# Patient Record
Sex: FEMALE | Race: WHITE | NOT HISPANIC OR LATINO | Employment: OTHER | URBAN - METROPOLITAN AREA
[De-identification: names, ages, dates, MRNs, and addresses within clinical notes are randomized per-mention and may not be internally consistent; named-entity substitution may affect disease eponyms.]

---

## 2019-01-01 ENCOUNTER — APPOINTMENT (OUTPATIENT)
Dept: GENERAL RADIOLOGY | Facility: HOSPITAL | Age: 59
End: 2019-01-01

## 2019-01-01 ENCOUNTER — HOSPITAL ENCOUNTER (EMERGENCY)
Facility: HOSPITAL | Age: 59
Discharge: HOME | End: 2019-09-08
Attending: EMERGENCY MEDICINE | Admitting: EMERGENCY MEDICINE

## 2019-01-01 VITALS
HEART RATE: 88 BPM | DIASTOLIC BLOOD PRESSURE: 103 MMHG | SYSTOLIC BLOOD PRESSURE: 171 MMHG | RESPIRATION RATE: 17 BRPM | BODY MASS INDEX: 31.28 KG/M2 | HEIGHT: 62 IN | TEMPERATURE: 98.2 F | WEIGHT: 170 LBS | OXYGEN SATURATION: 99 %

## 2019-01-01 DIAGNOSIS — J44.1 COPD EXACERBATION (HCC): Primary | ICD-10-CM

## 2019-01-01 PROCEDURE — 99284 EMERGENCY DEPT VISIT MOD MDM: CPT

## 2019-01-01 PROCEDURE — 63710000001 PREDNISONE PER 1 MG: Performed by: EMERGENCY MEDICINE

## 2019-01-01 PROCEDURE — 93010 ELECTROCARDIOGRAM REPORT: CPT | Performed by: INTERNAL MEDICINE

## 2019-01-01 PROCEDURE — 93005 ELECTROCARDIOGRAM TRACING: CPT | Performed by: INTERNAL MEDICINE

## 2019-01-01 PROCEDURE — 71046 X-RAY EXAM CHEST 2 VIEWS: CPT

## 2019-01-01 PROCEDURE — 93005 ELECTROCARDIOGRAM TRACING: CPT | Performed by: EMERGENCY MEDICINE

## 2019-01-01 PROCEDURE — 94640 AIRWAY INHALATION TREATMENT: CPT

## 2019-01-01 PROCEDURE — 94799 UNLISTED PULMONARY SVC/PX: CPT

## 2019-01-01 RX ORDER — HYDROCODONE BITARTRATE AND ACETAMINOPHEN 7.5; 325 MG/1; MG/1
1 TABLET ORAL ONCE
Status: COMPLETED | OUTPATIENT
Start: 2019-01-01 | End: 2019-01-01

## 2019-01-01 RX ORDER — PREDNISONE 10 MG/1
50 TABLET ORAL DAILY
Qty: 20 TABLET | Refills: 0 | Status: SHIPPED | OUTPATIENT
Start: 2019-01-01 | End: 2019-01-01 | Stop reason: SDUPTHER

## 2019-01-01 RX ORDER — PREDNISONE 10 MG/1
50 TABLET ORAL DAILY
Qty: 20 TABLET | Refills: 0 | Status: SHIPPED | OUTPATIENT
Start: 2019-01-01 | End: 2019-01-01

## 2019-01-01 RX ORDER — DEXTROMETHORPHAN HYDROBROMIDE AND PROMETHAZINE HYDROCHLORIDE 15; 6.25 MG/5ML; MG/5ML
5 SYRUP ORAL 4 TIMES DAILY PRN
Qty: 118 ML | Refills: 0 | Status: SHIPPED | OUTPATIENT
Start: 2019-01-01 | End: 2019-01-01 | Stop reason: SDUPTHER

## 2019-01-01 RX ORDER — PREDNISONE 20 MG/1
60 TABLET ORAL ONCE
Status: COMPLETED | OUTPATIENT
Start: 2019-01-01 | End: 2019-01-01

## 2019-01-01 RX ORDER — DEXTROMETHORPHAN HYDROBROMIDE AND PROMETHAZINE HYDROCHLORIDE 15; 6.25 MG/5ML; MG/5ML
5 SYRUP ORAL 4 TIMES DAILY PRN
Qty: 118 ML | Refills: 0 | Status: SHIPPED | OUTPATIENT
Start: 2019-01-01 | End: 2020-01-01

## 2019-01-01 RX ORDER — IPRATROPIUM BROMIDE AND ALBUTEROL SULFATE 2.5; .5 MG/3ML; MG/3ML
3 SOLUTION RESPIRATORY (INHALATION) ONCE
Status: COMPLETED | OUTPATIENT
Start: 2019-01-01 | End: 2019-01-01

## 2019-01-01 RX ADMIN — PREDNISONE 60 MG: 20 TABLET ORAL at 14:38

## 2019-01-01 RX ADMIN — HYDROCODONE BITARTRATE AND ACETAMINOPHEN 1 TABLET: 7.5; 325 TABLET ORAL at 14:38

## 2019-01-01 RX ADMIN — IPRATROPIUM BROMIDE AND ALBUTEROL SULFATE 3 ML: 2.5; .5 SOLUTION RESPIRATORY (INHALATION) at 14:30

## 2019-07-10 ENCOUNTER — APPOINTMENT (OUTPATIENT)
Dept: GENERAL RADIOLOGY | Facility: HOSPITAL | Age: 59
End: 2019-07-10

## 2019-07-10 ENCOUNTER — HOSPITAL ENCOUNTER (EMERGENCY)
Facility: HOSPITAL | Age: 59
Discharge: HOME | End: 2019-07-10
Attending: EMERGENCY MEDICINE | Admitting: EMERGENCY MEDICINE

## 2019-07-10 VITALS
HEIGHT: 62 IN | DIASTOLIC BLOOD PRESSURE: 86 MMHG | BODY MASS INDEX: 30.18 KG/M2 | RESPIRATION RATE: 20 BRPM | OXYGEN SATURATION: 95 % | HEART RATE: 84 BPM | WEIGHT: 164 LBS | SYSTOLIC BLOOD PRESSURE: 136 MMHG | TEMPERATURE: 98 F

## 2019-07-10 DIAGNOSIS — R06.00 DYSPNEA, UNSPECIFIED TYPE: ICD-10-CM

## 2019-07-10 DIAGNOSIS — J44.1 COPD EXACERBATION (HCC): Primary | ICD-10-CM

## 2019-07-10 DIAGNOSIS — R07.81 PLEURITIC CHEST PAIN: ICD-10-CM

## 2019-07-10 LAB
ANION GAP SERPL CALCULATED.3IONS-SCNC: 16.9 MMOL/L (ref 5–15)
BASOPHILS # BLD AUTO: 0.1 10*3/MM3 (ref 0–0.2)
BASOPHILS NFR BLD AUTO: 0.3 % (ref 0–1.5)
BUN BLD-MCNC: 24 MG/DL (ref 8–20)
BUN/CREAT SERPL: 17.1 (ref 5.4–26.2)
CALCIUM SPEC-SCNC: 9.4 MG/DL (ref 8.9–10.3)
CHLORIDE SERPL-SCNC: 98 MMOL/L (ref 101–111)
CO2 SERPL-SCNC: 27 MMOL/L (ref 22–32)
CREAT BLD-MCNC: 1.4 MG/DL (ref 0.4–1)
DEPRECATED RDW RBC AUTO: 49.4 FL (ref 37–54)
EOSINOPHIL # BLD AUTO: 0 10*3/MM3 (ref 0–0.4)
EOSINOPHIL NFR BLD AUTO: 0 % (ref 0.3–6.2)
ERYTHROCYTE [DISTWIDTH] IN BLOOD BY AUTOMATED COUNT: 14.6 % (ref 12.3–15.4)
GFR SERPL CREATININE-BSD FRML MDRD: 39 ML/MIN/1.73
GLUCOSE BLD-MCNC: 117 MG/DL (ref 65–99)
HCT VFR BLD AUTO: 40.1 % (ref 34–46.6)
HGB BLD-MCNC: 13.4 G/DL (ref 12–15.9)
HOLD SPECIMEN: NORMAL
HOLD SPECIMEN: NORMAL
LYMPHOCYTES # BLD AUTO: 0.8 10*3/MM3 (ref 0.7–3.1)
LYMPHOCYTES NFR BLD AUTO: 4.8 % (ref 19.6–45.3)
MCH RBC QN AUTO: 32.3 PG (ref 26.6–33)
MCHC RBC AUTO-ENTMCNC: 33.3 G/DL (ref 31.5–35.7)
MCV RBC AUTO: 96.9 FL (ref 79–97)
MONOCYTES # BLD AUTO: 1.2 10*3/MM3 (ref 0.1–0.9)
MONOCYTES NFR BLD AUTO: 7.5 % (ref 5–12)
NEUTROPHILS # BLD AUTO: 14.4 10*3/MM3 (ref 1.7–7)
NEUTROPHILS NFR BLD AUTO: 87.4 % (ref 42.7–76)
NRBC BLD AUTO-RTO: 0 /100 WBC (ref 0–0.2)
PLATELET # BLD AUTO: 330 10*3/MM3 (ref 140–450)
PMV BLD AUTO: 7 FL (ref 6–12)
POTASSIUM BLD-SCNC: 3.9 MMOL/L (ref 3.6–5.1)
RBC # BLD AUTO: 4.14 10*6/MM3 (ref 3.77–5.28)
SODIUM BLD-SCNC: 138 MMOL/L (ref 136–144)
WBC NRBC COR # BLD: 16.5 10*3/MM3 (ref 3.4–10.8)
WHOLE BLOOD HOLD SPECIMEN: NORMAL
WHOLE BLOOD HOLD SPECIMEN: NORMAL

## 2019-07-10 PROCEDURE — 94640 AIRWAY INHALATION TREATMENT: CPT

## 2019-07-10 PROCEDURE — 94799 UNLISTED PULMONARY SVC/PX: CPT

## 2019-07-10 PROCEDURE — 85025 COMPLETE CBC W/AUTO DIFF WBC: CPT | Performed by: EMERGENCY MEDICINE

## 2019-07-10 PROCEDURE — 80048 BASIC METABOLIC PNL TOTAL CA: CPT | Performed by: EMERGENCY MEDICINE

## 2019-07-10 PROCEDURE — 25010000002 METHYLPREDNISOLONE PER 125 MG: Performed by: EMERGENCY MEDICINE

## 2019-07-10 PROCEDURE — 96374 THER/PROPH/DIAG INJ IV PUSH: CPT

## 2019-07-10 PROCEDURE — 93005 ELECTROCARDIOGRAM TRACING: CPT | Performed by: EMERGENCY MEDICINE

## 2019-07-10 PROCEDURE — 71045 X-RAY EXAM CHEST 1 VIEW: CPT

## 2019-07-10 PROCEDURE — 94760 N-INVAS EAR/PLS OXIMETRY 1: CPT

## 2019-07-10 PROCEDURE — 99284 EMERGENCY DEPT VISIT MOD MDM: CPT

## 2019-07-10 RX ORDER — ACETAMINOPHEN 500 MG
1000 TABLET ORAL ONCE
Status: COMPLETED | OUTPATIENT
Start: 2019-07-10 | End: 2019-07-10

## 2019-07-10 RX ORDER — SODIUM CHLORIDE 0.9 % (FLUSH) 0.9 %
10 SYRINGE (ML) INJECTION AS NEEDED
Status: DISCONTINUED | OUTPATIENT
Start: 2019-07-10 | End: 2019-07-10 | Stop reason: HOSPADM

## 2019-07-10 RX ORDER — ALBUTEROL SULFATE 2.5 MG/3ML
2.5 SOLUTION RESPIRATORY (INHALATION)
Status: COMPLETED | OUTPATIENT
Start: 2019-07-10 | End: 2019-07-10

## 2019-07-10 RX ORDER — METHYLPREDNISOLONE SODIUM SUCCINATE 125 MG/2ML
125 INJECTION, POWDER, LYOPHILIZED, FOR SOLUTION INTRAMUSCULAR; INTRAVENOUS ONCE
Status: COMPLETED | OUTPATIENT
Start: 2019-07-10 | End: 2019-07-10

## 2019-07-10 RX ADMIN — ACETAMINOPHEN 1000 MG: 500 TABLET, FILM COATED ORAL at 05:29

## 2019-07-10 RX ADMIN — METHYLPREDNISOLONE SODIUM SUCCINATE 125 MG: 125 INJECTION, POWDER, FOR SOLUTION INTRAMUSCULAR; INTRAVENOUS at 02:42

## 2019-07-10 RX ADMIN — ALBUTEROL SULFATE 2.5 MG: 2.5 SOLUTION RESPIRATORY (INHALATION) at 03:15

## 2019-07-10 RX ADMIN — ALBUTEROL SULFATE 2.5 MG: 2.5 SOLUTION RESPIRATORY (INHALATION) at 03:05

## 2019-07-10 RX ADMIN — SODIUM CHLORIDE 500 ML: 0.9 INJECTION, SOLUTION INTRAVENOUS at 02:42

## 2019-07-10 NOTE — ED NOTES
"Patient very upset at this time. Shouting \"I can't believe that mother castro didn't help me with my pain! He is worthless and wants to just give me Tylenol.\" She became more agitated when it was explained that she came to the ED because of taking too much medication. She screamed \"It was 15 hours ago! This is why people self medicate! Can't you see all these bruises? I'm hurting!\" She began shaking the bed rails and flopping her legs on the bed. She continued to become more aggravated. Charge nurse made aware.      Marta Walker RN  07/10/19 0542    "

## 2019-07-10 NOTE — ED NOTES
Patient states she would rather sleep under a bridge than to go home.      Marta Walker, BILL  07/10/19 0573

## 2019-07-10 NOTE — ED PROVIDER NOTES
"Subjective   History of Present Illness  Left chest wall pain  58-year-old female complains of a productive cough over the last 3 days associated with a moderate left lateral sharp chest pain with cough.  She describes it as pleurisy.  She states she has felt mildly short of breath.  She reports no fevers or chills or abdominal pain or nausea or vomiting.  She reports no radiating pain or diaphoresis.  Review of Systems   Constitutional: Negative.    HENT: Negative.    Eyes: Negative.    Respiratory: Positive for cough and shortness of breath.    Cardiovascular: Positive for chest pain.   Gastrointestinal: Negative.    Endocrine: Negative.    Genitourinary: Negative.    Musculoskeletal: Negative.    Skin: Negative.    Allergic/Immunologic: Negative.    Neurological: Negative.    Hematological: Negative.    Psychiatric/Behavioral: Negative.        No past medical history on file.    No Known Allergies    No past surgical history on file.    No family history on file.    Social History     Socioeconomic History   • Marital status:      Spouse name: Not on file   • Number of children: Not on file   • Years of education: Not on file   • Highest education level: Not on file       /86   Pulse 84   Temp 98 °F (36.7 °C) (Oral)   Resp 20   Ht 157.5 cm (62\")   Wt 74.4 kg (164 lb)   SpO2 95%   BMI 30.00 kg/m²       Objective   Physical Exam  General: Well-developed well-appearing, no acute distress, alert and appropriate  Eyes: Pupils round and reactive, sclera nonicteric  HEENT: Mucous membranes moist, no mucosal swelling  Neck: Supple, no nuchal rigidity, no lymphadenopathy  Respirations: Respirations nonlabored, equal breath sounds bilaterally, some coarse wheezing bilaterally, chest wall tender palpation along the left lateral chest wall which does reproduce the presenting pain, there is no rash, there is no subcu air or crepitus  Heart regular rate and rhythm, no murmurs rubs or gallops,   Abdomen " soft nontender nondistended, no hepatosplenomegaly, no hernia, no mass, normal bowel sounds, no CVA tenderness  Extremities no clubbing cyanosis or edema, calves are symmetric and nontender  Neuro cranial nerves grossly intact, no focal limb deficits  Psych oriented, tearful   skin no rash, brisk cap refill  Procedures           ED Course      Results for orders placed or performed during the hospital encounter of 07/10/19   Basic Metabolic Panel   Result Value Ref Range    Glucose 117 (H) 65 - 99 mg/dL    BUN 24 (H) 8 - 20 mg/dL    Creatinine 1.40 (H) 0.40 - 1.00 mg/dL    Sodium 138 136 - 144 mmol/L    Potassium 3.9 3.6 - 5.1 mmol/L    Chloride 98 (L) 101 - 111 mmol/L    CO2 27.0 22.0 - 32.0 mmol/L    Calcium 9.4 8.9 - 10.3 mg/dL    eGFR Non African Amer 39 (L) >60 mL/min/1.73    BUN/Creatinine Ratio 17.1 5.4 - 26.2    Anion Gap 16.9 (H) 5.0 - 15.0 mmol/L   CBC Auto Differential   Result Value Ref Range    WBC 16.50 (H) 3.40 - 10.80 10*3/mm3    RBC 4.14 3.77 - 5.28 10*6/mm3    Hemoglobin 13.4 12.0 - 15.9 g/dL    Hematocrit 40.1 34.0 - 46.6 %    MCV 96.9 79.0 - 97.0 fL    MCH 32.3 26.6 - 33.0 pg    MCHC 33.3 31.5 - 35.7 g/dL    RDW 14.6 12.3 - 15.4 %    RDW-SD 49.4 37.0 - 54.0 fl    MPV 7.0 6.0 - 12.0 fL    Platelets 330 140 - 450 10*3/mm3    Neutrophil % 87.4 (H) 42.7 - 76.0 %    Lymphocyte % 4.8 (L) 19.6 - 45.3 %    Monocyte % 7.5 5.0 - 12.0 %    Eosinophil % 0.0 (L) 0.3 - 6.2 %    Basophil % 0.3 0.0 - 1.5 %    Neutrophils, Absolute 14.40 (H) 1.70 - 7.00 10*3/mm3    Lymphocytes, Absolute 0.80 0.70 - 3.10 10*3/mm3    Monocytes, Absolute 1.20 (H) 0.10 - 0.90 10*3/mm3    Eosinophils, Absolute 0.00 0.00 - 0.40 10*3/mm3    Basophils, Absolute 0.10 0.00 - 0.20 10*3/mm3    nRBC 0.0 0.0 - 0.2 /100 WBC   Light Blue Top   Result Value Ref Range    Extra Tube hold for add-on    Green Top (Gel)   Result Value Ref Range    Extra Tube Hold for add-ons.    Lavender Top   Result Value Ref Range    Extra Tube hold for add-on     Gold Top - SST   Result Value Ref Range    Extra Tube Hold for add-ons.      Chest x-ray basilar atelectasis on the right, no acute pulmonary process, pending radiology report          MDM    Patient reportedly has been taking prednisone and was advised to continue.  Her oxygen saturation on reexamination are 97% on room air she is in no respiratory distress she has some mild expiratory wheeze.  Chest x-ray shows some mild atelectasis no acute infiltrate pending radiology report.  She is discharged in good condition she was prescribed doxycycline and was given warning signs for return.  Notably her pain is reproducible with palpation and thus not felt to be likely related to cardiac ischemia or pulmonary embolus and is more consistent with musculoskeletal origin  Final diagnoses:   COPD exacerbation (CMS/[facility])   Dyspnea, unspecified type   Pleuritic chest pain            Gaurang Patrick MD  07/10/19 7449       Gaurang Patrick MD  07/10/19 2951       Gaurang Patrick MD  07/10/19 1872
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
## 2019-07-10 NOTE — ED NOTES
[facility] leaving facility. Contact number [phone] Jill Varela at [phone].      Marta Walker RN  07/10/19 1506
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
## 2019-07-19 ENCOUNTER — APPOINTMENT (OUTPATIENT)
Dept: GENERAL RADIOLOGY | Facility: HOSPITAL | Age: 59
End: 2019-07-19

## 2019-07-19 ENCOUNTER — HOSPITAL ENCOUNTER (EMERGENCY)
Facility: HOSPITAL | Age: 59
Discharge: HOME | End: 2019-07-19
Admitting: EMERGENCY MEDICINE

## 2019-07-19 VITALS
DIASTOLIC BLOOD PRESSURE: 92 MMHG | RESPIRATION RATE: 15 BRPM | SYSTOLIC BLOOD PRESSURE: 176 MMHG | OXYGEN SATURATION: 100 % | TEMPERATURE: 97.8 F | WEIGHT: 161.6 LBS | HEART RATE: 91 BPM | BODY MASS INDEX: 29.74 KG/M2 | HEIGHT: 62 IN

## 2019-07-19 DIAGNOSIS — R05.9 COUGH: ICD-10-CM

## 2019-07-19 DIAGNOSIS — M79.601 RIGHT ARM PAIN: Primary | ICD-10-CM

## 2019-07-19 PROCEDURE — 99283 EMERGENCY DEPT VISIT LOW MDM: CPT

## 2019-07-19 PROCEDURE — 71101 X-RAY EXAM UNILAT RIBS/CHEST: CPT

## 2019-07-19 PROCEDURE — 94640 AIRWAY INHALATION TREATMENT: CPT

## 2019-07-19 PROCEDURE — 25010000002 KETOROLAC TROMETHAMINE PER 15 MG: Performed by: NURSE PRACTITIONER

## 2019-07-19 PROCEDURE — 96372 THER/PROPH/DIAG INJ SC/IM: CPT

## 2019-07-19 RX ORDER — LORATADINE 10 MG/1
10 TABLET ORAL DAILY
COMMUNITY
End: 2020-01-01 | Stop reason: HOSPADM

## 2019-07-19 RX ORDER — IPRATROPIUM BROMIDE AND ALBUTEROL SULFATE 2.5; .5 MG/3ML; MG/3ML
3 SOLUTION RESPIRATORY (INHALATION) EVERY 4 HOURS PRN
Qty: 90 ML | Refills: 0 | Status: SHIPPED | OUTPATIENT
Start: 2019-07-19 | End: 2020-01-01

## 2019-07-19 RX ORDER — PREDNISONE 10 MG/1
10 TABLET ORAL DAILY
Qty: 5 TABLET | Refills: 0 | OUTPATIENT
Start: 2019-07-19 | End: 2019-01-01

## 2019-07-19 RX ORDER — PROMETHAZINE HYDROCHLORIDE 25 MG/1
25 TABLET ORAL EVERY 8 HOURS PRN
COMMUNITY
End: 2020-01-01

## 2019-07-19 RX ORDER — RANITIDINE 150 MG/1
150 TABLET ORAL 2 TIMES DAILY
COMMUNITY
End: 2020-01-01

## 2019-07-19 RX ORDER — IPRATROPIUM BROMIDE AND ALBUTEROL SULFATE 2.5; .5 MG/3ML; MG/3ML
3 SOLUTION RESPIRATORY (INHALATION) ONCE
Status: COMPLETED | OUTPATIENT
Start: 2019-07-19 | End: 2019-07-19

## 2019-07-19 RX ORDER — ROFLUMILAST 500 UG/1
125 TABLET ORAL DAILY
COMMUNITY
End: 2020-01-01

## 2019-07-19 RX ORDER — DOXYCYCLINE HYCLATE 100 MG/1
100 CAPSULE ORAL 2 TIMES DAILY
COMMUNITY
Start: 2019-07-12 | End: 2020-01-01

## 2019-07-19 RX ORDER — MONTELUKAST SODIUM 10 MG/1
10 TABLET ORAL NIGHTLY
COMMUNITY
End: 2020-01-01

## 2019-07-19 RX ORDER — KETOROLAC TROMETHAMINE 30 MG/ML
30 INJECTION, SOLUTION INTRAMUSCULAR; INTRAVENOUS ONCE
Status: COMPLETED | OUTPATIENT
Start: 2019-07-19 | End: 2019-07-19

## 2019-07-19 RX ORDER — AMLODIPINE BESYLATE 5 MG/1
5 TABLET ORAL DAILY
COMMUNITY
End: 2020-01-01 | Stop reason: HOSPADM

## 2019-07-19 RX ORDER — PANTOPRAZOLE SODIUM 40 MG/1
40 TABLET, DELAYED RELEASE ORAL DAILY PRN
COMMUNITY
End: 2020-01-01

## 2019-07-19 RX ORDER — ALBUTEROL SULFATE 90 UG/1
2 AEROSOL, METERED RESPIRATORY (INHALATION) EVERY 4 HOURS PRN
Qty: 6.7 G | Refills: 0 | Status: SHIPPED | OUTPATIENT
Start: 2019-07-19 | End: 2020-01-01

## 2019-07-19 RX ORDER — ALBUTEROL SULFATE 90 UG/1
2 AEROSOL, METERED RESPIRATORY (INHALATION) EVERY 6 HOURS PRN
COMMUNITY
End: 2020-01-01

## 2019-07-19 RX ORDER — FLUTICASONE PROPIONATE 50 MCG
1 SPRAY, SUSPENSION (ML) NASAL 2 TIMES DAILY
COMMUNITY
End: 2020-01-01

## 2019-07-19 RX ORDER — BUPRENORPHINE HYDROCHLORIDE AND NALOXONE HYDROCHLORIDE DIHYDRATE 8; 2 MG/1; MG/1
2 TABLET SUBLINGUAL DAILY
COMMUNITY
End: 2020-01-01

## 2019-07-19 RX ADMIN — IPRATROPIUM BROMIDE AND ALBUTEROL SULFATE 3 ML: .5; 3 SOLUTION RESPIRATORY (INHALATION) at 10:27

## 2019-07-19 RX ADMIN — KETOROLAC TROMETHAMINE 30 MG: 30 INJECTION, SOLUTION INTRAMUSCULAR at 11:47

## 2019-09-08 NOTE — ED PROVIDER NOTES
" EMERGENCY DEPARTMENT ENCOUNTER    Room Number:  09/09  Date seen:  9/8/2019  Time seen: 1:26 PM  PCP: Provider, No Known  Historian: Patient      HPI:  Chief Complaint: Cough, COPD flare  A complete HPI/ROS/PMH/PSH/SH/FH are unobtainable due to: Nothing  Context: Daphne Mccurdy is a 58 y.o. female who presents to the ED c/o cough, shortness of breath for the last 4 days.  She reports that she went to urgent care yesterday and was prescribed doxycycline.  She reports she has been taking low-dose steroids for the last 2 years.  She apparently has a pulmonologist whom she is supposed to be getting established with later this month.  She also reports pain over her ribs on the left when coughing.  She has had no fever or chills.  She has had increased sputum production.  She reports a history of \"stage IV COPD\".            PAST MEDICAL HISTORY  Active Ambulatory Problems     Diagnosis Date Noted   • No Active Ambulatory Problems     Resolved Ambulatory Problems     Diagnosis Date Noted   • No Resolved Ambulatory Problems     Past Medical History:   Diagnosis Date   • Asthma    • Cellulitis    • COPD (chronic obstructive pulmonary disease) (CMS/HCC)    • GERD (gastroesophageal reflux disease)    • Hepatitis C    • Hyperlipidemia    • Hypertension    • Pneumonia          PAST SURGICAL HISTORY  Past Surgical History:   Procedure Laterality Date   • MANDIBLE SURGERY     • TUBAL ABDOMINAL LIGATION           FAMILY HISTORY  History reviewed. No pertinent family history.      SOCIAL HISTORY  Social History     Socioeconomic History   • Marital status:      Spouse name: Not on file   • Number of children: Not on file   • Years of education: Not on file   • Highest education level: Not on file   Tobacco Use   • Smoking status: Current Every Day Smoker   Substance and Sexual Activity   • Alcohol use: No     Frequency: Never   • Drug use: Yes     Types: Marijuana         ALLERGIES  Patient has no known " allergies.        REVIEW OF SYSTEMS  Review of Systems   Constitutional: Negative for diaphoresis and fever.   HENT: Negative for congestion.    Eyes: Negative for visual disturbance.   Respiratory: Positive for cough and shortness of breath.    Cardiovascular: Negative for palpitations.   Gastrointestinal: Negative for blood in stool and vomiting.   Endocrine: Negative for polyuria.   Genitourinary: Negative for flank pain.   Musculoskeletal: Negative for joint swelling.   Skin: Negative for wound.   Neurological: Negative for seizures.   Hematological: Negative for adenopathy.   Psychiatric/Behavioral: Negative for sleep disturbance.            PHYSICAL EXAM  ED Triage Vitals   Temp Heart Rate Resp BP SpO2   09/08/19 1312 09/08/19 1311 09/08/19 1311 09/08/19 1311 09/08/19 1311   98.2 °F (36.8 °C) 103 18 170/100 98 %      Temp src Heart Rate Source Patient Position BP Location FiO2 (%)   09/08/19 1312 09/08/19 1311 -- -- --   Tympanic Monitor            GENERAL: Awake and alert, no acute distress  HENT: nares patent  EYES: no scleral icterus  CV: regular rhythm, normal rate  RESPIRATORY: normal effort, mild diffuse expiratory wheezing.  Point tenderness of the left lateral chest wall in the midaxillary line.  ABDOMEN: soft, nontender throughout  MUSCULOSKELETAL: no deformity, no lower extremity edema  NEURO: alert, moves all extremities, follows commands  SKIN: warm, dry    Vital signs and nursing notes reviewed.          58-year-old female presents with cough for the last 4 days.  She is afebrile here.      RADIOLOGY  Xr Chest 2 View    Result Date: 9/8/2019  Chest radiograph  HISTORY:Cough  TECHNIQUE: Two PA and lateral radiographs  COMPARISON:None  FINDINGS: Pulmonary opacification within the left base is present and a primarily linear distribution. There is no pulmonary consolidation.. There is no pleural effusion. No pneumothorax is seen. The heart is at the upper limits of normal in size. Compression  deformities within the mid to upper thoracic spine are incompletely evaluated.      1.  Primarily linear pulmonary opacification within the left base favored to represent atelectasis. Early pneumonia could appear similar in the appropriate clinical context. However, this felt to be less likely. Correlation with patient history is recommended. 2.  Compression deformities within the mid to upper thoracic spine are incompletely evaluated and of indeterminate age. Findings can be further evaluated with MRI if clinically indicated.  This report was finalized on 9/8/2019 2:11 PM by Dr. Tapan Dumont M.D.        Ordered the above noted radiological studies. Reviewed by me in PACS.            PROCEDURES  Procedures        EKG:           EKG time: 1416  Rhythm/Rate: Sinus rhythm, 87    P waves and NJ: normal  QRS, axis: Normal  ST and T waves: No acute ischemic changes    Interpreted Contemporaneously by me, independently viewed  Similar compared to prior 7/10/2019              MEDICATIONS GIVEN IN ER  Medications   predniSONE (DELTASONE) tablet 60 mg (60 mg Oral Given 9/8/19 1438)   ipratropium-albuterol (DUO-NEB) nebulizer solution 3 mL (3 mL Nebulization Given 9/8/19 1430)   HYDROcodone-acetaminophen (NORCO) 7.5-325 MG per tablet 1 tablet (1 tablet Oral Given 9/8/19 1438)                   PROGRESS AND CONSULTS       1500:  Patient reassessed, improved lung sounds after neb treatment.  Steroids and cough syrup sent to her pharmacy.  Return precautions discussed.     MEDICAL DECISION MAKING    Chest x-ray with left lung base linear atelectasis versus developing infiltrate.  She is already taking doxycycline which was prescribed to her yesterday.  I recommended treating her with increased dose of prednisone, and continue doxycycline prescribed.  She was given a DuoNeb treatment here.  EKG obtained and reassuring.  Return precautions were discussed.  Follow-up with pulmonary.    MDM           DIAGNOSIS  Final diagnoses:    COPD exacerbation (CMS/[facility])         DISPOSITION  Discharge            Latest Documented Vital Signs:  As of 3:08 PM  BP- (!) 171/103 HR- 92 Temp- 98.2 °F (36.8 °C) (Tympanic) O2 sat- 100%        --  Documentation assistance provided by arely Lobo for Dr. TIO Guardado MD.  Information recorded by the scribe was done at my direction and has been verified and validated by me.      Please note that portions of this were completed with a voice recognition program.      Nichole Lobo  09/08/19 6851       Manuelito Guardado MD  09/08/19 2225

## 2020-01-01 ENCOUNTER — APPOINTMENT (OUTPATIENT)
Dept: GENERAL RADIOLOGY | Facility: HOSPITAL | Age: 60
End: 2020-01-01

## 2020-01-01 ENCOUNTER — HOSPITAL ENCOUNTER (INPATIENT)
Facility: HOSPITAL | Age: 60
LOS: 2 days | Discharge: HOSPICE | End: 2020-07-17
Attending: FAMILY MEDICINE | Admitting: FAMILY MEDICINE

## 2020-01-01 ENCOUNTER — HOSPITAL ENCOUNTER (INPATIENT)
Facility: HOSPITAL | Age: 60
LOS: 6 days | End: 2020-09-03
Attending: INTERNAL MEDICINE | Admitting: INTERNAL MEDICINE

## 2020-01-01 ENCOUNTER — HOSPITAL ENCOUNTER (INPATIENT)
Facility: HOSPITAL | Age: 60
LOS: 4 days | Discharge: HOSPICE | End: 2020-08-04
Attending: EMERGENCY MEDICINE | Admitting: HOSPITALIST

## 2020-01-01 ENCOUNTER — READMISSION MANAGEMENT (OUTPATIENT)
Dept: CALL CENTER | Facility: HOSPITAL | Age: 60
End: 2020-01-01

## 2020-01-01 ENCOUNTER — HOSPITAL ENCOUNTER (INPATIENT)
Facility: HOSPITAL | Age: 60
LOS: 5 days | Discharge: HOSPICE | End: 2020-08-28
Attending: EMERGENCY MEDICINE | Admitting: INTERNAL MEDICINE

## 2020-01-01 ENCOUNTER — APPOINTMENT (OUTPATIENT)
Dept: CT IMAGING | Facility: HOSPITAL | Age: 60
End: 2020-01-01

## 2020-01-01 ENCOUNTER — APPOINTMENT (OUTPATIENT)
Dept: CARDIOLOGY | Facility: HOSPITAL | Age: 60
End: 2020-01-01

## 2020-01-01 VITALS
HEIGHT: 62 IN | TEMPERATURE: 101 F | SYSTOLIC BLOOD PRESSURE: 88 MMHG | BODY MASS INDEX: 19.85 KG/M2 | OXYGEN SATURATION: 45 % | DIASTOLIC BLOOD PRESSURE: 47 MMHG | WEIGHT: 107.9 LBS

## 2020-01-01 VITALS
HEART RATE: 118 BPM | WEIGHT: 96.78 LBS | BODY MASS INDEX: 19 KG/M2 | SYSTOLIC BLOOD PRESSURE: 114 MMHG | HEIGHT: 60 IN | OXYGEN SATURATION: 94 % | TEMPERATURE: 98.9 F | DIASTOLIC BLOOD PRESSURE: 79 MMHG | RESPIRATION RATE: 22 BRPM

## 2020-01-01 VITALS
WEIGHT: 117.5 LBS | RESPIRATION RATE: 20 BRPM | HEART RATE: 92 BPM | BODY MASS INDEX: 21.62 KG/M2 | TEMPERATURE: 97.9 F | DIASTOLIC BLOOD PRESSURE: 89 MMHG | SYSTOLIC BLOOD PRESSURE: 146 MMHG | OXYGEN SATURATION: 96 % | HEIGHT: 62 IN

## 2020-01-01 VITALS
HEIGHT: 62 IN | TEMPERATURE: 98 F | WEIGHT: 117.73 LBS | RESPIRATION RATE: 18 BRPM | DIASTOLIC BLOOD PRESSURE: 87 MMHG | HEART RATE: 110 BPM | SYSTOLIC BLOOD PRESSURE: 120 MMHG | OXYGEN SATURATION: 97 % | BODY MASS INDEX: 21.66 KG/M2

## 2020-01-01 DIAGNOSIS — R07.81 PLEURITIC CHEST PAIN: ICD-10-CM

## 2020-01-01 DIAGNOSIS — R65.20 SEVERE SEPSIS (HCC): Primary | ICD-10-CM

## 2020-01-01 DIAGNOSIS — E87.6 HYPOKALEMIA: ICD-10-CM

## 2020-01-01 DIAGNOSIS — R07.9 CHEST PAIN, UNSPECIFIED TYPE: ICD-10-CM

## 2020-01-01 DIAGNOSIS — J18.9 PNEUMONIA OF RIGHT LOWER LOBE DUE TO INFECTIOUS ORGANISM: ICD-10-CM

## 2020-01-01 DIAGNOSIS — J44.1 ACUTE EXACERBATION OF CHRONIC OBSTRUCTIVE PULMONARY DISEASE (COPD) (HCC): Primary | ICD-10-CM

## 2020-01-01 DIAGNOSIS — F06.4 ANXIETY DISORDER DUE TO KNOWN PHYSIOLOGICAL CONDITION: Chronic | ICD-10-CM

## 2020-01-01 DIAGNOSIS — R06.03 ACUTE RESPIRATORY DISTRESS: Primary | ICD-10-CM

## 2020-01-01 DIAGNOSIS — J43.9 PULMONARY EMPHYSEMA, UNSPECIFIED EMPHYSEMA TYPE (HCC): ICD-10-CM

## 2020-01-01 DIAGNOSIS — J20.9 ACUTE BRONCHITIS, UNSPECIFIED ORGANISM: ICD-10-CM

## 2020-01-01 DIAGNOSIS — J44.1 CHRONIC OBSTRUCTIVE PULMONARY DISEASE WITH ACUTE EXACERBATION (HCC): ICD-10-CM

## 2020-01-01 DIAGNOSIS — J44.1 COPD EXACERBATION (HCC): ICD-10-CM

## 2020-01-01 DIAGNOSIS — A41.9 SEVERE SEPSIS (HCC): Primary | ICD-10-CM

## 2020-01-01 DIAGNOSIS — R77.8 ELEVATED TROPONIN I LEVEL: ICD-10-CM

## 2020-01-01 LAB
ABO GROUP BLD: NORMAL
ALBUMIN SERPL-MCNC: 4 G/DL (ref 3.5–5.2)
ALBUMIN SERPL-MCNC: 4.1 G/DL (ref 3.5–5.2)
ALBUMIN SERPL-MCNC: 4.1 G/DL (ref 3.5–5.2)
ALBUMIN SERPL-MCNC: 4.3 G/DL (ref 3.5–5.2)
ALBUMIN/GLOB SERPL: 1.5 G/DL
ALBUMIN/GLOB SERPL: 1.7 G/DL
ALBUMIN/GLOB SERPL: 1.7 G/DL
ALBUMIN/GLOB SERPL: 1.9 G/DL
ALP SERPL-CCNC: 102 U/L (ref 39–117)
ALP SERPL-CCNC: 103 U/L (ref 39–117)
ALP SERPL-CCNC: 140 U/L (ref 39–117)
ALP SERPL-CCNC: 209 U/L (ref 39–117)
ALT SERPL W P-5'-P-CCNC: 27 U/L (ref 1–33)
ALT SERPL W P-5'-P-CCNC: 32 U/L (ref 1–33)
ALT SERPL W P-5'-P-CCNC: 32 U/L (ref 1–33)
ALT SERPL W P-5'-P-CCNC: 36 U/L (ref 1–33)
ANION GAP SERPL CALCULATED.3IONS-SCNC: 10 MMOL/L (ref 5–15)
ANION GAP SERPL CALCULATED.3IONS-SCNC: 11 MMOL/L (ref 5–15)
ANION GAP SERPL CALCULATED.3IONS-SCNC: 12 MMOL/L (ref 5–15)
ANION GAP SERPL CALCULATED.3IONS-SCNC: 14 MMOL/L (ref 5–15)
ANION GAP SERPL CALCULATED.3IONS-SCNC: 14 MMOL/L (ref 5–15)
ANION GAP SERPL CALCULATED.3IONS-SCNC: 16 MMOL/L (ref 5–15)
ANION GAP SERPL CALCULATED.3IONS-SCNC: 17 MMOL/L (ref 5–15)
ANION GAP SERPL CALCULATED.3IONS-SCNC: 17 MMOL/L (ref 5–15)
ANION GAP SERPL CALCULATED.3IONS-SCNC: 18 MMOL/L (ref 5–15)
ANISOCYTOSIS BLD QL: ABNORMAL
APTT PPP: 18.8 SECONDS (ref 24–31)
ARTERIAL PATENCY WRIST A: ABNORMAL
ARTERIAL PATENCY WRIST A: POSITIVE
AST SERPL-CCNC: 20 U/L (ref 1–32)
AST SERPL-CCNC: 23 U/L (ref 1–32)
AST SERPL-CCNC: 23 U/L (ref 1–32)
AST SERPL-CCNC: 27 U/L (ref 1–32)
ATMOSPHERIC PRESS: ABNORMAL MM[HG]
ATMOSPHERIC PRESS: ABNORMAL MM[HG]
B PARAPERT DNA SPEC QL NAA+PROBE: NOT DETECTED
B PARAPERT DNA SPEC QL NAA+PROBE: NOT DETECTED
B PERT DNA SPEC QL NAA+PROBE: NOT DETECTED
BACTERIA SPEC AEROBE CULT: ABNORMAL
BACTERIA SPEC AEROBE CULT: NORMAL
BASE EXCESS BLDA CALC-SCNC: 12 MMOL/L (ref 0–3)
BASE EXCESS BLDA CALC-SCNC: 20.1 MMOL/L (ref 0–3)
BASO STIPL COARSE BLD QL SMEAR: ABNORMAL
BASOPHILS # BLD AUTO: 0 10*3/MM3 (ref 0–0.2)
BASOPHILS # BLD AUTO: 0 10*3/MM3 (ref 0–0.2)
BASOPHILS # BLD AUTO: 0.1 10*3/MM3 (ref 0–0.2)
BASOPHILS NFR BLD AUTO: 0 % (ref 0–1.5)
BASOPHILS NFR BLD AUTO: 0.2 % (ref 0–1.5)
BASOPHILS NFR BLD AUTO: 0.7 % (ref 0–1.5)
BDY SITE: ABNORMAL
BDY SITE: ABNORMAL
BH BB BLOOD EXPIRATION DATE: NORMAL
BH BB BLOOD TYPE BARCODE: 5100
BH BB DISPENSE STATUS: NORMAL
BH BB PRODUCT CODE: NORMAL
BH BB UNIT NUMBER: NORMAL
BH CV ECHO MEAS - % IVS THICK: 38.6 %
BH CV ECHO MEAS - % LVPW THICK: 18 %
BH CV ECHO MEAS - ACS: 1.9 CM
BH CV ECHO MEAS - AO MAX PG (FULL): 1 MMHG
BH CV ECHO MEAS - AO MAX PG: 5.1 MMHG
BH CV ECHO MEAS - AO MEAN PG (FULL): 0.57 MMHG
BH CV ECHO MEAS - AO MEAN PG: 2.6 MMHG
BH CV ECHO MEAS - AO ROOT AREA (BSA CORRECTED): 1.9
BH CV ECHO MEAS - AO ROOT AREA: 6.4 CM^2
BH CV ECHO MEAS - AO ROOT DIAM: 2.9 CM
BH CV ECHO MEAS - AO V2 MAX: 113.2 CM/SEC
BH CV ECHO MEAS - AO V2 MEAN: 76.3 CM/SEC
BH CV ECHO MEAS - AO V2 VTI: 12 CM
BH CV ECHO MEAS - AORTIC HR: 396.6 BPM
BH CV ECHO MEAS - AORTIC R-R: 0.15 SEC
BH CV ECHO MEAS - AVA(I,A): 2.4 CM^2
BH CV ECHO MEAS - AVA(I,D): 2.4 CM^2
BH CV ECHO MEAS - AVA(V,A): 2.2 CM^2
BH CV ECHO MEAS - AVA(V,D): 2.2 CM^2
BH CV ECHO MEAS - BSA(HAYCOCK): 1.5 M^2
BH CV ECHO MEAS - BSA: 1.5 M^2
BH CV ECHO MEAS - BZI_BMI: 20.3 KILOGRAMS/M^2
BH CV ECHO MEAS - BZI_METRIC_HEIGHT: 157.5 CM
BH CV ECHO MEAS - BZI_METRIC_WEIGHT: 50.3 KG
BH CV ECHO MEAS - CI(AO): 20.4 L/MIN/M^2
BH CV ECHO MEAS - CI(LVOT): 7.6 L/MIN/M^2
BH CV ECHO MEAS - CO(AO): 30.4 L/MIN
BH CV ECHO MEAS - CO(LVOT): 11.3 L/MIN
BH CV ECHO MEAS - EDV(CUBED): 45.2 ML
BH CV ECHO MEAS - EDV(MOD-SP4): 45.8 ML
BH CV ECHO MEAS - EDV(TEICH): 53.1 ML
BH CV ECHO MEAS - EF(CUBED): 84.5 %
BH CV ECHO MEAS - EF(MOD-BP): 60 %
BH CV ECHO MEAS - EF(MOD-SP4): 59.8 %
BH CV ECHO MEAS - EF(TEICH): 78.6 %
BH CV ECHO MEAS - ESV(CUBED): 7 ML
BH CV ECHO MEAS - ESV(MOD-SP4): 18.4 ML
BH CV ECHO MEAS - ESV(TEICH): 11.4 ML
BH CV ECHO MEAS - FS: 46.3 %
BH CV ECHO MEAS - IVS/LVPW: 0.98
BH CV ECHO MEAS - IVSD: 1.1 CM
BH CV ECHO MEAS - IVSS: 1.5 CM
BH CV ECHO MEAS - LA DIMENSION(2D): 2.8 CM
BH CV ECHO MEAS - LV DIASTOLIC VOL/BSA (35-75): 30.7 ML/M^2
BH CV ECHO MEAS - LV MASS(C)D: 117.8 GRAMS
BH CV ECHO MEAS - LV MASS(C)DI: 79.2 GRAMS/M^2
BH CV ECHO MEAS - LV MASS(C)S: 79.2 GRAMS
BH CV ECHO MEAS - LV MASS(C)SI: 53.2 GRAMS/M^2
BH CV ECHO MEAS - LV MAX PG: 4.1 MMHG
BH CV ECHO MEAS - LV MEAN PG: 2 MMHG
BH CV ECHO MEAS - LV SYSTOLIC VOL/BSA (12-30): 12.3 ML/M^2
BH CV ECHO MEAS - LV V1 MAX: 101.2 CM/SEC
BH CV ECHO MEAS - LV V1 MEAN: 64.8 CM/SEC
BH CV ECHO MEAS - LV V1 VTI: 11.6 CM
BH CV ECHO MEAS - LVIDD: 3.6 CM
BH CV ECHO MEAS - LVIDS: 1.9 CM
BH CV ECHO MEAS - LVOT AREA: 2.4 CM^2
BH CV ECHO MEAS - LVOT DIAM: 1.8 CM
BH CV ECHO MEAS - LVPWD: 1.1 CM
BH CV ECHO MEAS - LVPWS: 1.3 CM
BH CV ECHO MEAS - MV A MAX VEL: 118.8 CM/SEC
BH CV ECHO MEAS - MV DEC SLOPE: 274.1 CM/SEC^2
BH CV ECHO MEAS - MV DEC TIME: 0.23 SEC
BH CV ECHO MEAS - MV E MAX VEL: 63.9 CM/SEC
BH CV ECHO MEAS - MV E/A: 0.54
BH CV ECHO MEAS - MV MAX PG: 6.4 MMHG
BH CV ECHO MEAS - MV MEAN PG: 2.5 MMHG
BH CV ECHO MEAS - MV V2 MAX: 126.5 CM/SEC
BH CV ECHO MEAS - MV V2 MEAN: 74.1 CM/SEC
BH CV ECHO MEAS - MV V2 VTI: 24 CM
BH CV ECHO MEAS - MVA(VTI): 1.2 CM^2
BH CV ECHO MEAS - PA ACC TIME: 0.04 SEC
BH CV ECHO MEAS - PA MAX PG (FULL): 0.31 MMHG
BH CV ECHO MEAS - PA MAX PG: 2.8 MMHG
BH CV ECHO MEAS - PA MEAN PG (FULL): -0.21 MMHG
BH CV ECHO MEAS - PA MEAN PG: 1.5 MMHG
BH CV ECHO MEAS - PA PR(ACCEL): 61.9 MMHG
BH CV ECHO MEAS - PA V2 MAX: 83.2 CM/SEC
BH CV ECHO MEAS - PA V2 MEAN: 56 CM/SEC
BH CV ECHO MEAS - PA V2 VTI: 11.5 CM
BH CV ECHO MEAS - RV MAX PG: 2.5 MMHG
BH CV ECHO MEAS - RV MEAN PG: 1.7 MMHG
BH CV ECHO MEAS - RV V1 MAX: 78.4 CM/SEC
BH CV ECHO MEAS - RV V1 MEAN: 62.3 CM/SEC
BH CV ECHO MEAS - RV V1 VTI: 10.4 CM
BH CV ECHO MEAS - RVDD: 2.3 CM
BH CV ECHO MEAS - SI(AO): 51.5 ML/M^2
BH CV ECHO MEAS - SI(CUBED): 25.7 ML/M^2
BH CV ECHO MEAS - SI(LVOT): 19.1 ML/M^2
BH CV ECHO MEAS - SI(MOD-SP4): 18.4 ML/M^2
BH CV ECHO MEAS - SI(TEICH): 28 ML/M^2
BH CV ECHO MEAS - SV(AO): 76.7 ML
BH CV ECHO MEAS - SV(CUBED): 38.2 ML
BH CV ECHO MEAS - SV(LVOT): 28.5 ML
BH CV ECHO MEAS - SV(MOD-SP4): 27.4 ML
BH CV ECHO MEAS - SV(TEICH): 41.7 ML
BILIRUB SERPL-MCNC: 0.4 MG/DL (ref 0–1.2)
BILIRUB SERPL-MCNC: 0.5 MG/DL (ref 0–1.2)
BILIRUB SERPL-MCNC: 0.8 MG/DL (ref 0–1.2)
BILIRUB SERPL-MCNC: 1 MG/DL (ref 0–1.2)
BILIRUB UR QL STRIP: NEGATIVE
BLD GP AB SCN SERPL QL: NEGATIVE
BUN SERPL-MCNC: 11 MG/DL (ref 6–20)
BUN SERPL-MCNC: 17 MG/DL (ref 6–20)
BUN SERPL-MCNC: 17 MG/DL (ref 6–20)
BUN SERPL-MCNC: 19 MG/DL (ref 6–20)
BUN SERPL-MCNC: 19 MG/DL (ref 6–20)
BUN SERPL-MCNC: 20 MG/DL (ref 6–20)
BUN SERPL-MCNC: 23 MG/DL (ref 6–20)
BUN SERPL-MCNC: 23 MG/DL (ref 6–20)
BUN SERPL-MCNC: 28 MG/DL (ref 6–20)
BUN SERPL-MCNC: 9 MG/DL (ref 6–20)
BUN SERPL-MCNC: ABNORMAL MG/DL
BUN/CREAT SERPL: ABNORMAL
BURR CELLS BLD QL SMEAR: ABNORMAL
C PNEUM DNA NPH QL NAA+NON-PROBE: NOT DETECTED
CALCIUM SPEC-SCNC: 8.1 MG/DL (ref 8.6–10.5)
CALCIUM SPEC-SCNC: 8.2 MG/DL (ref 8.6–10.5)
CALCIUM SPEC-SCNC: 8.4 MG/DL (ref 8.6–10.5)
CALCIUM SPEC-SCNC: 8.7 MG/DL (ref 8.6–10.5)
CALCIUM SPEC-SCNC: 9.2 MG/DL (ref 8.6–10.5)
CALCIUM SPEC-SCNC: 9.2 MG/DL (ref 8.6–10.5)
CALCIUM SPEC-SCNC: 9.3 MG/DL (ref 8.6–10.5)
CALCIUM SPEC-SCNC: 9.5 MG/DL (ref 8.6–10.5)
CALCIUM SPEC-SCNC: 9.7 MG/DL (ref 8.6–10.5)
CHLORIDE SERPL-SCNC: 104 MMOL/L (ref 98–107)
CHLORIDE SERPL-SCNC: 78 MMOL/L (ref 98–107)
CHLORIDE SERPL-SCNC: 81 MMOL/L (ref 98–107)
CHLORIDE SERPL-SCNC: 83 MMOL/L (ref 98–107)
CHLORIDE SERPL-SCNC: 85 MMOL/L (ref 98–107)
CHLORIDE SERPL-SCNC: 90 MMOL/L (ref 98–107)
CHLORIDE SERPL-SCNC: 91 MMOL/L (ref 98–107)
CHLORIDE SERPL-SCNC: 92 MMOL/L (ref 98–107)
CHLORIDE SERPL-SCNC: 93 MMOL/L (ref 98–107)
CHLORIDE SERPL-SCNC: 94 MMOL/L (ref 98–107)
CHLORIDE SERPL-SCNC: 97 MMOL/L (ref 98–107)
CLARITY UR: CLEAR
CO2 BLDA-SCNC: 38.2 MMOL/L (ref 22–29)
CO2 BLDA-SCNC: 47.1 MMOL/L (ref 22–29)
CO2 SERPL-SCNC: 20 MMOL/L (ref 22–29)
CO2 SERPL-SCNC: 23 MMOL/L (ref 22–29)
CO2 SERPL-SCNC: 28 MMOL/L (ref 22–29)
CO2 SERPL-SCNC: 29 MMOL/L (ref 22–29)
CO2 SERPL-SCNC: 32 MMOL/L (ref 22–29)
CO2 SERPL-SCNC: 32 MMOL/L (ref 22–29)
CO2 SERPL-SCNC: 34 MMOL/L (ref 22–29)
CO2 SERPL-SCNC: 36 MMOL/L (ref 22–29)
CO2 SERPL-SCNC: 38 MMOL/L (ref 22–29)
CO2 SERPL-SCNC: 42 MMOL/L (ref 22–29)
CO2 SERPL-SCNC: 43 MMOL/L (ref 22–29)
COLOR UR: YELLOW
CREAT SERPL-MCNC: 0.59 MG/DL (ref 0.57–1)
CREAT SERPL-MCNC: 0.75 MG/DL (ref 0.57–1)
CREAT SERPL-MCNC: 0.77 MG/DL (ref 0.57–1)
CREAT SERPL-MCNC: 0.77 MG/DL (ref 0.57–1)
CREAT SERPL-MCNC: 0.85 MG/DL (ref 0.57–1)
CREAT SERPL-MCNC: 0.92 MG/DL (ref 0.57–1)
CREAT SERPL-MCNC: 0.97 MG/DL (ref 0.57–1)
CREAT SERPL-MCNC: 1.04 MG/DL (ref 0.57–1)
CREAT SERPL-MCNC: 1.06 MG/DL (ref 0.57–1)
CREAT SERPL-MCNC: 1.06 MG/DL (ref 0.57–1)
CREAT SERPL-MCNC: 1.12 MG/DL (ref 0.57–1)
CREAT SERPL-MCNC: 1.27 MG/DL (ref 0.57–1)
CREAT SERPL-MCNC: 1.32 MG/DL (ref 0.57–1)
CREAT SERPL-MCNC: 1.43 MG/DL (ref 0.57–1)
CROSSMATCH INTERPRETATION: NORMAL
D DIMER PPP FEU-MCNC: 1.96 MCGFEU/ML (ref 0.17–0.59)
D-LACTATE SERPL-SCNC: 0.8 MMOL/L (ref 0.5–2)
D-LACTATE SERPL-SCNC: 1 MMOL/L (ref 0.5–2)
D-LACTATE SERPL-SCNC: 1.8 MMOL/L (ref 0.5–2)
D-LACTATE SERPL-SCNC: 2 MMOL/L (ref 0.5–2)
D-LACTATE SERPL-SCNC: 2.1 MMOL/L (ref 0.5–2)
D-LACTATE SERPL-SCNC: 2.2 MMOL/L (ref 0.5–2)
D-LACTATE SERPL-SCNC: 2.7 MMOL/L (ref 0.5–2)
D-LACTATE SERPL-SCNC: 3.4 MMOL/L (ref 0.5–2)
D-LACTATE SERPL-SCNC: 3.6 MMOL/L (ref 0.5–2)
DACRYOCYTES BLD QL SMEAR: ABNORMAL
DACRYOCYTES BLD QL SMEAR: ABNORMAL
DEPRECATED RDW RBC AUTO: 50.3 FL (ref 37–54)
DEPRECATED RDW RBC AUTO: 52.9 FL (ref 37–54)
DEPRECATED RDW RBC AUTO: 53.8 FL (ref 37–54)
DEPRECATED RDW RBC AUTO: 53.8 FL (ref 37–54)
DEPRECATED RDW RBC AUTO: 55.6 FL (ref 37–54)
DEPRECATED RDW RBC AUTO: 56 FL (ref 37–54)
DEPRECATED RDW RBC AUTO: 59.1 FL (ref 37–54)
DEPRECATED RDW RBC AUTO: 59.9 FL (ref 37–54)
DEPRECATED RDW RBC AUTO: 60.4 FL (ref 37–54)
DEPRECATED RDW RBC AUTO: 60.4 FL (ref 37–54)
EOSINOPHIL # BLD AUTO: 0 10*3/MM3 (ref 0–0.4)
EOSINOPHIL # BLD MANUAL: 0.1 10*3/MM3 (ref 0–0.4)
EOSINOPHIL # BLD MANUAL: 0.12 10*3/MM3 (ref 0–0.4)
EOSINOPHIL NFR BLD AUTO: 0 % (ref 0.3–6.2)
EOSINOPHIL NFR BLD AUTO: 0 % (ref 0.3–6.2)
EOSINOPHIL NFR BLD AUTO: 0.1 % (ref 0.3–6.2)
EOSINOPHIL NFR BLD MANUAL: 1 % (ref 0.3–6.2)
EOSINOPHIL NFR BLD MANUAL: 1 % (ref 0.3–6.2)
ERYTHROCYTE [DISTWIDTH] IN BLOOD BY AUTOMATED COUNT: 16 % (ref 12.3–15.4)
ERYTHROCYTE [DISTWIDTH] IN BLOOD BY AUTOMATED COUNT: 17.2 % (ref 12.3–15.4)
ERYTHROCYTE [DISTWIDTH] IN BLOOD BY AUTOMATED COUNT: 17.3 % (ref 12.3–15.4)
ERYTHROCYTE [DISTWIDTH] IN BLOOD BY AUTOMATED COUNT: 17.4 % (ref 12.3–15.4)
ERYTHROCYTE [DISTWIDTH] IN BLOOD BY AUTOMATED COUNT: 18.6 % (ref 12.3–15.4)
ERYTHROCYTE [DISTWIDTH] IN BLOOD BY AUTOMATED COUNT: 19 % (ref 12.3–15.4)
ERYTHROCYTE [DISTWIDTH] IN BLOOD BY AUTOMATED COUNT: 19.2 % (ref 12.3–15.4)
ERYTHROCYTE [DISTWIDTH] IN BLOOD BY AUTOMATED COUNT: 19.8 % (ref 12.3–15.4)
ERYTHROCYTE [DISTWIDTH] IN BLOOD BY AUTOMATED COUNT: 20.2 % (ref 12.3–15.4)
ERYTHROCYTE [DISTWIDTH] IN BLOOD BY AUTOMATED COUNT: 20.3 % (ref 12.3–15.4)
FLUAV H1 2009 PAND RNA NPH QL NAA+PROBE: NOT DETECTED
FLUAV H1 HA GENE NPH QL NAA+PROBE: NOT DETECTED
FLUAV H3 RNA NPH QL NAA+PROBE: NOT DETECTED
FLUAV SUBTYP SPEC NAA+PROBE: NOT DETECTED
FLUBV RNA ISLT QL NAA+PROBE: NOT DETECTED
GFR SERPL CREATININE-BSD FRML MDRD: 104 ML/MIN/1.73
GFR SERPL CREATININE-BSD FRML MDRD: 38 ML/MIN/1.73
GFR SERPL CREATININE-BSD FRML MDRD: 41 ML/MIN/1.73
GFR SERPL CREATININE-BSD FRML MDRD: 43 ML/MIN/1.73
GFR SERPL CREATININE-BSD FRML MDRD: 50 ML/MIN/1.73
GFR SERPL CREATININE-BSD FRML MDRD: 53 ML/MIN/1.73
GFR SERPL CREATININE-BSD FRML MDRD: 53 ML/MIN/1.73
GFR SERPL CREATININE-BSD FRML MDRD: 54 ML/MIN/1.73
GFR SERPL CREATININE-BSD FRML MDRD: 59 ML/MIN/1.73
GFR SERPL CREATININE-BSD FRML MDRD: 62 ML/MIN/1.73
GFR SERPL CREATININE-BSD FRML MDRD: 68 ML/MIN/1.73
GFR SERPL CREATININE-BSD FRML MDRD: 77 ML/MIN/1.73
GFR SERPL CREATININE-BSD FRML MDRD: 77 ML/MIN/1.73
GFR SERPL CREATININE-BSD FRML MDRD: 79 ML/MIN/1.73
GIANT PLATELETS: ABNORMAL
GLOBULIN UR ELPH-MCNC: 2.2 GM/DL
GLOBULIN UR ELPH-MCNC: 2.4 GM/DL
GLOBULIN UR ELPH-MCNC: 2.4 GM/DL
GLOBULIN UR ELPH-MCNC: 2.8 GM/DL
GLUCOSE BLDC GLUCOMTR-MCNC: 109 MG/DL (ref 70–105)
GLUCOSE BLDC GLUCOMTR-MCNC: 116 MG/DL (ref 70–105)
GLUCOSE BLDC GLUCOMTR-MCNC: 125 MG/DL (ref 70–105)
GLUCOSE BLDC GLUCOMTR-MCNC: 125 MG/DL (ref 70–105)
GLUCOSE BLDC GLUCOMTR-MCNC: 132 MG/DL (ref 70–105)
GLUCOSE BLDC GLUCOMTR-MCNC: 133 MG/DL (ref 70–105)
GLUCOSE BLDC GLUCOMTR-MCNC: 146 MG/DL (ref 70–105)
GLUCOSE BLDC GLUCOMTR-MCNC: 147 MG/DL (ref 70–105)
GLUCOSE BLDC GLUCOMTR-MCNC: 151 MG/DL (ref 70–105)
GLUCOSE BLDC GLUCOMTR-MCNC: 171 MG/DL (ref 70–105)
GLUCOSE BLDC GLUCOMTR-MCNC: 206 MG/DL (ref 70–105)
GLUCOSE BLDC GLUCOMTR-MCNC: 60 MG/DL (ref 70–105)
GLUCOSE BLDC GLUCOMTR-MCNC: 73 MG/DL (ref 70–105)
GLUCOSE BLDC GLUCOMTR-MCNC: 96 MG/DL (ref 70–105)
GLUCOSE SERPL-MCNC: 107 MG/DL (ref 65–99)
GLUCOSE SERPL-MCNC: 108 MG/DL (ref 65–99)
GLUCOSE SERPL-MCNC: 125 MG/DL (ref 65–99)
GLUCOSE SERPL-MCNC: 125 MG/DL (ref 65–99)
GLUCOSE SERPL-MCNC: 138 MG/DL (ref 65–99)
GLUCOSE SERPL-MCNC: 140 MG/DL (ref 65–99)
GLUCOSE SERPL-MCNC: 152 MG/DL (ref 65–99)
GLUCOSE SERPL-MCNC: 165 MG/DL (ref 65–99)
GLUCOSE SERPL-MCNC: 165 MG/DL (ref 65–99)
GLUCOSE SERPL-MCNC: 177 MG/DL (ref 65–99)
GLUCOSE SERPL-MCNC: 73 MG/DL (ref 65–99)
GLUCOSE UR STRIP-MCNC: NEGATIVE MG/DL
GRAM STN SPEC: ABNORMAL
HADV DNA SPEC NAA+PROBE: NOT DETECTED
HBA1C MFR BLD: 5.9 % (ref 3.5–5.6)
HCO3 BLDA-SCNC: 36.7 MMOL/L (ref 21–28)
HCO3 BLDA-SCNC: 45.5 MMOL/L (ref 21–28)
HCOV 229E RNA SPEC QL NAA+PROBE: NOT DETECTED
HCOV HKU1 RNA SPEC QL NAA+PROBE: NOT DETECTED
HCOV NL63 RNA SPEC QL NAA+PROBE: NOT DETECTED
HCOV OC43 RNA SPEC QL NAA+PROBE: NOT DETECTED
HCT VFR BLD AUTO: 21.3 % (ref 34–46.6)
HCT VFR BLD AUTO: 21.6 % (ref 34–46.6)
HCT VFR BLD AUTO: 25.2 % (ref 34–46.6)
HCT VFR BLD AUTO: 28.2 % (ref 34–46.6)
HCT VFR BLD AUTO: 29.6 % (ref 34–46.6)
HCT VFR BLD AUTO: 30.2 % (ref 34–46.6)
HCT VFR BLD AUTO: 30.3 % (ref 34–46.6)
HCT VFR BLD AUTO: 32.1 % (ref 34–46.6)
HCT VFR BLD AUTO: 32.8 % (ref 34–46.6)
HCT VFR BLD AUTO: 34.3 % (ref 34–46.6)
HEMODILUTION: NO
HEMODILUTION: NO
HGB BLD-MCNC: 10.2 G/DL (ref 12–15.9)
HGB BLD-MCNC: 10.7 G/DL (ref 12–15.9)
HGB BLD-MCNC: 11.3 G/DL (ref 12–15.9)
HGB BLD-MCNC: 6.8 G/DL (ref 12–15.9)
HGB BLD-MCNC: 7 G/DL (ref 12–15.9)
HGB BLD-MCNC: 8.2 G/DL (ref 12–15.9)
HGB BLD-MCNC: 9 G/DL (ref 12–15.9)
HGB BLD-MCNC: 9.5 G/DL (ref 12–15.9)
HGB BLD-MCNC: 9.6 G/DL (ref 12–15.9)
HGB BLD-MCNC: 9.9 G/DL (ref 12–15.9)
HGB UR QL STRIP.AUTO: NEGATIVE
HMPV RNA NPH QL NAA+NON-PROBE: NOT DETECTED
HOLD SPECIMEN: NORMAL
HPIV1 RNA SPEC QL NAA+PROBE: NOT DETECTED
HPIV2 RNA SPEC QL NAA+PROBE: NOT DETECTED
HPIV3 RNA NPH QL NAA+PROBE: NOT DETECTED
HPIV4 P GENE NPH QL NAA+PROBE: NOT DETECTED
INHALED O2 CONCENTRATION: 100 %
INHALED O2 CONCENTRATION: 28 %
INR PPP: 0.96 (ref 0.9–1.1)
KETONES UR QL STRIP: NEGATIVE
L PNEUMO1 AG UR QL IA: NEGATIVE
L PNEUMO1 AG UR QL IA: NEGATIVE
LACTATE HOLD SPECIMEN: NORMAL
LACTATE HOLD SPECIMEN: NORMAL
LARGE PLATELETS: ABNORMAL
LARGE PLATELETS: ABNORMAL
LEUKOCYTE ESTERASE UR QL STRIP.AUTO: NEGATIVE
LYMPHOCYTES # BLD AUTO: 0.1 10*3/MM3 (ref 0.7–3.1)
LYMPHOCYTES # BLD AUTO: 0.2 10*3/MM3 (ref 0.7–3.1)
LYMPHOCYTES # BLD AUTO: 0.6 10*3/MM3 (ref 0.7–3.1)
LYMPHOCYTES # BLD MANUAL: 0.41 10*3/MM3 (ref 0.7–3.1)
LYMPHOCYTES # BLD MANUAL: 0.49 10*3/MM3 (ref 0.7–3.1)
LYMPHOCYTES # BLD MANUAL: 0.58 10*3/MM3 (ref 0.7–3.1)
LYMPHOCYTES # BLD MANUAL: 0.86 10*3/MM3 (ref 0.7–3.1)
LYMPHOCYTES # BLD MANUAL: 0.95 10*3/MM3 (ref 0.7–3.1)
LYMPHOCYTES # BLD MANUAL: 1.18 10*3/MM3 (ref 0.7–3.1)
LYMPHOCYTES # BLD MANUAL: 2.36 10*3/MM3 (ref 0.7–3.1)
LYMPHOCYTES NFR BLD AUTO: 1.4 % (ref 19.6–45.3)
LYMPHOCYTES NFR BLD AUTO: 1.9 % (ref 19.6–45.3)
LYMPHOCYTES NFR BLD AUTO: 4.1 % (ref 19.6–45.3)
LYMPHOCYTES NFR BLD MANUAL: 1 % (ref 5–12)
LYMPHOCYTES NFR BLD MANUAL: 19 % (ref 19.6–45.3)
LYMPHOCYTES NFR BLD MANUAL: 2 % (ref 5–12)
LYMPHOCYTES NFR BLD MANUAL: 2 % (ref 5–12)
LYMPHOCYTES NFR BLD MANUAL: 3 % (ref 5–12)
LYMPHOCYTES NFR BLD MANUAL: 4 % (ref 19.6–45.3)
LYMPHOCYTES NFR BLD MANUAL: 4 % (ref 19.6–45.3)
LYMPHOCYTES NFR BLD MANUAL: 4 % (ref 5–12)
LYMPHOCYTES NFR BLD MANUAL: 5 % (ref 5–12)
LYMPHOCYTES NFR BLD MANUAL: 6 % (ref 19.6–45.3)
LYMPHOCYTES NFR BLD MANUAL: 8 % (ref 19.6–45.3)
LYMPHOCYTES NFR BLD MANUAL: 8 % (ref 19.6–45.3)
LYMPHOCYTES NFR BLD MANUAL: 9 % (ref 19.6–45.3)
M PNEUMO IGG SER IA-ACNC: NOT DETECTED
MAGNESIUM SERPL-MCNC: 1.6 MG/DL (ref 1.6–2.6)
MAGNESIUM SERPL-MCNC: 1.8 MG/DL (ref 1.6–2.6)
MAGNESIUM SERPL-MCNC: 2 MG/DL (ref 1.6–2.6)
MAGNESIUM SERPL-MCNC: 2.5 MG/DL (ref 1.6–2.6)
MAGNESIUM SERPL-MCNC: 3 MG/DL (ref 1.6–2.6)
MCH RBC QN AUTO: 27.4 PG (ref 26.6–33)
MCH RBC QN AUTO: 27.4 PG (ref 26.6–33)
MCH RBC QN AUTO: 27.5 PG (ref 26.6–33)
MCH RBC QN AUTO: 27.5 PG (ref 26.6–33)
MCH RBC QN AUTO: 27.8 PG (ref 26.6–33)
MCH RBC QN AUTO: 27.9 PG (ref 26.6–33)
MCH RBC QN AUTO: 28 PG (ref 26.6–33)
MCH RBC QN AUTO: 28.1 PG (ref 26.6–33)
MCH RBC QN AUTO: 28.3 PG (ref 26.6–33)
MCH RBC QN AUTO: 29 PG (ref 26.6–33)
MCHC RBC AUTO-ENTMCNC: 31.7 G/DL (ref 31.5–35.7)
MCHC RBC AUTO-ENTMCNC: 31.9 G/DL (ref 31.5–35.7)
MCHC RBC AUTO-ENTMCNC: 32.2 G/DL (ref 31.5–35.7)
MCHC RBC AUTO-ENTMCNC: 32.4 G/DL (ref 31.5–35.7)
MCHC RBC AUTO-ENTMCNC: 32.5 G/DL (ref 31.5–35.7)
MCHC RBC AUTO-ENTMCNC: 32.6 G/DL (ref 31.5–35.7)
MCHC RBC AUTO-ENTMCNC: 32.7 G/DL (ref 31.5–35.7)
MCHC RBC AUTO-ENTMCNC: 32.8 G/DL (ref 31.5–35.7)
MCV RBC AUTO: 83.4 FL (ref 79–97)
MCV RBC AUTO: 85.6 FL (ref 79–97)
MCV RBC AUTO: 85.7 FL (ref 79–97)
MCV RBC AUTO: 86.1 FL (ref 79–97)
MCV RBC AUTO: 86.2 FL (ref 79–97)
MCV RBC AUTO: 86.7 FL (ref 79–97)
MCV RBC AUTO: 86.8 FL (ref 79–97)
MCV RBC AUTO: 87 FL (ref 79–97)
MCV RBC AUTO: 88.1 FL (ref 79–97)
MCV RBC AUTO: 88.7 FL (ref 79–97)
METAMYELOCYTES NFR BLD MANUAL: 1 % (ref 0–0)
METAMYELOCYTES NFR BLD MANUAL: 2 % (ref 0–0)
MICROCYTES BLD QL: ABNORMAL
MODALITY: ABNORMAL
MODALITY: ABNORMAL
MONOCYTES # BLD AUTO: 0.11 10*3/MM3 (ref 0.1–0.9)
MONOCYTES # BLD AUTO: 0.19 10*3/MM3 (ref 0.1–0.9)
MONOCYTES # BLD AUTO: 0.29 10*3/MM3 (ref 0.1–0.9)
MONOCYTES # BLD AUTO: 0.31 10*3/MM3 (ref 0.1–0.9)
MONOCYTES # BLD AUTO: 0.4 10*3/MM3 (ref 0.1–0.9)
MONOCYTES # BLD AUTO: 0.4 10*3/MM3 (ref 0.1–0.9)
MONOCYTES # BLD AUTO: 0.43 10*3/MM3 (ref 0.1–0.9)
MONOCYTES # BLD AUTO: 0.6 10*3/MM3 (ref 0.1–0.9)
MONOCYTES # BLD AUTO: 0.61 10*3/MM3 (ref 0.1–0.9)
MONOCYTES NFR BLD AUTO: 4 % (ref 5–12)
MONOCYTES NFR BLD AUTO: 4.1 % (ref 5–12)
MONOCYTES NFR BLD AUTO: 4.5 % (ref 5–12)
MRSA DNA SPEC QL NAA+PROBE: NORMAL
MRSA DNA SPEC QL NAA+PROBE: NORMAL
MYELOCYTES NFR BLD MANUAL: 1 % (ref 0–0)
MYELOCYTES NFR BLD MANUAL: 2 % (ref 0–0)
NEUTROPHILS # BLD AUTO: 10.98 10*3/MM3 (ref 1.7–7)
NEUTROPHILS # BLD AUTO: 13.08 10*3/MM3 (ref 1.7–7)
NEUTROPHILS # BLD AUTO: 8.83 10*3/MM3 (ref 1.7–7)
NEUTROPHILS # BLD AUTO: 8.9 10*3/MM3 (ref 1.7–7)
NEUTROPHILS # BLD AUTO: 9.18 10*3/MM3 (ref 1.7–7)
NEUTROPHILS # BLD AUTO: 9.42 10*3/MM3 (ref 1.7–7)
NEUTROPHILS # BLD AUTO: 9.92 10*3/MM3 (ref 1.7–7)
NEUTROPHILS NFR BLD AUTO: 12.9 10*3/MM3 (ref 1.7–7)
NEUTROPHILS NFR BLD AUTO: 8.4 10*3/MM3 (ref 1.7–7)
NEUTROPHILS NFR BLD AUTO: 9 10*3/MM3 (ref 1.7–7)
NEUTROPHILS NFR BLD AUTO: 91 % (ref 42.7–76)
NEUTROPHILS NFR BLD AUTO: 93.9 % (ref 42.7–76)
NEUTROPHILS NFR BLD AUTO: 94.1 % (ref 42.7–76)
NEUTROPHILS NFR BLD MANUAL: 73 % (ref 42.7–76)
NEUTROPHILS NFR BLD MANUAL: 75 % (ref 42.7–76)
NEUTROPHILS NFR BLD MANUAL: 78 % (ref 42.7–76)
NEUTROPHILS NFR BLD MANUAL: 79 % (ref 42.7–76)
NEUTROPHILS NFR BLD MANUAL: 87 % (ref 42.7–76)
NEUTROPHILS NFR BLD MANUAL: 90 % (ref 42.7–76)
NEUTROPHILS NFR BLD MANUAL: 92 % (ref 42.7–76)
NEUTS BAND NFR BLD MANUAL: 16 % (ref 0–5)
NEUTS BAND NFR BLD MANUAL: 2 % (ref 0–5)
NEUTS BAND NFR BLD MANUAL: 3 % (ref 0–5)
NEUTS BAND NFR BLD MANUAL: 9 % (ref 0–5)
NEUTS BAND NFR BLD MANUAL: 9 % (ref 0–5)
NEUTS VAC BLD QL SMEAR: ABNORMAL
NITRITE UR QL STRIP: NEGATIVE
NRBC BLD AUTO-RTO: 0.1 /100 WBC (ref 0–0.2)
NRBC BLD AUTO-RTO: 0.3 /100 WBC (ref 0–0.2)
NRBC BLD AUTO-RTO: 0.3 /100 WBC (ref 0–0.2)
NRBC SPEC MANUAL: 0 /100 WBC (ref 0–0.2)
NRBC SPEC MANUAL: 1 /100 WBC (ref 0–0.2)
NT-PROBNP SERPL-MCNC: 1165 PG/ML (ref 0–900)
NT-PROBNP SERPL-MCNC: 1850 PG/ML (ref 0–900)
NT-PROBNP SERPL-MCNC: 453.3 PG/ML (ref 0–900)
OVALOCYTES BLD QL SMEAR: ABNORMAL
PCO2 BLDA: 47.3 MM HG (ref 35–48)
PCO2 BLDA: 54 MM HG (ref 35–48)
PH BLDA: 7.5 PH UNITS (ref 7.35–7.45)
PH BLDA: 7.53 PH UNITS (ref 7.35–7.45)
PH UR STRIP.AUTO: 6.5 [PH] (ref 5–8)
PHOSPHATE SERPL-MCNC: 1.6 MG/DL (ref 2.5–4.5)
PHOSPHATE SERPL-MCNC: 2.1 MG/DL (ref 2.5–4.5)
PHOSPHATE SERPL-MCNC: 2.5 MG/DL (ref 2.5–4.5)
PHOSPHATE SERPL-MCNC: 2.6 MG/DL (ref 2.5–4.5)
PHOSPHATE SERPL-MCNC: 2.6 MG/DL (ref 2.5–4.5)
PLAT MORPH BLD: NORMAL
PLATELET # BLD AUTO: 186 10*3/MM3 (ref 140–450)
PLATELET # BLD AUTO: 224 10*3/MM3 (ref 140–450)
PLATELET # BLD AUTO: 224 10*3/MM3 (ref 140–450)
PLATELET # BLD AUTO: 253 10*3/MM3 (ref 140–450)
PLATELET # BLD AUTO: 284 10*3/MM3 (ref 140–450)
PLATELET # BLD AUTO: 290 10*3/MM3 (ref 140–450)
PLATELET # BLD AUTO: 291 10*3/MM3 (ref 140–450)
PLATELET # BLD AUTO: 299 10*3/MM3 (ref 140–450)
PLATELET # BLD AUTO: 318 10*3/MM3 (ref 140–450)
PLATELET # BLD AUTO: 326 10*3/MM3 (ref 140–450)
PMV BLD AUTO: 6.3 FL (ref 6–12)
PMV BLD AUTO: 6.7 FL (ref 6–12)
PMV BLD AUTO: 6.8 FL (ref 6–12)
PMV BLD AUTO: 7.3 FL (ref 6–12)
PMV BLD AUTO: 7.6 FL (ref 6–12)
PMV BLD AUTO: 7.6 FL (ref 6–12)
PMV BLD AUTO: 7.7 FL (ref 6–12)
PMV BLD AUTO: 7.8 FL (ref 6–12)
PO2 BLDA: 281 MM HG (ref 83–108)
PO2 BLDA: 90.8 MM HG (ref 83–108)
POIKILOCYTOSIS BLD QL SMEAR: ABNORMAL
POLYCHROMASIA BLD QL SMEAR: ABNORMAL
POTASSIUM SERPL-SCNC: 2.4 MMOL/L (ref 3.5–5.2)
POTASSIUM SERPL-SCNC: 2.7 MMOL/L (ref 3.5–5.2)
POTASSIUM SERPL-SCNC: 2.8 MMOL/L (ref 3.5–5.2)
POTASSIUM SERPL-SCNC: 3.2 MMOL/L (ref 3.5–5.2)
POTASSIUM SERPL-SCNC: 3.3 MMOL/L (ref 3.5–5.2)
POTASSIUM SERPL-SCNC: 3.3 MMOL/L (ref 3.5–5.2)
POTASSIUM SERPL-SCNC: 3.4 MMOL/L (ref 3.5–5.2)
POTASSIUM SERPL-SCNC: 3.5 MMOL/L (ref 3.5–5.2)
POTASSIUM SERPL-SCNC: 3.6 MMOL/L (ref 3.5–5.2)
POTASSIUM SERPL-SCNC: 3.9 MMOL/L (ref 3.5–5.2)
POTASSIUM SERPL-SCNC: 4 MMOL/L (ref 3.5–5.2)
POTASSIUM SERPL-SCNC: 4.1 MMOL/L (ref 3.5–5.2)
POTASSIUM SERPL-SCNC: 4.5 MMOL/L (ref 3.5–5.2)
POTASSIUM SERPL-SCNC: 4.6 MMOL/L (ref 3.5–5.2)
PROCALCITONIN SERPL-MCNC: 0.09 NG/ML (ref 0–0.25)
PROCALCITONIN SERPL-MCNC: 0.3 NG/ML (ref 0–0.25)
PROCALCITONIN SERPL-MCNC: 0.32 NG/ML (ref 0–0.25)
PROT SERPL-MCNC: 6.3 G/DL (ref 6–8.5)
PROT SERPL-MCNC: 6.4 G/DL (ref 6–8.5)
PROT SERPL-MCNC: 6.5 G/DL (ref 6–8.5)
PROT SERPL-MCNC: 7.1 G/DL (ref 6–8.5)
PROT UR QL STRIP: NEGATIVE
PROTHROMBIN TIME: 10.2 SECONDS (ref 9.6–11.7)
RBC # BLD AUTO: 2.42 10*6/MM3 (ref 3.77–5.28)
RBC # BLD AUTO: 2.49 10*6/MM3 (ref 3.77–5.28)
RBC # BLD AUTO: 2.94 10*6/MM3 (ref 3.77–5.28)
RBC # BLD AUTO: 3.28 10*6/MM3 (ref 3.77–5.28)
RBC # BLD AUTO: 3.4 10*6/MM3 (ref 3.77–5.28)
RBC # BLD AUTO: 3.42 10*6/MM3 (ref 3.77–5.28)
RBC # BLD AUTO: 3.5 10*6/MM3 (ref 3.77–5.28)
RBC # BLD AUTO: 3.7 10*6/MM3 (ref 3.77–5.28)
RBC # BLD AUTO: 3.83 10*6/MM3 (ref 3.77–5.28)
RBC # BLD AUTO: 4.12 10*6/MM3 (ref 3.77–5.28)
REF LAB TEST METHOD: NORMAL
RH BLD: POSITIVE
RHINOVIRUS RNA SPEC NAA+PROBE: NOT DETECTED
RSV RNA NPH QL NAA+NON-PROBE: NOT DETECTED
S PNEUM AG SPEC QL LA: NEGATIVE
S PNEUM AG SPEC QL LA: POSITIVE
SAO2 % BLDCOA: 97.7 % (ref 94–98)
SAO2 % BLDCOA: 99.9 % (ref 94–98)
SARS-COV-2 RNA PNL SPEC NAA+PROBE: NOT DETECTED
SARS-COV-2 RNA RESP QL NAA+PROBE: NOT DETECTED
SCAN SLIDE: NORMAL
SMALL PLATELETS BLD QL SMEAR: ADEQUATE
SMALL PLATELETS BLD QL SMEAR: ADEQUATE
SODIUM SERPL-SCNC: 132 MMOL/L (ref 136–145)
SODIUM SERPL-SCNC: 133 MMOL/L (ref 136–145)
SODIUM SERPL-SCNC: 133 MMOL/L (ref 136–145)
SODIUM SERPL-SCNC: 134 MMOL/L (ref 136–145)
SODIUM SERPL-SCNC: 134 MMOL/L (ref 136–145)
SODIUM SERPL-SCNC: 135 MMOL/L (ref 136–145)
SODIUM SERPL-SCNC: 137 MMOL/L (ref 136–145)
SODIUM SERPL-SCNC: 138 MMOL/L (ref 136–145)
SODIUM SERPL-SCNC: 140 MMOL/L (ref 136–145)
SODIUM SERPL-SCNC: 141 MMOL/L (ref 136–145)
SODIUM SERPL-SCNC: 141 MMOL/L (ref 136–145)
SP GR UR STRIP: 1.01 (ref 1–1.03)
STOMATOCYTES BLD QL SMEAR: ABNORMAL
STOMATOCYTES BLD QL SMEAR: ABNORMAL
T&S EXPIRATION DATE: NORMAL
TROPONIN T SERPL-MCNC: 0.02 NG/ML (ref 0–0.03)
TROPONIN T SERPL-MCNC: 0.03 NG/ML (ref 0–0.03)
TROPONIN T SERPL-MCNC: 0.03 NG/ML (ref 0–0.03)
TROPONIN T SERPL-MCNC: 0.04 NG/ML (ref 0–0.03)
TROPONIN T SERPL-MCNC: 0.1 NG/ML (ref 0–0.03)
TROPONIN T SERPL-MCNC: 0.11 NG/ML (ref 0–0.03)
TROPONIN T SERPL-MCNC: <0.01 NG/ML (ref 0–0.03)
TROPONIN T SERPL-MCNC: <0.01 NG/ML (ref 0–0.03)
TSH SERPL DL<=0.05 MIU/L-ACNC: 0.41 UIU/ML (ref 0.27–4.2)
UNIT  ABO: NORMAL
UNIT  RH: NORMAL
UROBILINOGEN UR QL STRIP: NORMAL
VARIANT LYMPHS NFR BLD MANUAL: 4 % (ref 0–5)
WBC # BLD AUTO: 10.2 10*3/MM3 (ref 3.4–10.8)
WBC # BLD AUTO: 10.6 10*3/MM3 (ref 3.4–10.8)
WBC # BLD AUTO: 10.7 10*3/MM3 (ref 3.4–10.8)
WBC # BLD AUTO: 12.2 10*3/MM3 (ref 3.4–10.8)
WBC # BLD AUTO: 12.4 10*3/MM3 (ref 3.4–10.8)
WBC # BLD AUTO: 14.1 10*3/MM3 (ref 3.4–10.8)
WBC # BLD AUTO: 14.7 10*3/MM3 (ref 3.4–10.8)
WBC # BLD AUTO: 8.9 10*3/MM3 (ref 3.4–10.8)
WBC # BLD AUTO: 9.6 10*3/MM3 (ref 3.4–10.8)
WBC # BLD AUTO: 9.6 10*3/MM3 (ref 3.4–10.8)
WBC MORPH BLD: NORMAL
WHOLE BLOOD HOLD SPECIMEN: NORMAL

## 2020-01-01 PROCEDURE — 80048 BASIC METABOLIC PNL TOTAL CA: CPT | Performed by: NURSE PRACTITIONER

## 2020-01-01 PROCEDURE — 85025 COMPLETE CBC W/AUTO DIFF WBC: CPT | Performed by: HOSPITALIST

## 2020-01-01 PROCEDURE — 63710000001 ONDANSETRON PER 8 MG: Performed by: NURSE PRACTITIONER

## 2020-01-01 PROCEDURE — 82803 BLOOD GASES ANY COMBINATION: CPT

## 2020-01-01 PROCEDURE — 86900 BLOOD TYPING SEROLOGIC ABO: CPT | Performed by: INTERNAL MEDICINE

## 2020-01-01 PROCEDURE — 85025 COMPLETE CBC W/AUTO DIFF WBC: CPT | Performed by: PHYSICIAN ASSISTANT

## 2020-01-01 PROCEDURE — 94799 UNLISTED PULMONARY SVC/PX: CPT

## 2020-01-01 PROCEDURE — 87899 AGENT NOS ASSAY W/OPTIC: CPT | Performed by: PHYSICIAN ASSISTANT

## 2020-01-01 PROCEDURE — 86850 RBC ANTIBODY SCREEN: CPT | Performed by: INTERNAL MEDICINE

## 2020-01-01 PROCEDURE — 25010000002 LORAZEPAM PER 2 MG: Performed by: INTERNAL MEDICINE

## 2020-01-01 PROCEDURE — 99231 SBSQ HOSP IP/OBS SF/LOW 25: CPT | Performed by: INTERNAL MEDICINE

## 2020-01-01 PROCEDURE — 80048 BASIC METABOLIC PNL TOTAL CA: CPT | Performed by: PHYSICIAN ASSISTANT

## 2020-01-01 PROCEDURE — 83735 ASSAY OF MAGNESIUM: CPT | Performed by: PHYSICIAN ASSISTANT

## 2020-01-01 PROCEDURE — 25010000002 ENOXAPARIN PER 10 MG: Performed by: NURSE PRACTITIONER

## 2020-01-01 PROCEDURE — 25010000002 VANCOMYCIN 10 G RECONSTITUTED SOLUTION: Performed by: EMERGENCY MEDICINE

## 2020-01-01 PROCEDURE — 99406 BEHAV CHNG SMOKING 3-10 MIN: CPT

## 2020-01-01 PROCEDURE — 25010000002 METHYLPREDNISOLONE PER 125 MG: Performed by: NURSE PRACTITIONER

## 2020-01-01 PROCEDURE — 87205 SMEAR GRAM STAIN: CPT | Performed by: PHYSICIAN ASSISTANT

## 2020-01-01 PROCEDURE — 87635 SARS-COV-2 COVID-19 AMP PRB: CPT | Performed by: EMERGENCY MEDICINE

## 2020-01-01 PROCEDURE — 85007 BL SMEAR W/DIFF WBC COUNT: CPT | Performed by: PHYSICIAN ASSISTANT

## 2020-01-01 PROCEDURE — 84484 ASSAY OF TROPONIN QUANT: CPT | Performed by: NURSE PRACTITIONER

## 2020-01-01 PROCEDURE — 99232 SBSQ HOSP IP/OBS MODERATE 35: CPT | Performed by: HOSPITALIST

## 2020-01-01 PROCEDURE — 99233 SBSQ HOSP IP/OBS HIGH 50: CPT | Performed by: HOSPITALIST

## 2020-01-01 PROCEDURE — 99284 EMERGENCY DEPT VISIT MOD MDM: CPT

## 2020-01-01 PROCEDURE — 85025 COMPLETE CBC W/AUTO DIFF WBC: CPT | Performed by: EMERGENCY MEDICINE

## 2020-01-01 PROCEDURE — 25010000002 HYDROMORPHONE 1 MG/ML SOLUTION: Performed by: INTERNAL MEDICINE

## 2020-01-01 PROCEDURE — 84132 ASSAY OF SERUM POTASSIUM: CPT | Performed by: PHYSICIAN ASSISTANT

## 2020-01-01 PROCEDURE — 93306 TTE W/DOPPLER COMPLETE: CPT | Performed by: INTERNAL MEDICINE

## 2020-01-01 PROCEDURE — 25010000002 METHYLPREDNISOLONE PER 40 MG: Performed by: INTERNAL MEDICINE

## 2020-01-01 PROCEDURE — 93005 ELECTROCARDIOGRAM TRACING: CPT

## 2020-01-01 PROCEDURE — 25010000002 METHYLPREDNISOLONE PER 40 MG: Performed by: HOSPITALIST

## 2020-01-01 PROCEDURE — 84100 ASSAY OF PHOSPHORUS: CPT | Performed by: PHYSICIAN ASSISTANT

## 2020-01-01 PROCEDURE — 82962 GLUCOSE BLOOD TEST: CPT

## 2020-01-01 PROCEDURE — 25010000002 MORPHINE PER 10 MG: Performed by: INTERNAL MEDICINE

## 2020-01-01 PROCEDURE — 83605 ASSAY OF LACTIC ACID: CPT

## 2020-01-01 PROCEDURE — 85025 COMPLETE CBC W/AUTO DIFF WBC: CPT | Performed by: INTERNAL MEDICINE

## 2020-01-01 PROCEDURE — 80048 BASIC METABOLIC PNL TOTAL CA: CPT | Performed by: INTERNAL MEDICINE

## 2020-01-01 PROCEDURE — 80053 COMPREHEN METABOLIC PANEL: CPT

## 2020-01-01 PROCEDURE — 94640 AIRWAY INHALATION TREATMENT: CPT

## 2020-01-01 PROCEDURE — 83880 ASSAY OF NATRIURETIC PEPTIDE: CPT | Performed by: EMERGENCY MEDICINE

## 2020-01-01 PROCEDURE — 71045 X-RAY EXAM CHEST 1 VIEW: CPT

## 2020-01-01 PROCEDURE — 63710000001 PROCHLORPERAZINE MALEATE PER 5 MG: Performed by: NURSE PRACTITIONER

## 2020-01-01 PROCEDURE — 25010000002 HYDROMORPHONE PER 4 MG: Performed by: INTERNAL MEDICINE

## 2020-01-01 PROCEDURE — 99233 SBSQ HOSP IP/OBS HIGH 50: CPT | Performed by: INTERNAL MEDICINE

## 2020-01-01 PROCEDURE — 84484 ASSAY OF TROPONIN QUANT: CPT | Performed by: PHYSICIAN ASSISTANT

## 2020-01-01 PROCEDURE — 99223 1ST HOSP IP/OBS HIGH 75: CPT | Performed by: HOSPITALIST

## 2020-01-01 PROCEDURE — 83605 ASSAY OF LACTIC ACID: CPT | Performed by: NURSE PRACTITIONER

## 2020-01-01 PROCEDURE — 86923 COMPATIBILITY TEST ELECTRIC: CPT

## 2020-01-01 PROCEDURE — 87076 CULTURE ANAEROBE IDENT EACH: CPT | Performed by: PHYSICIAN ASSISTANT

## 2020-01-01 PROCEDURE — 25010000002 CEFTRIAXONE PER 250 MG: Performed by: INTERNAL MEDICINE

## 2020-01-01 PROCEDURE — 99222 1ST HOSP IP/OBS MODERATE 55: CPT | Performed by: INTERNAL MEDICINE

## 2020-01-01 PROCEDURE — 83735 ASSAY OF MAGNESIUM: CPT | Performed by: EMERGENCY MEDICINE

## 2020-01-01 PROCEDURE — 99239 HOSP IP/OBS DSCHRG MGMT >30: CPT | Performed by: INTERNAL MEDICINE

## 2020-01-01 PROCEDURE — C1751 CATH, INF, PER/CENT/MIDLINE: HCPCS

## 2020-01-01 PROCEDURE — 71275 CT ANGIOGRAPHY CHEST: CPT

## 2020-01-01 PROCEDURE — 25010000002 PIPERACILLIN SOD-TAZOBACTAM PER 1 G: Performed by: INTERNAL MEDICINE

## 2020-01-01 PROCEDURE — G0378 HOSPITAL OBSERVATION PER HR: HCPCS

## 2020-01-01 PROCEDURE — 84145 PROCALCITONIN (PCT): CPT | Performed by: PHYSICIAN ASSISTANT

## 2020-01-01 PROCEDURE — 25010000002 ONDANSETRON PER 1 MG: Performed by: EMERGENCY MEDICINE

## 2020-01-01 PROCEDURE — 36600 WITHDRAWAL OF ARTERIAL BLOOD: CPT

## 2020-01-01 PROCEDURE — 63710000001 PREDNISONE PER 1 MG: Performed by: PHYSICIAN ASSISTANT

## 2020-01-01 PROCEDURE — 36430 TRANSFUSION BLD/BLD COMPNT: CPT

## 2020-01-01 PROCEDURE — 93005 ELECTROCARDIOGRAM TRACING: CPT | Performed by: EMERGENCY MEDICINE

## 2020-01-01 PROCEDURE — 36410 VNPNXR 3YR/> PHY/QHP DX/THER: CPT

## 2020-01-01 PROCEDURE — 86900 BLOOD TYPING SEROLOGIC ABO: CPT

## 2020-01-01 PROCEDURE — 63710000001 PREDNISONE PER 1 MG: Performed by: NURSE PRACTITIONER

## 2020-01-01 PROCEDURE — 85730 THROMBOPLASTIN TIME PARTIAL: CPT | Performed by: EMERGENCY MEDICINE

## 2020-01-01 PROCEDURE — 85025 COMPLETE CBC W/AUTO DIFF WBC: CPT | Performed by: NURSE PRACTITIONER

## 2020-01-01 PROCEDURE — 25010000002 CEFEPIME PER 500 MG: Performed by: EMERGENCY MEDICINE

## 2020-01-01 PROCEDURE — 25010000002 ONDANSETRON PER 1 MG: Performed by: PHYSICIAN ASSISTANT

## 2020-01-01 PROCEDURE — 87040 BLOOD CULTURE FOR BACTERIA: CPT | Performed by: EMERGENCY MEDICINE

## 2020-01-01 PROCEDURE — 86901 BLOOD TYPING SEROLOGIC RH(D): CPT

## 2020-01-01 PROCEDURE — 0 IOPAMIDOL PER 1 ML: Performed by: EMERGENCY MEDICINE

## 2020-01-01 PROCEDURE — 82565 ASSAY OF CREATININE: CPT | Performed by: NURSE PRACTITIONER

## 2020-01-01 PROCEDURE — 84443 ASSAY THYROID STIM HORMONE: CPT | Performed by: PHYSICIAN ASSISTANT

## 2020-01-01 PROCEDURE — 83605 ASSAY OF LACTIC ACID: CPT | Performed by: INTERNAL MEDICINE

## 2020-01-01 PROCEDURE — 84484 ASSAY OF TROPONIN QUANT: CPT | Performed by: EMERGENCY MEDICINE

## 2020-01-01 PROCEDURE — 0099U HC BIOFIRE FILMARRAY RESP PANEL 1: CPT | Performed by: PHYSICIAN ASSISTANT

## 2020-01-01 PROCEDURE — 25010000002 CEFEPIME PER 500 MG: Performed by: INTERNAL MEDICINE

## 2020-01-01 PROCEDURE — U0004 COV-19 TEST NON-CDC HGH THRU: HCPCS | Performed by: INTERNAL MEDICINE

## 2020-01-01 PROCEDURE — 87641 MR-STAPH DNA AMP PROBE: CPT | Performed by: NURSE PRACTITIONER

## 2020-01-01 PROCEDURE — 99232 SBSQ HOSP IP/OBS MODERATE 35: CPT | Performed by: INTERNAL MEDICINE

## 2020-01-01 PROCEDURE — 85610 PROTHROMBIN TIME: CPT | Performed by: EMERGENCY MEDICINE

## 2020-01-01 PROCEDURE — 97163 PT EVAL HIGH COMPLEX 45 MIN: CPT

## 2020-01-01 PROCEDURE — 25010000002 HYDROMORPHONE PER 4 MG: Performed by: EMERGENCY MEDICINE

## 2020-01-01 PROCEDURE — 85007 BL SMEAR W/DIFF WBC COUNT: CPT | Performed by: EMERGENCY MEDICINE

## 2020-01-01 PROCEDURE — 25010000002 VANCOMYCIN 750 MG RECONSTITUTED SOLUTION 1 EACH VIAL: Performed by: NURSE PRACTITIONER

## 2020-01-01 PROCEDURE — 25010000002 ENOXAPARIN PER 10 MG: Performed by: PHYSICIAN ASSISTANT

## 2020-01-01 PROCEDURE — 99232 SBSQ HOSP IP/OBS MODERATE 35: CPT | Performed by: NURSE PRACTITIONER

## 2020-01-01 PROCEDURE — 99221 1ST HOSP IP/OBS SF/LOW 40: CPT | Performed by: INTERNAL MEDICINE

## 2020-01-01 PROCEDURE — 25010000003 MAGNESIUM SULFATE 4 GM/100ML SOLUTION: Performed by: INTERNAL MEDICINE

## 2020-01-01 PROCEDURE — 25010000002 CEFEPIME PER 500 MG: Performed by: NURSE PRACTITIONER

## 2020-01-01 PROCEDURE — 87040 BLOOD CULTURE FOR BACTERIA: CPT

## 2020-01-01 PROCEDURE — 84145 PROCALCITONIN (PCT): CPT | Performed by: NURSE PRACTITIONER

## 2020-01-01 PROCEDURE — 0202U NFCT DS 22 TRGT SARS-COV-2: CPT | Performed by: EMERGENCY MEDICINE

## 2020-01-01 PROCEDURE — 87070 CULTURE OTHR SPECIMN AEROBIC: CPT | Performed by: PHYSICIAN ASSISTANT

## 2020-01-01 PROCEDURE — 25010000002 FUROSEMIDE PER 20 MG: Performed by: EMERGENCY MEDICINE

## 2020-01-01 PROCEDURE — 99285 EMERGENCY DEPT VISIT HI MDM: CPT

## 2020-01-01 PROCEDURE — 80053 COMPREHEN METABOLIC PANEL: CPT | Performed by: EMERGENCY MEDICINE

## 2020-01-01 PROCEDURE — 87147 CULTURE TYPE IMMUNOLOGIC: CPT | Performed by: PHYSICIAN ASSISTANT

## 2020-01-01 PROCEDURE — 87899 AGENT NOS ASSAY W/OPTIC: CPT | Performed by: NURSE PRACTITIONER

## 2020-01-01 PROCEDURE — 80053 COMPREHEN METABOLIC PANEL: CPT | Performed by: HOSPITALIST

## 2020-01-01 PROCEDURE — 84145 PROCALCITONIN (PCT): CPT | Performed by: EMERGENCY MEDICINE

## 2020-01-01 PROCEDURE — 85007 BL SMEAR W/DIFF WBC COUNT: CPT

## 2020-01-01 PROCEDURE — 25010000002 METHYLPREDNISOLONE PER 125 MG: Performed by: EMERGENCY MEDICINE

## 2020-01-01 PROCEDURE — 84132 ASSAY OF SERUM POTASSIUM: CPT | Performed by: HOSPITALIST

## 2020-01-01 PROCEDURE — 86901 BLOOD TYPING SEROLOGIC RH(D): CPT | Performed by: INTERNAL MEDICINE

## 2020-01-01 PROCEDURE — 99220 PR INITIAL OBSERVATION CARE/DAY 70 MINUTES: CPT | Performed by: INTERNAL MEDICINE

## 2020-01-01 PROCEDURE — 93005 ELECTROCARDIOGRAM TRACING: CPT | Performed by: FAMILY MEDICINE

## 2020-01-01 PROCEDURE — 0100U HC BIOFIRE FILMARRAY RESP PANEL 2: CPT | Performed by: NURSE PRACTITIONER

## 2020-01-01 PROCEDURE — 83735 ASSAY OF MAGNESIUM: CPT | Performed by: INTERNAL MEDICINE

## 2020-01-01 PROCEDURE — 83036 HEMOGLOBIN GLYCOSYLATED A1C: CPT | Performed by: NURSE PRACTITIONER

## 2020-01-01 PROCEDURE — 36415 COLL VENOUS BLD VENIPUNCTURE: CPT

## 2020-01-01 PROCEDURE — 25010000002 ENOXAPARIN PER 10 MG: Performed by: EMERGENCY MEDICINE

## 2020-01-01 PROCEDURE — P9016 RBC LEUKOCYTES REDUCED: HCPCS

## 2020-01-01 PROCEDURE — 99238 HOSP IP/OBS DSCHRG MGMT 30/<: CPT | Performed by: HOSPITALIST

## 2020-01-01 PROCEDURE — 63710000001 PREDNISONE PER 1 MG: Performed by: HOSPITALIST

## 2020-01-01 PROCEDURE — 87186 SC STD MICRODIL/AGAR DIL: CPT | Performed by: PHYSICIAN ASSISTANT

## 2020-01-01 PROCEDURE — 93306 TTE W/DOPPLER COMPLETE: CPT

## 2020-01-01 PROCEDURE — 85007 BL SMEAR W/DIFF WBC COUNT: CPT | Performed by: INTERNAL MEDICINE

## 2020-01-01 PROCEDURE — 85025 COMPLETE CBC W/AUTO DIFF WBC: CPT

## 2020-01-01 PROCEDURE — 63710000001 INSULIN LISPRO (HUMAN) PER 5 UNITS: Performed by: NURSE PRACTITIONER

## 2020-01-01 PROCEDURE — 25010000002 ONDANSETRON PER 1 MG: Performed by: NURSE PRACTITIONER

## 2020-01-01 PROCEDURE — 87077 CULTURE AEROBIC IDENTIFY: CPT | Performed by: PHYSICIAN ASSISTANT

## 2020-01-01 PROCEDURE — 81003 URINALYSIS AUTO W/O SCOPE: CPT | Performed by: EMERGENCY MEDICINE

## 2020-01-01 PROCEDURE — 85007 BL SMEAR W/DIFF WBC COUNT: CPT | Performed by: HOSPITALIST

## 2020-01-01 PROCEDURE — 84132 ASSAY OF SERUM POTASSIUM: CPT | Performed by: INTERNAL MEDICINE

## 2020-01-01 PROCEDURE — 85379 FIBRIN DEGRADATION QUANT: CPT | Performed by: EMERGENCY MEDICINE

## 2020-01-01 PROCEDURE — 25010000002 CEFEPIME IN SWFI 2 GM/10ML IV PUSH SYRINGE (SIMPLE): Performed by: EMERGENCY MEDICINE

## 2020-01-01 RX ORDER — METHYLPREDNISOLONE SODIUM SUCCINATE 125 MG/2ML
125 INJECTION, POWDER, LYOPHILIZED, FOR SOLUTION INTRAMUSCULAR; INTRAVENOUS ONCE
Status: COMPLETED | OUTPATIENT
Start: 2020-01-01 | End: 2020-01-01

## 2020-01-01 RX ORDER — PROCHLORPERAZINE MALEATE 5 MG/1
10 TABLET ORAL EVERY 6 HOURS PRN
Status: DISCONTINUED | OUTPATIENT
Start: 2020-01-01 | End: 2020-01-01 | Stop reason: HOSPADM

## 2020-01-01 RX ORDER — CHOLECALCIFEROL (VITAMIN D3) 125 MCG
5 CAPSULE ORAL NIGHTLY PRN
Status: DISCONTINUED | OUTPATIENT
Start: 2020-01-01 | End: 2020-01-01 | Stop reason: HOSPADM

## 2020-01-01 RX ORDER — UREA 10 %
6 LOTION (ML) TOPICAL NIGHTLY
COMMUNITY
End: 2020-01-01 | Stop reason: HOSPADM

## 2020-01-01 RX ORDER — CEFDINIR 300 MG/1
300 CAPSULE ORAL EVERY 12 HOURS SCHEDULED
Status: DISCONTINUED | OUTPATIENT
Start: 2020-01-01 | End: 2020-01-01 | Stop reason: HOSPADM

## 2020-01-01 RX ORDER — ONDANSETRON 2 MG/ML
4 INJECTION INTRAMUSCULAR; INTRAVENOUS EVERY 6 HOURS PRN
Status: DISCONTINUED | OUTPATIENT
Start: 2020-01-01 | End: 2020-01-01 | Stop reason: HOSPADM

## 2020-01-01 RX ORDER — POTASSIUM CHLORIDE 1.5 G/1.77G
40 POWDER, FOR SOLUTION ORAL AS NEEDED
Status: DISCONTINUED | OUTPATIENT
Start: 2020-01-01 | End: 2020-01-01 | Stop reason: HOSPADM

## 2020-01-01 RX ORDER — SODIUM CHLORIDE 0.9 % (FLUSH) 0.9 %
10 SYRINGE (ML) INJECTION AS NEEDED
Status: DISCONTINUED | OUTPATIENT
Start: 2020-01-01 | End: 2020-01-01 | Stop reason: HOSPADM

## 2020-01-01 RX ORDER — POTASSIUM CHLORIDE 20 MEQ/1
40 TABLET, EXTENDED RELEASE ORAL AS NEEDED
Status: DISCONTINUED | OUTPATIENT
Start: 2020-01-01 | End: 2020-01-01 | Stop reason: HOSPADM

## 2020-01-01 RX ORDER — BACITRACIN ZINC 500 [USP'U]/G
OINTMENT TOPICAL EVERY 8 HOURS SCHEDULED
Status: DISCONTINUED | OUTPATIENT
Start: 2020-01-01 | End: 2020-01-01 | Stop reason: HOSPADM

## 2020-01-01 RX ORDER — MAGNESIUM SULFATE HEPTAHYDRATE 40 MG/ML
4 INJECTION, SOLUTION INTRAVENOUS AS NEEDED
Status: DISCONTINUED | OUTPATIENT
Start: 2020-01-01 | End: 2020-01-01 | Stop reason: HOSPADM

## 2020-01-01 RX ORDER — METHYLPREDNISOLONE SODIUM SUCCINATE 40 MG/ML
40 INJECTION, POWDER, LYOPHILIZED, FOR SOLUTION INTRAMUSCULAR; INTRAVENOUS EVERY 8 HOURS
Status: DISCONTINUED | OUTPATIENT
Start: 2020-01-01 | End: 2020-01-01

## 2020-01-01 RX ORDER — ACETAMINOPHEN 160 MG/5ML
650 SOLUTION ORAL EVERY 4 HOURS PRN
Status: DISCONTINUED | OUTPATIENT
Start: 2020-01-01 | End: 2020-01-01 | Stop reason: HOSPADM

## 2020-01-01 RX ORDER — HYDROMORPHONE HCL 110MG/55ML
1.5 PATIENT CONTROLLED ANALGESIA SYRINGE INTRAVENOUS
Status: DISCONTINUED | OUTPATIENT
Start: 2020-01-01 | End: 2020-01-01

## 2020-01-01 RX ORDER — MORPHINE SULFATE 4 MG/ML
1 INJECTION, SOLUTION INTRAMUSCULAR; INTRAVENOUS
Status: DISCONTINUED | OUTPATIENT
Start: 2020-01-01 | End: 2020-01-01 | Stop reason: HOSPADM

## 2020-01-01 RX ORDER — MORPHINE SULFATE 30 MG/1
30 TABLET, FILM COATED, EXTENDED RELEASE ORAL 2 TIMES DAILY
COMMUNITY
End: 2020-01-01 | Stop reason: HOSPADM

## 2020-01-01 RX ORDER — SODIUM CHLORIDE 0.9 % (FLUSH) 0.9 %
10 SYRINGE (ML) INJECTION EVERY 12 HOURS SCHEDULED
Status: DISCONTINUED | OUTPATIENT
Start: 2020-01-01 | End: 2020-01-01 | Stop reason: HOSPADM

## 2020-01-01 RX ORDER — BUMETANIDE 1 MG/1
0.5 TABLET ORAL DAILY
Status: DISCONTINUED | OUTPATIENT
Start: 2020-01-01 | End: 2020-01-01 | Stop reason: HOSPADM

## 2020-01-01 RX ORDER — AZITHROMYCIN 250 MG/1
500 TABLET, FILM COATED ORAL
Status: DISCONTINUED | OUTPATIENT
Start: 2020-01-01 | End: 2020-01-01

## 2020-01-01 RX ORDER — POTASSIUM CHLORIDE 20 MEQ/1
40 TABLET, EXTENDED RELEASE ORAL ONCE
Status: COMPLETED | OUTPATIENT
Start: 2020-01-01 | End: 2020-01-01

## 2020-01-01 RX ORDER — METHYLPREDNISOLONE SODIUM SUCCINATE 40 MG/ML
40 INJECTION, POWDER, LYOPHILIZED, FOR SOLUTION INTRAMUSCULAR; INTRAVENOUS EVERY 12 HOURS
Status: DISCONTINUED | OUTPATIENT
Start: 2020-01-01 | End: 2020-01-01

## 2020-01-01 RX ORDER — SENNA PLUS 8.6 MG/1
1 TABLET ORAL 2 TIMES DAILY
Status: DISCONTINUED | OUTPATIENT
Start: 2020-01-01 | End: 2020-01-01 | Stop reason: HOSPADM

## 2020-01-01 RX ORDER — ACETAMINOPHEN 650 MG/1
650 SUPPOSITORY RECTAL EVERY 4 HOURS PRN
Status: DISCONTINUED | OUTPATIENT
Start: 2020-01-01 | End: 2020-01-01 | Stop reason: HOSPADM

## 2020-01-01 RX ORDER — ONDANSETRON 4 MG/1
4 TABLET, FILM COATED ORAL EVERY 6 HOURS PRN
Status: DISCONTINUED | OUTPATIENT
Start: 2020-01-01 | End: 2020-01-01 | Stop reason: HOSPADM

## 2020-01-01 RX ORDER — SENNA PLUS 8.6 MG/1
2 TABLET ORAL NIGHTLY
COMMUNITY
End: 2020-01-01 | Stop reason: HOSPADM

## 2020-01-01 RX ORDER — MORPHINE SULFATE 15 MG/1
15 TABLET ORAL
COMMUNITY
End: 2020-01-01 | Stop reason: HOSPADM

## 2020-01-01 RX ORDER — ALBUTEROL SULFATE 2.5 MG/3ML
2.5 SOLUTION RESPIRATORY (INHALATION) ONCE
Status: COMPLETED | OUTPATIENT
Start: 2020-01-01 | End: 2020-01-01

## 2020-01-01 RX ORDER — DOCUSATE SODIUM 100 MG/1
100 CAPSULE, LIQUID FILLED ORAL 2 TIMES DAILY PRN
Status: DISCONTINUED | OUTPATIENT
Start: 2020-01-01 | End: 2020-01-01 | Stop reason: HOSPADM

## 2020-01-01 RX ORDER — MORPHINE SULFATE 2 MG/ML
2 INJECTION, SOLUTION INTRAMUSCULAR; INTRAVENOUS
Status: DISCONTINUED | OUTPATIENT
Start: 2020-01-01 | End: 2020-01-01

## 2020-01-01 RX ORDER — GUAIFENESIN/PSEUDOEPHEDRNE HCL 600MG-60MG
TABLET, EXTENDED RELEASE 12 HR ORAL
COMMUNITY
End: 2020-01-01 | Stop reason: HOSPADM

## 2020-01-01 RX ORDER — POTASSIUM CHLORIDE 1.5 G/1.77G
20 POWDER, FOR SOLUTION ORAL DAILY
Status: DISCONTINUED | OUTPATIENT
Start: 2020-01-01 | End: 2020-01-01

## 2020-01-01 RX ORDER — LORAZEPAM 2 MG/ML
0.5 CONCENTRATE ORAL
Status: DISCONTINUED | OUTPATIENT
Start: 2020-01-01 | End: 2020-01-01 | Stop reason: HOSPADM

## 2020-01-01 RX ORDER — MORPHINE SULFATE 4 MG/ML
2 INJECTION, SOLUTION INTRAMUSCULAR; INTRAVENOUS
Status: DISCONTINUED | OUTPATIENT
Start: 2020-01-01 | End: 2020-01-01 | Stop reason: HOSPADM

## 2020-01-01 RX ORDER — MAGNESIUM SULFATE HEPTAHYDRATE 40 MG/ML
4 INJECTION, SOLUTION INTRAVENOUS AS NEEDED
Status: DISCONTINUED | OUTPATIENT
Start: 2020-01-01 | End: 2020-01-01

## 2020-01-01 RX ORDER — AMLODIPINE BESYLATE 5 MG/1
5 TABLET ORAL
Status: DISCONTINUED | OUTPATIENT
Start: 2020-01-01 | End: 2020-01-01

## 2020-01-01 RX ORDER — BISACODYL 5 MG/1
5 TABLET, DELAYED RELEASE ORAL DAILY PRN
Status: DISCONTINUED | OUTPATIENT
Start: 2020-01-01 | End: 2020-01-01 | Stop reason: HOSPADM

## 2020-01-01 RX ORDER — MORPHINE SULFATE 4 MG/ML
4 INJECTION, SOLUTION INTRAMUSCULAR; INTRAVENOUS
Status: DISCONTINUED | OUTPATIENT
Start: 2020-01-01 | End: 2020-01-01

## 2020-01-01 RX ORDER — POTASSIUM CHLORIDE 20 MEQ/1
40 TABLET, EXTENDED RELEASE ORAL AS NEEDED
Status: DISCONTINUED | OUTPATIENT
Start: 2020-01-01 | End: 2020-01-01

## 2020-01-01 RX ORDER — ONDANSETRON 2 MG/ML
4 INJECTION INTRAMUSCULAR; INTRAVENOUS ONCE
Status: COMPLETED | OUTPATIENT
Start: 2020-01-01 | End: 2020-01-01

## 2020-01-01 RX ORDER — ALBUTEROL SULFATE 2.5 MG/3ML
2.5 SOLUTION RESPIRATORY (INHALATION) EVERY 6 HOURS PRN
Status: DISCONTINUED | OUTPATIENT
Start: 2020-01-01 | End: 2020-01-01 | Stop reason: HOSPADM

## 2020-01-01 RX ORDER — NITROGLYCERIN 0.4 MG/1
0.4 TABLET SUBLINGUAL
Status: DISCONTINUED | OUTPATIENT
Start: 2020-01-01 | End: 2020-01-01 | Stop reason: HOSPADM

## 2020-01-01 RX ORDER — LORAZEPAM 2 MG/ML
2 INJECTION INTRAMUSCULAR
Status: DISCONTINUED | OUTPATIENT
Start: 2020-01-01 | End: 2020-01-01 | Stop reason: HOSPADM

## 2020-01-01 RX ORDER — LORAZEPAM 2 MG/ML
1 CONCENTRATE ORAL
Status: DISCONTINUED | OUTPATIENT
Start: 2020-01-01 | End: 2020-01-01 | Stop reason: HOSPADM

## 2020-01-01 RX ORDER — LORAZEPAM 1 MG/1
1 TABLET ORAL EVERY 6 HOURS PRN
Status: DISCONTINUED | OUTPATIENT
Start: 2020-01-01 | End: 2020-01-01 | Stop reason: HOSPADM

## 2020-01-01 RX ORDER — ATORVASTATIN CALCIUM 20 MG/1
20 TABLET, FILM COATED ORAL DAILY
Status: DISCONTINUED | OUTPATIENT
Start: 2020-01-01 | End: 2020-01-01 | Stop reason: HOSPADM

## 2020-01-01 RX ORDER — BUMETANIDE 1 MG/1
1 TABLET ORAL DAILY
Status: DISCONTINUED | OUTPATIENT
Start: 2020-01-01 | End: 2020-01-01

## 2020-01-01 RX ORDER — POTASSIUM CHLORIDE 1.5 G/1.77G
40 POWDER, FOR SOLUTION ORAL AS NEEDED
Status: DISCONTINUED | OUTPATIENT
Start: 2020-01-01 | End: 2020-01-01

## 2020-01-01 RX ORDER — MORPHINE SULFATE 15 MG/1
15 TABLET ORAL
Status: DISCONTINUED | OUTPATIENT
Start: 2020-01-01 | End: 2020-01-01 | Stop reason: HOSPADM

## 2020-01-01 RX ORDER — IPRATROPIUM BROMIDE AND ALBUTEROL SULFATE 2.5; .5 MG/3ML; MG/3ML
3 SOLUTION RESPIRATORY (INHALATION)
Status: DISCONTINUED | OUTPATIENT
Start: 2020-01-01 | End: 2020-01-01 | Stop reason: HOSPADM

## 2020-01-01 RX ORDER — DOCUSATE SODIUM 100 MG/1
100 CAPSULE, LIQUID FILLED ORAL 2 TIMES DAILY
Status: DISCONTINUED | OUTPATIENT
Start: 2020-01-01 | End: 2020-01-01 | Stop reason: HOSPADM

## 2020-01-01 RX ORDER — DEXTROSE MONOHYDRATE 25 G/50ML
25 INJECTION, SOLUTION INTRAVENOUS
Status: DISCONTINUED | OUTPATIENT
Start: 2020-01-01 | End: 2020-01-01 | Stop reason: HOSPADM

## 2020-01-01 RX ORDER — SENNA PLUS 8.6 MG/1
2 TABLET ORAL NIGHTLY
Status: DISCONTINUED | OUTPATIENT
Start: 2020-01-01 | End: 2020-01-01 | Stop reason: HOSPADM

## 2020-01-01 RX ORDER — MORPHINE SULFATE 4 MG/ML
2 INJECTION, SOLUTION INTRAMUSCULAR; INTRAVENOUS
Qty: 5 ML | Refills: 0
Start: 2020-01-01 | End: 2020-01-01

## 2020-01-01 RX ORDER — MORPHINE SULFATE 30 MG/1
30 TABLET ORAL NIGHTLY PRN
COMMUNITY
End: 2020-01-01

## 2020-01-01 RX ORDER — OXYCODONE HYDROCHLORIDE 5 MG/1
5 TABLET ORAL EVERY 8 HOURS PRN
Status: DISPENSED | OUTPATIENT
Start: 2020-01-01 | End: 2020-01-01

## 2020-01-01 RX ORDER — SCOLOPAMINE TRANSDERMAL SYSTEM 1 MG/1
1 PATCH, EXTENDED RELEASE TRANSDERMAL
Status: DISCONTINUED | OUTPATIENT
Start: 2020-01-01 | End: 2020-01-01 | Stop reason: HOSPADM

## 2020-01-01 RX ORDER — DOXYCYCLINE 100 MG/1
100 TABLET ORAL EVERY 12 HOURS SCHEDULED
Status: DISCONTINUED | OUTPATIENT
Start: 2020-01-01 | End: 2020-01-01 | Stop reason: HOSPADM

## 2020-01-01 RX ORDER — SODIUM CHLORIDE 9 MG/ML
50 INJECTION, SOLUTION INTRAVENOUS CONTINUOUS
Status: DISCONTINUED | OUTPATIENT
Start: 2020-01-01 | End: 2020-01-01

## 2020-01-01 RX ORDER — POTASSIUM CHLORIDE 1.5 G/1.77G
20 POWDER, FOR SOLUTION ORAL DAILY
Status: DISCONTINUED | OUTPATIENT
Start: 2020-01-01 | End: 2020-01-01 | Stop reason: HOSPADM

## 2020-01-01 RX ORDER — ASPIRIN 325 MG
325 TABLET ORAL ONCE
Status: COMPLETED | OUTPATIENT
Start: 2020-01-01 | End: 2020-01-01

## 2020-01-01 RX ORDER — ACETAMINOPHEN 325 MG/1
650 TABLET ORAL EVERY 4 HOURS PRN
Status: DISCONTINUED | OUTPATIENT
Start: 2020-01-01 | End: 2020-01-01 | Stop reason: HOSPADM

## 2020-01-01 RX ORDER — CALCIUM CARBONATE 200(500)MG
2 TABLET,CHEWABLE ORAL EVERY 4 HOURS PRN
COMMUNITY
End: 2020-01-01 | Stop reason: HOSPADM

## 2020-01-01 RX ORDER — HYDROMORPHONE HYDROCHLORIDE 1 MG/ML
0.5 INJECTION, SOLUTION INTRAMUSCULAR; INTRAVENOUS; SUBCUTANEOUS
Status: DISCONTINUED | OUTPATIENT
Start: 2020-01-01 | End: 2020-01-01

## 2020-01-01 RX ORDER — BUMETANIDE 0.5 MG/1
0.5 TABLET ORAL DAILY
Qty: 30 TABLET | Refills: 0 | Status: SHIPPED | OUTPATIENT
Start: 2020-01-01 | End: 2020-01-01 | Stop reason: HOSPADM

## 2020-01-01 RX ORDER — BUMETANIDE 1 MG/1
1 TABLET ORAL DAILY
COMMUNITY
End: 2020-01-01 | Stop reason: HOSPADM

## 2020-01-01 RX ORDER — CHOLECALCIFEROL (VITAMIN D3) 125 MCG
5 CAPSULE ORAL NIGHTLY PRN
Status: DISCONTINUED | OUTPATIENT
Start: 2020-01-01 | End: 2020-01-01

## 2020-01-01 RX ORDER — LORAZEPAM 2 MG/ML
1 INJECTION INTRAMUSCULAR
Qty: 6 ML | Refills: 0
Start: 2020-01-01

## 2020-01-01 RX ORDER — SULFAMETHOXAZOLE AND TRIMETHOPRIM 800; 160 MG/1; MG/1
1 TABLET ORAL 2 TIMES DAILY
Qty: 20 TABLET | Refills: 0 | Status: SHIPPED | OUTPATIENT
Start: 2020-01-01 | End: 2020-01-01

## 2020-01-01 RX ORDER — BISMUTH SUBSALICYLATE 262 MG/1
524 TABLET, CHEWABLE ORAL
Status: DISCONTINUED | OUTPATIENT
Start: 2020-01-01 | End: 2020-01-01 | Stop reason: HOSPADM

## 2020-01-01 RX ORDER — ASPIRIN 81 MG/1
324 TABLET, CHEWABLE ORAL ONCE
Status: COMPLETED | OUTPATIENT
Start: 2020-01-01 | End: 2020-01-01

## 2020-01-01 RX ORDER — FUROSEMIDE 10 MG/ML
40 INJECTION INTRAMUSCULAR; INTRAVENOUS ONCE
Status: COMPLETED | OUTPATIENT
Start: 2020-01-01 | End: 2020-01-01

## 2020-01-01 RX ORDER — MORPHINE SULFATE 4 MG/ML
2 INJECTION, SOLUTION INTRAMUSCULAR; INTRAVENOUS EVERY 4 HOURS PRN
Status: DISCONTINUED | OUTPATIENT
Start: 2020-01-01 | End: 2020-01-01 | Stop reason: HOSPADM

## 2020-01-01 RX ORDER — ONDANSETRON 2 MG/ML
4 INJECTION INTRAMUSCULAR; INTRAVENOUS EVERY 6 HOURS PRN
Qty: 6 ML | Refills: 0
Start: 2020-01-01

## 2020-01-01 RX ORDER — MORPHINE SULFATE 30 MG/1
30 TABLET, FILM COATED, EXTENDED RELEASE ORAL EVERY 12 HOURS SCHEDULED
Status: DISCONTINUED | OUTPATIENT
Start: 2020-01-01 | End: 2020-01-01

## 2020-01-01 RX ORDER — NYSTATIN 100000 [USP'U]/G
POWDER TOPICAL 2 TIMES DAILY
COMMUNITY
End: 2020-01-01 | Stop reason: HOSPADM

## 2020-01-01 RX ORDER — PREDNISONE 10 MG/1
20 TABLET ORAL 2 TIMES DAILY
COMMUNITY
End: 2020-01-01 | Stop reason: HOSPADM

## 2020-01-01 RX ORDER — CEFDINIR 300 MG/1
300 CAPSULE ORAL EVERY 12 HOURS SCHEDULED
Qty: 9 CAPSULE | Refills: 0 | Status: SHIPPED | OUTPATIENT
Start: 2020-01-01 | End: 2020-01-01

## 2020-01-01 RX ORDER — AMLODIPINE BESYLATE 5 MG/1
2.5 TABLET ORAL DAILY
Status: DISCONTINUED | OUTPATIENT
Start: 2020-01-01 | End: 2020-01-01 | Stop reason: HOSPADM

## 2020-01-01 RX ORDER — PREDNISONE 20 MG/1
40 TABLET ORAL
Status: DISCONTINUED | OUTPATIENT
Start: 2020-01-01 | End: 2020-01-01 | Stop reason: HOSPADM

## 2020-01-01 RX ORDER — POTASSIUM CHLORIDE 7.45 MG/ML
10 INJECTION INTRAVENOUS
Status: DISCONTINUED | OUTPATIENT
Start: 2020-01-01 | End: 2020-01-01

## 2020-01-01 RX ORDER — CETIRIZINE HYDROCHLORIDE 10 MG/1
10 TABLET ORAL DAILY
Status: DISCONTINUED | OUTPATIENT
Start: 2020-01-01 | End: 2020-01-01 | Stop reason: HOSPADM

## 2020-01-01 RX ORDER — MORPHINE SULFATE 15 MG/1
15 TABLET ORAL EVERY 6 HOURS PRN
Status: DISCONTINUED | OUTPATIENT
Start: 2020-01-01 | End: 2020-01-01 | Stop reason: HOSPADM

## 2020-01-01 RX ORDER — PREDNISONE 20 MG/1
20 TABLET ORAL 2 TIMES DAILY
Status: DISCONTINUED | OUTPATIENT
Start: 2020-01-01 | End: 2020-01-01

## 2020-01-01 RX ORDER — DOXYCYCLINE 100 MG/1
100 TABLET ORAL EVERY 12 HOURS SCHEDULED
Qty: 3 TABLET | Refills: 0 | Status: SHIPPED | OUTPATIENT
Start: 2020-01-01 | End: 2020-01-01

## 2020-01-01 RX ORDER — MORPHINE SULFATE 4 MG/ML
2 INJECTION, SOLUTION INTRAMUSCULAR; INTRAVENOUS EVERY 4 HOURS PRN
Qty: 6 ML | Refills: 0
Start: 2020-01-01 | End: 2020-01-01

## 2020-01-01 RX ORDER — LORAZEPAM 1 MG/1
1 TABLET ORAL EVERY 6 HOURS PRN
COMMUNITY
End: 2020-01-01 | Stop reason: HOSPADM

## 2020-01-01 RX ORDER — DULOXETIN HYDROCHLORIDE 30 MG/1
30 CAPSULE, DELAYED RELEASE ORAL 2 TIMES DAILY
Status: DISCONTINUED | OUTPATIENT
Start: 2020-01-01 | End: 2020-01-01 | Stop reason: HOSPADM

## 2020-01-01 RX ORDER — DOXYCYCLINE HYCLATE 100 MG
100 TABLET ORAL 2 TIMES DAILY
COMMUNITY
Start: 2020-01-01 | End: 2020-01-01 | Stop reason: HOSPADM

## 2020-01-01 RX ORDER — DOCUSATE SODIUM 100 MG/1
100 CAPSULE, LIQUID FILLED ORAL 2 TIMES DAILY
COMMUNITY

## 2020-01-01 RX ORDER — HYDROMORPHONE HCL 110MG/55ML
2 PATIENT CONTROLLED ANALGESIA SYRINGE INTRAVENOUS
Status: DISCONTINUED | OUTPATIENT
Start: 2020-01-01 | End: 2020-01-01 | Stop reason: HOSPADM

## 2020-01-01 RX ORDER — MORPHINE SULFATE 20 MG/ML
5 SOLUTION ORAL
Status: DISCONTINUED | OUTPATIENT
Start: 2020-01-01 | End: 2020-01-01

## 2020-01-01 RX ORDER — IPRATROPIUM BROMIDE AND ALBUTEROL SULFATE 2.5; .5 MG/3ML; MG/3ML
3 SOLUTION RESPIRATORY (INHALATION) ONCE
Status: COMPLETED | OUTPATIENT
Start: 2020-01-01 | End: 2020-01-01

## 2020-01-01 RX ORDER — ACETAMINOPHEN 325 MG/1
650 TABLET ORAL EVERY 4 HOURS PRN
Qty: 1 BOTTLE | Refills: 0 | Status: SHIPPED | OUTPATIENT
Start: 2020-01-01

## 2020-01-01 RX ORDER — MAGNESIUM SULFATE HEPTAHYDRATE 40 MG/ML
2 INJECTION, SOLUTION INTRAVENOUS AS NEEDED
Status: DISCONTINUED | OUTPATIENT
Start: 2020-01-01 | End: 2020-01-01 | Stop reason: HOSPADM

## 2020-01-01 RX ORDER — DULOXETIN HYDROCHLORIDE 30 MG/1
30 CAPSULE, DELAYED RELEASE ORAL DAILY
Status: DISCONTINUED | OUTPATIENT
Start: 2020-01-01 | End: 2020-01-01 | Stop reason: HOSPADM

## 2020-01-01 RX ORDER — NICOTINE 21 MG/24HR
1 PATCH, TRANSDERMAL 24 HOURS TRANSDERMAL DAILY
Status: DISCONTINUED | OUTPATIENT
Start: 2020-01-01 | End: 2020-01-01 | Stop reason: HOSPADM

## 2020-01-01 RX ORDER — HYDROMORPHONE HCL 110MG/55ML
0.5 PATIENT CONTROLLED ANALGESIA SYRINGE INTRAVENOUS ONCE
Status: COMPLETED | OUTPATIENT
Start: 2020-01-01 | End: 2020-01-01

## 2020-01-01 RX ORDER — LORAZEPAM 2 MG/ML
1 INJECTION INTRAMUSCULAR EVERY 4 HOURS PRN
Status: DISCONTINUED | OUTPATIENT
Start: 2020-01-01 | End: 2020-01-01

## 2020-01-01 RX ORDER — MAGNESIUM SULFATE HEPTAHYDRATE 40 MG/ML
2 INJECTION, SOLUTION INTRAVENOUS AS NEEDED
Status: DISCONTINUED | OUTPATIENT
Start: 2020-01-01 | End: 2020-01-01

## 2020-01-01 RX ORDER — MORPHINE SULFATE 20 MG/ML
10 SOLUTION ORAL
Status: DISCONTINUED | OUTPATIENT
Start: 2020-01-01 | End: 2020-01-01

## 2020-01-01 RX ORDER — LORAZEPAM 2 MG/ML
0.5 INJECTION INTRAMUSCULAR
Status: DISCONTINUED | OUTPATIENT
Start: 2020-01-01 | End: 2020-01-01 | Stop reason: HOSPADM

## 2020-01-01 RX ORDER — GUAIFENESIN AND DEXTROMETHORPHAN HYDROBROMIDE 600; 30 MG/1; MG/1
1 TABLET, EXTENDED RELEASE ORAL 2 TIMES DAILY
Status: DISCONTINUED | OUTPATIENT
Start: 2020-01-01 | End: 2020-01-01 | Stop reason: HOSPADM

## 2020-01-01 RX ORDER — HYDROMORPHONE HCL 110MG/55ML
1.5 PATIENT CONTROLLED ANALGESIA SYRINGE INTRAVENOUS
Status: DISCONTINUED | OUTPATIENT
Start: 2020-01-01 | End: 2020-01-01 | Stop reason: HOSPADM

## 2020-01-01 RX ORDER — ALUMINA, MAGNESIA, AND SIMETHICONE 2400; 2400; 240 MG/30ML; MG/30ML; MG/30ML
15 SUSPENSION ORAL EVERY 6 HOURS PRN
Status: DISCONTINUED | OUTPATIENT
Start: 2020-01-01 | End: 2020-01-01

## 2020-01-01 RX ORDER — MORPHINE SULFATE 30 MG/1
30 TABLET, FILM COATED, EXTENDED RELEASE ORAL EVERY 12 HOURS SCHEDULED
Status: DISCONTINUED | OUTPATIENT
Start: 2020-01-01 | End: 2020-01-01 | Stop reason: HOSPADM

## 2020-01-01 RX ORDER — NICOTINE 21 MG/24HR
1 PATCH, TRANSDERMAL 24 HOURS TRANSDERMAL EVERY 24 HOURS
COMMUNITY
End: 2020-01-01 | Stop reason: HOSPADM

## 2020-01-01 RX ORDER — CALCIUM GLUCONATE 20 MG/ML
2 INJECTION, SOLUTION INTRAVENOUS AS NEEDED
Status: DISCONTINUED | OUTPATIENT
Start: 2020-01-01 | End: 2020-01-01 | Stop reason: HOSPADM

## 2020-01-01 RX ORDER — AMLODIPINE BESYLATE 5 MG/1
5 TABLET ORAL DAILY
Status: DISCONTINUED | OUTPATIENT
Start: 2020-01-01 | End: 2020-01-01

## 2020-01-01 RX ORDER — LORAZEPAM 2 MG/ML
1 INJECTION INTRAMUSCULAR
Status: DISCONTINUED | OUTPATIENT
Start: 2020-01-01 | End: 2020-01-01 | Stop reason: HOSPADM

## 2020-01-01 RX ORDER — SODIUM CHLORIDE 0.9 % (FLUSH) 0.9 %
10 SYRINGE (ML) INJECTION EVERY 12 HOURS SCHEDULED
Status: DISCONTINUED | OUTPATIENT
Start: 2020-01-01 | End: 2020-01-01

## 2020-01-01 RX ORDER — ALBUTEROL SULFATE 2.5 MG/3ML
2.5 SOLUTION RESPIRATORY (INHALATION) EVERY 6 HOURS PRN
COMMUNITY

## 2020-01-01 RX ORDER — MORPHINE SULFATE 4 MG/ML
1 INJECTION, SOLUTION INTRAMUSCULAR; INTRAVENOUS
Start: 2020-01-01 | End: 2020-01-01

## 2020-01-01 RX ORDER — BUDESONIDE 0.5 MG/2ML
0.5 INHALANT ORAL
Status: DISCONTINUED | OUTPATIENT
Start: 2020-01-01 | End: 2020-01-01 | Stop reason: HOSPADM

## 2020-01-01 RX ORDER — CALCIUM CARBONATE 200(500)MG
2 TABLET,CHEWABLE ORAL EVERY 4 HOURS PRN
Status: DISCONTINUED | OUTPATIENT
Start: 2020-01-01 | End: 2020-01-01 | Stop reason: HOSPADM

## 2020-01-01 RX ORDER — ALBUTEROL SULFATE 90 UG/1
2 AEROSOL, METERED RESPIRATORY (INHALATION) ONCE
Status: COMPLETED | OUTPATIENT
Start: 2020-01-01 | End: 2020-01-01

## 2020-01-01 RX ORDER — CALCIUM GLUCONATE 20 MG/ML
1 INJECTION, SOLUTION INTRAVENOUS AS NEEDED
Status: DISCONTINUED | OUTPATIENT
Start: 2020-01-01 | End: 2020-01-01 | Stop reason: HOSPADM

## 2020-01-01 RX ORDER — PREDNISONE 20 MG/1
20 TABLET ORAL
Status: DISCONTINUED | OUTPATIENT
Start: 2020-01-01 | End: 2020-01-01

## 2020-01-01 RX ORDER — DIPHENOXYLATE HYDROCHLORIDE AND ATROPINE SULFATE 2.5; .025 MG/1; MG/1
1 TABLET ORAL
Status: DISCONTINUED | OUTPATIENT
Start: 2020-01-01 | End: 2020-01-01 | Stop reason: HOSPADM

## 2020-01-01 RX ORDER — NITROGLYCERIN 0.4 MG/1
0.4 TABLET SUBLINGUAL
COMMUNITY
End: 2020-01-01 | Stop reason: HOSPADM

## 2020-01-01 RX ORDER — METHYLPREDNISOLONE SODIUM SUCCINATE 125 MG/2ML
60 INJECTION, POWDER, LYOPHILIZED, FOR SOLUTION INTRAMUSCULAR; INTRAVENOUS EVERY 8 HOURS
Status: DISCONTINUED | OUTPATIENT
Start: 2020-01-01 | End: 2020-01-01

## 2020-01-01 RX ORDER — ALBUTEROL SULFATE 90 UG/1
2 AEROSOL, METERED RESPIRATORY (INHALATION) EVERY 4 HOURS PRN
COMMUNITY
End: 2020-01-01 | Stop reason: HOSPADM

## 2020-01-01 RX ORDER — IPRATROPIUM BROMIDE AND ALBUTEROL SULFATE 2.5; .5 MG/3ML; MG/3ML
3 SOLUTION RESPIRATORY (INHALATION)
COMMUNITY

## 2020-01-01 RX ORDER — BACITRACIN ZINC 500 [USP'U]/G
OINTMENT TOPICAL 4 TIMES DAILY
COMMUNITY
End: 2020-01-01 | Stop reason: HOSPADM

## 2020-01-01 RX ORDER — NYSTATIN 100000 [USP'U]/G
POWDER TOPICAL 2 TIMES DAILY
Status: DISCONTINUED | OUTPATIENT
Start: 2020-01-01 | End: 2020-01-01 | Stop reason: HOSPADM

## 2020-01-01 RX ORDER — LORAZEPAM 2 MG/ML
2 CONCENTRATE ORAL
Status: DISCONTINUED | OUTPATIENT
Start: 2020-01-01 | End: 2020-01-01 | Stop reason: HOSPADM

## 2020-01-01 RX ORDER — ATORVASTATIN CALCIUM 20 MG/1
20 TABLET, FILM COATED ORAL DAILY
COMMUNITY
End: 2020-01-01

## 2020-01-01 RX ORDER — NICOTINE POLACRILEX 4 MG
15 LOZENGE BUCCAL
Status: DISCONTINUED | OUTPATIENT
Start: 2020-01-01 | End: 2020-01-01 | Stop reason: HOSPADM

## 2020-01-01 RX ORDER — MORPHINE SULFATE 20 MG/ML
20 SOLUTION ORAL
Status: DISCONTINUED | OUTPATIENT
Start: 2020-01-01 | End: 2020-01-01

## 2020-01-01 RX ORDER — DULOXETIN HYDROCHLORIDE 30 MG/1
30 CAPSULE, DELAYED RELEASE ORAL 2 TIMES DAILY
COMMUNITY

## 2020-01-01 RX ORDER — PREDNISONE 20 MG/1
40 TABLET ORAL
Qty: 10 TABLET | Refills: 0 | Status: SHIPPED | OUTPATIENT
Start: 2020-01-01 | End: 2020-01-01

## 2020-01-01 RX ORDER — PROCHLORPERAZINE MALEATE 10 MG
10 TABLET ORAL EVERY 6 HOURS PRN
COMMUNITY

## 2020-01-01 RX ORDER — SENNA PLUS 8.6 MG/1
2 TABLET ORAL NIGHTLY
Status: DISCONTINUED | OUTPATIENT
Start: 2020-01-01 | End: 2020-01-01

## 2020-01-01 RX ORDER — AMLODIPINE BESYLATE 2.5 MG/1
2.5 TABLET ORAL DAILY
Qty: 30 TABLET | Refills: 0 | Status: SHIPPED | OUTPATIENT
Start: 2020-01-01 | End: 2020-01-01 | Stop reason: HOSPADM

## 2020-01-01 RX ORDER — MORPHINE SULFATE 10 MG/ML
6 INJECTION INTRAMUSCULAR; INTRAVENOUS; SUBCUTANEOUS
Status: DISCONTINUED | OUTPATIENT
Start: 2020-01-01 | End: 2020-01-01

## 2020-01-01 RX ORDER — AZITHROMYCIN 250 MG/1
250 TABLET, FILM COATED ORAL
Status: DISCONTINUED | OUTPATIENT
Start: 2020-01-01 | End: 2020-01-01 | Stop reason: SDUPTHER

## 2020-01-01 RX ORDER — LORAZEPAM 1 MG/1
1 TABLET ORAL EVERY 6 HOURS PRN
Status: DISCONTINUED | OUTPATIENT
Start: 2020-01-01 | End: 2020-01-01

## 2020-01-01 RX ADMIN — Medication 10 ML: at 21:41

## 2020-01-01 RX ADMIN — DOXYCYCLINE 100 MG: 100 TABLET, FILM COATED ORAL at 20:11

## 2020-01-01 RX ADMIN — CEFEPIME HYDROCHLORIDE 2 G: 2 INJECTION, POWDER, FOR SOLUTION INTRAVENOUS at 21:54

## 2020-01-01 RX ADMIN — SENNOSIDES 2 TABLET: 8.6 TABLET, FILM COATED ORAL at 21:24

## 2020-01-01 RX ADMIN — CEFEPIME HYDROCHLORIDE 2 G: 2 INJECTION, POWDER, FOR SOLUTION INTRAVENOUS at 17:00

## 2020-01-01 RX ADMIN — HYDROMORPHONE HYDROCHLORIDE 2 MG: 2 INJECTION, SOLUTION INTRAMUSCULAR; INTRAVENOUS; SUBCUTANEOUS at 12:34

## 2020-01-01 RX ADMIN — METHYLPREDNISOLONE SODIUM SUCCINATE 40 MG: 40 INJECTION, POWDER, FOR SOLUTION INTRAMUSCULAR; INTRAVENOUS at 17:28

## 2020-01-01 RX ADMIN — IPRATROPIUM BROMIDE AND ALBUTEROL SULFATE 3 ML: 2.5; .5 SOLUTION RESPIRATORY (INHALATION) at 15:25

## 2020-01-01 RX ADMIN — BUDESONIDE 0.5 MG: 0.5 INHALANT RESPIRATORY (INHALATION) at 20:36

## 2020-01-01 RX ADMIN — GUAIFENESIN AND DEXTROMETHORPHAN HYDROBROMIDE 1 TABLET: 600; 30 TABLET, EXTENDED RELEASE ORAL at 11:19

## 2020-01-01 RX ADMIN — PREDNISONE 20 MG: 20 TABLET ORAL at 17:46

## 2020-01-01 RX ADMIN — BUDESONIDE 0.5 MG: 0.5 INHALANT RESPIRATORY (INHALATION) at 21:40

## 2020-01-01 RX ADMIN — CEFEPIME 2 G: 2 INJECTION, POWDER, FOR SOLUTION INTRAVENOUS at 21:38

## 2020-01-01 RX ADMIN — DULOXETINE HYDROCHLORIDE 30 MG: 30 CAPSULE, DELAYED RELEASE ORAL at 21:24

## 2020-01-01 RX ADMIN — PROCHLORPERAZINE MALEATE 10 MG: 5 TABLET ORAL at 01:25

## 2020-01-01 RX ADMIN — MORPHINE SULFATE 15 MG: 15 TABLET ORAL at 13:13

## 2020-01-01 RX ADMIN — NICOTINE 1 PATCH: 21 PATCH TRANSDERMAL at 08:21

## 2020-01-01 RX ADMIN — CEFEPIME HYDROCHLORIDE 2 G: 2 INJECTION, POWDER, FOR SOLUTION INTRAVENOUS at 22:57

## 2020-01-01 RX ADMIN — HYDROMORPHONE HYDROCHLORIDE 0.5 MG: 2 INJECTION, SOLUTION INTRAMUSCULAR; INTRAVENOUS; SUBCUTANEOUS at 23:55

## 2020-01-01 RX ADMIN — GUAIFENESIN AND DEXTROMETHORPHAN HYDROBROMIDE 1 TABLET: 600; 30 TABLET, EXTENDED RELEASE ORAL at 20:10

## 2020-01-01 RX ADMIN — METHYLPREDNISOLONE SODIUM SUCCINATE 40 MG: 40 INJECTION, POWDER, FOR SOLUTION INTRAMUSCULAR; INTRAVENOUS at 17:21

## 2020-01-01 RX ADMIN — BACITRACIN: 500 OINTMENT TOPICAL at 21:30

## 2020-01-01 RX ADMIN — IPRATROPIUM BROMIDE AND ALBUTEROL SULFATE 3 ML: 2.5; .5 SOLUTION RESPIRATORY (INHALATION) at 10:47

## 2020-01-01 RX ADMIN — IPRATROPIUM BROMIDE AND ALBUTEROL SULFATE 3 ML: 2.5; .5 SOLUTION RESPIRATORY (INHALATION) at 16:03

## 2020-01-01 RX ADMIN — LORAZEPAM 2 MG: 2 INJECTION INTRAMUSCULAR; INTRAVENOUS at 03:23

## 2020-01-01 RX ADMIN — MORPHINE SULFATE 30 MG: 30 TABLET, FILM COATED, EXTENDED RELEASE ORAL at 20:32

## 2020-01-01 RX ADMIN — IPRATROPIUM BROMIDE AND ALBUTEROL SULFATE 3 ML: 2.5; .5 SOLUTION RESPIRATORY (INHALATION) at 20:32

## 2020-01-01 RX ADMIN — MORPHINE SULFATE: 4 INJECTION INTRAVENOUS at 08:21

## 2020-01-01 RX ADMIN — HYDROMORPHONE HYDROCHLORIDE 2 MG: 2 INJECTION, SOLUTION INTRAMUSCULAR; INTRAVENOUS; SUBCUTANEOUS at 12:27

## 2020-01-01 RX ADMIN — POTASSIUM CHLORIDE 40 MEQ: 1500 TABLET, EXTENDED RELEASE ORAL at 00:31

## 2020-01-01 RX ADMIN — HYDROMORPHONE HYDROCHLORIDE 1.5 MG: 2 INJECTION, SOLUTION INTRAMUSCULAR; INTRAVENOUS; SUBCUTANEOUS at 04:55

## 2020-01-01 RX ADMIN — SENNOSIDES 2 TABLET: 8.6 TABLET, FILM COATED ORAL at 20:36

## 2020-01-01 RX ADMIN — Medication 10 ML: at 08:21

## 2020-01-01 RX ADMIN — GLYCOPYRROLATE 0.4 MG: 0.2 INJECTION, SOLUTION INTRAMUSCULAR; INTRAVITREAL at 00:58

## 2020-01-01 RX ADMIN — IPRATROPIUM BROMIDE AND ALBUTEROL SULFATE 3 ML: 2.5; .5 SOLUTION RESPIRATORY (INHALATION) at 11:46

## 2020-01-01 RX ADMIN — HYDROMORPHONE HYDROCHLORIDE 2 MG: 2 INJECTION, SOLUTION INTRAMUSCULAR; INTRAVENOUS; SUBCUTANEOUS at 20:29

## 2020-01-01 RX ADMIN — LORAZEPAM 2 MG: 2 INJECTION INTRAMUSCULAR; INTRAVENOUS at 00:58

## 2020-01-01 RX ADMIN — Medication 10 ML: at 08:59

## 2020-01-01 RX ADMIN — LORAZEPAM 2 MG: 2 INJECTION INTRAMUSCULAR; INTRAVENOUS at 08:47

## 2020-01-01 RX ADMIN — POTASSIUM CHLORIDE 40 MEQ: 1500 TABLET, EXTENDED RELEASE ORAL at 03:27

## 2020-01-01 RX ADMIN — FUROSEMIDE 40 MG: 10 INJECTION, SOLUTION INTRAMUSCULAR; INTRAVENOUS at 21:54

## 2020-01-01 RX ADMIN — CETIRIZINE HYDROCHLORIDE 10 MG: 10 TABLET, FILM COATED ORAL at 08:21

## 2020-01-01 RX ADMIN — NYSTATIN: 100000 POWDER TOPICAL at 20:13

## 2020-01-01 RX ADMIN — ALBUTEROL SULFATE 2 PUFF: 90 AEROSOL, METERED RESPIRATORY (INHALATION) at 20:24

## 2020-01-01 RX ADMIN — GLYCOPYRROLATE 0.4 MG: 0.2 INJECTION, SOLUTION INTRAMUSCULAR; INTRAVITREAL at 00:36

## 2020-01-01 RX ADMIN — DOCUSATE SODIUM 100 MG: 100 CAPSULE, LIQUID FILLED ORAL at 20:11

## 2020-01-01 RX ADMIN — Medication 10 ML: at 08:10

## 2020-01-01 RX ADMIN — MORPHINE SULFATE 15 MG: 15 TABLET ORAL at 12:23

## 2020-01-01 RX ADMIN — LORAZEPAM 2 MG: 2 INJECTION INTRAMUSCULAR; INTRAVENOUS at 00:36

## 2020-01-01 RX ADMIN — ENOXAPARIN SODIUM 40 MG: 40 INJECTION SUBCUTANEOUS at 01:17

## 2020-01-01 RX ADMIN — LORAZEPAM 1 MG: 1 TABLET ORAL at 13:13

## 2020-01-01 RX ADMIN — DULOXETINE HYDROCHLORIDE 30 MG: 30 CAPSULE, DELAYED RELEASE ORAL at 08:34

## 2020-01-01 RX ADMIN — AMLODIPINE BESYLATE 2.5 MG: 5 TABLET ORAL at 08:57

## 2020-01-01 RX ADMIN — LORAZEPAM 1 MG: 2 INJECTION INTRAMUSCULAR; INTRAVENOUS at 11:12

## 2020-01-01 RX ADMIN — HYDROMORPHONE HYDROCHLORIDE 0.5 MG: 1 INJECTION, SOLUTION INTRAMUSCULAR; INTRAVENOUS; SUBCUTANEOUS at 00:49

## 2020-01-01 RX ADMIN — LORAZEPAM 1 MG: 2 INJECTION INTRAMUSCULAR; INTRAVENOUS at 08:02

## 2020-01-01 RX ADMIN — LORAZEPAM 1 MG: 2 INJECTION INTRAMUSCULAR; INTRAVENOUS at 13:06

## 2020-01-01 RX ADMIN — IPRATROPIUM BROMIDE AND ALBUTEROL SULFATE 3 ML: 2.5; .5 SOLUTION RESPIRATORY (INHALATION) at 23:07

## 2020-01-01 RX ADMIN — POTASSIUM CHLORIDE 40 MEQ: 1500 TABLET, EXTENDED RELEASE ORAL at 23:31

## 2020-01-01 RX ADMIN — IPRATROPIUM BROMIDE AND ALBUTEROL SULFATE 3 ML: 2.5; .5 SOLUTION RESPIRATORY (INHALATION) at 11:55

## 2020-01-01 RX ADMIN — BUDESONIDE 0.5 MG: 0.5 INHALANT RESPIRATORY (INHALATION) at 07:33

## 2020-01-01 RX ADMIN — LORAZEPAM 1 MG: 2 INJECTION INTRAMUSCULAR; INTRAVENOUS at 15:39

## 2020-01-01 RX ADMIN — DOXYCYCLINE 100 MG: 100 TABLET, FILM COATED ORAL at 08:21

## 2020-01-01 RX ADMIN — CEFEPIME 2 G: 2 INJECTION, POWDER, FOR SOLUTION INTRAVENOUS at 13:43

## 2020-01-01 RX ADMIN — METHYLPREDNISOLONE SODIUM SUCCINATE 40 MG: 40 INJECTION, POWDER, FOR SOLUTION INTRAMUSCULAR; INTRAVENOUS at 17:30

## 2020-01-01 RX ADMIN — IPRATROPIUM BROMIDE AND ALBUTEROL SULFATE 3 ML: 2.5; .5 SOLUTION RESPIRATORY (INHALATION) at 12:00

## 2020-01-01 RX ADMIN — MORPHINE SULFATE 15 MG: 15 TABLET ORAL at 03:27

## 2020-01-01 RX ADMIN — OXYCODONE HYDROCHLORIDE 5 MG: 5 TABLET ORAL at 00:48

## 2020-01-01 RX ADMIN — INSULIN LISPRO 2 UNITS: 100 INJECTION, SOLUTION INTRAVENOUS; SUBCUTANEOUS at 13:13

## 2020-01-01 RX ADMIN — CETIRIZINE HYDROCHLORIDE 10 MG: 10 TABLET, FILM COATED ORAL at 08:11

## 2020-01-01 RX ADMIN — BACITRACIN: 500 OINTMENT TOPICAL at 05:49

## 2020-01-01 RX ADMIN — VANCOMYCIN HYDROCHLORIDE 1250 MG: 10 INJECTION, POWDER, LYOPHILIZED, FOR SOLUTION INTRAVENOUS at 21:58

## 2020-01-01 RX ADMIN — GLYCOPYRROLATE 0.4 MG: 0.2 INJECTION, SOLUTION INTRAMUSCULAR; INTRAVITREAL at 04:52

## 2020-01-01 RX ADMIN — GLYCOPYRROLATE 0.4 MG: 0.2 INJECTION, SOLUTION INTRAMUSCULAR; INTRAVITREAL at 08:13

## 2020-01-01 RX ADMIN — DOCUSATE SODIUM 100 MG: 100 CAPSULE, LIQUID FILLED ORAL at 09:58

## 2020-01-01 RX ADMIN — POTASSIUM CHLORIDE 20 MEQ: 1.5 FOR SOLUTION ORAL at 08:21

## 2020-01-01 RX ADMIN — LORAZEPAM 1 MG: 1 TABLET ORAL at 15:28

## 2020-01-01 RX ADMIN — ATORVASTATIN CALCIUM 20 MG: 20 TABLET, FILM COATED ORAL at 08:10

## 2020-01-01 RX ADMIN — METHYLPREDNISOLONE SODIUM SUCCINATE 40 MG: 40 INJECTION, POWDER, FOR SOLUTION INTRAMUSCULAR; INTRAVENOUS at 09:08

## 2020-01-01 RX ADMIN — IPRATROPIUM BROMIDE AND ALBUTEROL SULFATE 3 ML: 2.5; .5 SOLUTION RESPIRATORY (INHALATION) at 19:07

## 2020-01-01 RX ADMIN — Medication 10 ML: at 00:48

## 2020-01-01 RX ADMIN — IPRATROPIUM BROMIDE AND ALBUTEROL SULFATE 3 ML: 2.5; .5 SOLUTION RESPIRATORY (INHALATION) at 00:18

## 2020-01-01 RX ADMIN — IPRATROPIUM BROMIDE AND ALBUTEROL SULFATE 3 ML: 2.5; .5 SOLUTION RESPIRATORY (INHALATION) at 15:38

## 2020-01-01 RX ADMIN — STANDARDIZED SENNA CONCENTRATE 1 TABLET: 8.6 TABLET ORAL at 08:59

## 2020-01-01 RX ADMIN — LORAZEPAM 0.5 MG: 2 INJECTION INTRAMUSCULAR; INTRAVENOUS at 00:49

## 2020-01-01 RX ADMIN — NICOTINE 1 PATCH: 21 PATCH TRANSDERMAL at 09:12

## 2020-01-01 RX ADMIN — IPRATROPIUM BROMIDE AND ALBUTEROL SULFATE 3 ML: 2.5; .5 SOLUTION RESPIRATORY (INHALATION) at 07:32

## 2020-01-01 RX ADMIN — MORPHINE SULFATE 30 MG: 30 TABLET, FILM COATED, EXTENDED RELEASE ORAL at 07:50

## 2020-01-01 RX ADMIN — PIPERACILLIN AND TAZOBACTAM 3.38 G: 3; .375 INJECTION, POWDER, FOR SOLUTION INTRAVENOUS at 08:58

## 2020-01-01 RX ADMIN — DULOXETINE HYDROCHLORIDE 30 MG: 30 CAPSULE, DELAYED RELEASE ORAL at 08:26

## 2020-01-01 RX ADMIN — AMLODIPINE BESYLATE 5 MG: 5 TABLET ORAL at 08:26

## 2020-01-01 RX ADMIN — BACITRACIN: 500 OINTMENT TOPICAL at 14:24

## 2020-01-01 RX ADMIN — BUMETANIDE 1 MG: 1 TABLET ORAL at 08:11

## 2020-01-01 RX ADMIN — AMLODIPINE BESYLATE 5 MG: 5 TABLET ORAL at 09:11

## 2020-01-01 RX ADMIN — DULOXETINE HYDROCHLORIDE 30 MG: 30 CAPSULE, DELAYED RELEASE ORAL at 20:10

## 2020-01-01 RX ADMIN — DOCUSATE SODIUM 100 MG: 100 CAPSULE, LIQUID FILLED ORAL at 20:09

## 2020-01-01 RX ADMIN — CEFEPIME 2 G: 2 INJECTION, POWDER, FOR SOLUTION INTRAVENOUS at 13:41

## 2020-01-01 RX ADMIN — POTASSIUM CHLORIDE 20 MEQ: 1.5 POWDER, FOR SOLUTION ORAL at 09:10

## 2020-01-01 RX ADMIN — BUMETANIDE 1 MG: 1 TABLET ORAL at 09:11

## 2020-01-01 RX ADMIN — HYDROMORPHONE HYDROCHLORIDE 0.5 MG: 1 INJECTION, SOLUTION INTRAMUSCULAR; INTRAVENOUS; SUBCUTANEOUS at 08:40

## 2020-01-01 RX ADMIN — Medication 10 ML: at 04:01

## 2020-01-01 RX ADMIN — STANDARDIZED SENNA CONCENTRATE 1 TABLET: 8.6 TABLET ORAL at 09:13

## 2020-01-01 RX ADMIN — BACITRACIN: 500 OINTMENT TOPICAL at 13:37

## 2020-01-01 RX ADMIN — POTASSIUM CHLORIDE 40 MEQ: 1.5 POWDER, FOR SOLUTION ORAL at 09:12

## 2020-01-01 RX ADMIN — HYDROMORPHONE HYDROCHLORIDE 2 MG: 2 INJECTION, SOLUTION INTRAMUSCULAR; INTRAVENOUS; SUBCUTANEOUS at 00:34

## 2020-01-01 RX ADMIN — COLLAGENASE SANTYL: 250 OINTMENT TOPICAL at 07:50

## 2020-01-01 RX ADMIN — IPRATROPIUM BROMIDE AND ALBUTEROL SULFATE 3 ML: 2.5; .5 SOLUTION RESPIRATORY (INHALATION) at 21:37

## 2020-01-01 RX ADMIN — LORAZEPAM 1 MG: 1 TABLET ORAL at 11:23

## 2020-01-01 RX ADMIN — IPRATROPIUM BROMIDE AND ALBUTEROL SULFATE 3 ML: 2.5; .5 SOLUTION RESPIRATORY (INHALATION) at 14:26

## 2020-01-01 RX ADMIN — IPRATROPIUM BROMIDE AND ALBUTEROL SULFATE 3 ML: 2.5; .5 SOLUTION RESPIRATORY (INHALATION) at 07:33

## 2020-01-01 RX ADMIN — NITROGLYCERIN 1 INCH: 20 OINTMENT TOPICAL at 00:35

## 2020-01-01 RX ADMIN — GUAIFENESIN AND DEXTROMETHORPHAN HYDROBROMIDE 1 TABLET: 600; 30 TABLET, EXTENDED RELEASE ORAL at 20:11

## 2020-01-01 RX ADMIN — LORAZEPAM 1 MG: 1 TABLET ORAL at 03:27

## 2020-01-01 RX ADMIN — MORPHINE SULFATE 30 MG: 30 TABLET, FILM COATED, EXTENDED RELEASE ORAL at 21:17

## 2020-01-01 RX ADMIN — Medication 10 ML: at 08:35

## 2020-01-01 RX ADMIN — LORAZEPAM 2 MG: 2 INJECTION INTRAMUSCULAR; INTRAVENOUS at 00:22

## 2020-01-01 RX ADMIN — IPRATROPIUM BROMIDE AND ALBUTEROL SULFATE 3 ML: 2.5; .5 SOLUTION RESPIRATORY (INHALATION) at 12:29

## 2020-01-01 RX ADMIN — HYDROMORPHONE HYDROCHLORIDE 0.5 MG: 1 INJECTION, SOLUTION INTRAMUSCULAR; INTRAVENOUS; SUBCUTANEOUS at 22:39

## 2020-01-01 RX ADMIN — LORAZEPAM 1 MG: 1 TABLET ORAL at 16:06

## 2020-01-01 RX ADMIN — ONDANSETRON 4 MG: 2 INJECTION, SOLUTION INTRAMUSCULAR; INTRAVENOUS at 23:54

## 2020-01-01 RX ADMIN — BUMETANIDE 0.5 MG: 1 TABLET ORAL at 08:57

## 2020-01-01 RX ADMIN — MORPHINE SULFATE 30 MG: 30 TABLET, FILM COATED, EXTENDED RELEASE ORAL at 20:10

## 2020-01-01 RX ADMIN — LORAZEPAM 1 MG: 2 INJECTION INTRAMUSCULAR; INTRAVENOUS at 08:41

## 2020-01-01 RX ADMIN — IPRATROPIUM BROMIDE AND ALBUTEROL SULFATE 3 ML: 2.5; .5 SOLUTION RESPIRATORY (INHALATION) at 06:32

## 2020-01-01 RX ADMIN — Medication 10 ML: at 20:11

## 2020-01-01 RX ADMIN — MORPHINE SULFATE 15 MG: 15 TABLET ORAL at 05:21

## 2020-01-01 RX ADMIN — POTASSIUM CHLORIDE 40 MEQ: 1.5 POWDER, FOR SOLUTION ORAL at 08:11

## 2020-01-01 RX ADMIN — ASPIRIN 325 MG ORAL TABLET 325 MG: 325 PILL ORAL at 00:34

## 2020-01-01 RX ADMIN — NICOTINE 1 PATCH: 21 PATCH TRANSDERMAL at 09:09

## 2020-01-01 RX ADMIN — LORAZEPAM 2 MG: 2 INJECTION INTRAMUSCULAR; INTRAVENOUS at 00:30

## 2020-01-01 RX ADMIN — IPRATROPIUM BROMIDE AND ALBUTEROL SULFATE 3 ML: 2.5; .5 SOLUTION RESPIRATORY (INHALATION) at 18:45

## 2020-01-01 RX ADMIN — LORAZEPAM 1 MG: 1 TABLET ORAL at 17:10

## 2020-01-01 RX ADMIN — Medication 10 ML: at 08:03

## 2020-01-01 RX ADMIN — HYDROMORPHONE HYDROCHLORIDE 1 MG: 1 INJECTION, SOLUTION INTRAMUSCULAR; INTRAVENOUS; SUBCUTANEOUS at 20:36

## 2020-01-01 RX ADMIN — MORPHINE SULFATE 15 MG: 15 TABLET ORAL at 14:29

## 2020-01-01 RX ADMIN — Medication 10 ML: at 08:26

## 2020-01-01 RX ADMIN — MORPHINE SULFATE 30 MG: 30 TABLET, FILM COATED, EXTENDED RELEASE ORAL at 20:09

## 2020-01-01 RX ADMIN — DOCUSATE SODIUM 100 MG: 100 CAPSULE, LIQUID FILLED ORAL at 08:26

## 2020-01-01 RX ADMIN — LORAZEPAM 2 MG: 2 INJECTION INTRAMUSCULAR; INTRAVENOUS at 20:56

## 2020-01-01 RX ADMIN — IPRATROPIUM BROMIDE AND ALBUTEROL SULFATE 3 ML: 2.5; .5 SOLUTION RESPIRATORY (INHALATION) at 16:34

## 2020-01-01 RX ADMIN — CEFEPIME 2 G: 2 INJECTION, POWDER, FOR SOLUTION INTRAVENOUS at 06:00

## 2020-01-01 RX ADMIN — GLYCOPYRROLATE 0.4 MG: 0.2 INJECTION, SOLUTION INTRAMUSCULAR; INTRAVITREAL at 21:00

## 2020-01-01 RX ADMIN — POTASSIUM CHLORIDE 40 MEQ: 1500 TABLET, EXTENDED RELEASE ORAL at 16:34

## 2020-01-01 RX ADMIN — STANDARDIZED SENNA CONCENTRATE 1 TABLET: 8.6 TABLET ORAL at 21:17

## 2020-01-01 RX ADMIN — BACITRACIN: 500 OINTMENT TOPICAL at 13:46

## 2020-01-01 RX ADMIN — IPRATROPIUM BROMIDE AND ALBUTEROL SULFATE 3 ML: 2.5; .5 SOLUTION RESPIRATORY (INHALATION) at 17:55

## 2020-01-01 RX ADMIN — VANCOMYCIN HYDROCHLORIDE 1500 MG: 10 INJECTION, POWDER, LYOPHILIZED, FOR SOLUTION INTRAVENOUS at 23:29

## 2020-01-01 RX ADMIN — POTASSIUM CHLORIDE 40 MEQ: 1500 TABLET, EXTENDED RELEASE ORAL at 09:24

## 2020-01-01 RX ADMIN — IPRATROPIUM BROMIDE AND ALBUTEROL SULFATE 3 ML: 2.5; .5 SOLUTION RESPIRATORY (INHALATION) at 03:42

## 2020-01-01 RX ADMIN — DULOXETINE HYDROCHLORIDE 30 MG: 30 CAPSULE, DELAYED RELEASE ORAL at 09:13

## 2020-01-01 RX ADMIN — DOCUSATE SODIUM 100 MG: 100 CAPSULE, LIQUID FILLED ORAL at 08:21

## 2020-01-01 RX ADMIN — BACITRACIN: 500 OINTMENT TOPICAL at 06:09

## 2020-01-01 RX ADMIN — LORAZEPAM 1 MG: 1 TABLET ORAL at 12:59

## 2020-01-01 RX ADMIN — IPRATROPIUM BROMIDE AND ALBUTEROL SULFATE 3 ML: 2.5; .5 SOLUTION RESPIRATORY (INHALATION) at 07:44

## 2020-01-01 RX ADMIN — ENOXAPARIN SODIUM 40 MG: 100 INJECTION SUBCUTANEOUS at 16:32

## 2020-01-01 RX ADMIN — HYDROMORPHONE HYDROCHLORIDE 2 MG: 2 INJECTION, SOLUTION INTRAMUSCULAR; INTRAVENOUS; SUBCUTANEOUS at 12:32

## 2020-01-01 RX ADMIN — LORAZEPAM 1 MG: 2 INJECTION INTRAMUSCULAR; INTRAVENOUS at 08:21

## 2020-01-01 RX ADMIN — MORPHINE SULFATE 30 MG: 30 TABLET, FILM COATED, EXTENDED RELEASE ORAL at 09:19

## 2020-01-01 RX ADMIN — LORAZEPAM 2 MG: 2 INJECTION INTRAMUSCULAR; INTRAVENOUS at 20:47

## 2020-01-01 RX ADMIN — NYSTATIN: 100000 POWDER TOPICAL at 10:08

## 2020-01-01 RX ADMIN — ALBUTEROL SULFATE 2.5 MG: 2.5 SOLUTION RESPIRATORY (INHALATION) at 04:27

## 2020-01-01 RX ADMIN — PIPERACILLIN SODIUM,TAZOBACTAM SODIUM 3.38 G: 3; .375 INJECTION, POWDER, FOR SOLUTION INTRAVENOUS at 13:39

## 2020-01-01 RX ADMIN — DOXYCYCLINE 100 MG: 100 TABLET, FILM COATED ORAL at 09:58

## 2020-01-01 RX ADMIN — HYDROMORPHONE HYDROCHLORIDE 2 MG: 2 INJECTION, SOLUTION INTRAMUSCULAR; INTRAVENOUS; SUBCUTANEOUS at 04:21

## 2020-01-01 RX ADMIN — MORPHINE SULFATE 2 MG: 4 INJECTION INTRAVENOUS at 06:11

## 2020-01-01 RX ADMIN — IPRATROPIUM BROMIDE AND ALBUTEROL SULFATE 3 ML: 2.5; .5 SOLUTION RESPIRATORY (INHALATION) at 16:37

## 2020-01-01 RX ADMIN — CEFEPIME HYDROCHLORIDE 2 G: 2 INJECTION, POWDER, FOR SOLUTION INTRAVENOUS at 23:02

## 2020-01-01 RX ADMIN — SODIUM CHLORIDE 500 ML: 900 INJECTION, SOLUTION INTRAVENOUS at 16:50

## 2020-01-01 RX ADMIN — MORPHINE SULFATE 30 MG: 30 TABLET, FILM COATED, EXTENDED RELEASE ORAL at 09:08

## 2020-01-01 RX ADMIN — MORPHINE SULFATE 30 MG: 30 TABLET, FILM COATED, EXTENDED RELEASE ORAL at 08:21

## 2020-01-01 RX ADMIN — CEFDINIR 300 MG: 300 CAPSULE ORAL at 20:10

## 2020-01-01 RX ADMIN — IPRATROPIUM BROMIDE AND ALBUTEROL SULFATE 3 ML: 2.5; .5 SOLUTION RESPIRATORY (INHALATION) at 20:36

## 2020-01-01 RX ADMIN — MORPHINE SULFATE 15 MG: 15 TABLET ORAL at 02:30

## 2020-01-01 RX ADMIN — GLYCOPYRROLATE 0.4 MG: 0.2 INJECTION, SOLUTION INTRAMUSCULAR; INTRAVITREAL at 04:46

## 2020-01-01 RX ADMIN — MORPHINE SULFATE 30 MG: 30 TABLET, FILM COATED, EXTENDED RELEASE ORAL at 08:34

## 2020-01-01 RX ADMIN — MORPHINE SULFATE 30 MG: 30 TABLET, FILM COATED, EXTENDED RELEASE ORAL at 08:30

## 2020-01-01 RX ADMIN — GLYCOPYRROLATE 0.4 MG: 0.2 INJECTION, SOLUTION INTRAMUSCULAR; INTRAVITREAL at 16:28

## 2020-01-01 RX ADMIN — IPRATROPIUM BROMIDE AND ALBUTEROL SULFATE 3 ML: 2.5; .5 SOLUTION RESPIRATORY (INHALATION) at 19:03

## 2020-01-01 RX ADMIN — HYDROMORPHONE HYDROCHLORIDE 0.5 MG: 1 INJECTION, SOLUTION INTRAMUSCULAR; INTRAVENOUS; SUBCUTANEOUS at 19:48

## 2020-01-01 RX ADMIN — LORAZEPAM 2 MG: 2 INJECTION INTRAMUSCULAR; INTRAVENOUS at 04:21

## 2020-01-01 RX ADMIN — SODIUM CHLORIDE 125 ML/HR: 900 INJECTION, SOLUTION INTRAVENOUS at 12:55

## 2020-01-01 RX ADMIN — DULOXETINE HYDROCHLORIDE 30 MG: 30 CAPSULE, DELAYED RELEASE ORAL at 00:32

## 2020-01-01 RX ADMIN — BACITRACIN: 500 OINTMENT TOPICAL at 14:44

## 2020-01-01 RX ADMIN — METHYLPREDNISOLONE SODIUM SUCCINATE 40 MG: 40 INJECTION, POWDER, FOR SOLUTION INTRAMUSCULAR; INTRAVENOUS at 11:20

## 2020-01-01 RX ADMIN — CETIRIZINE HYDROCHLORIDE 10 MG: 10 TABLET, FILM COATED ORAL at 09:13

## 2020-01-01 RX ADMIN — IPRATROPIUM BROMIDE AND ALBUTEROL SULFATE 3 ML: 2.5; .5 SOLUTION RESPIRATORY (INHALATION) at 08:12

## 2020-01-01 RX ADMIN — POTASSIUM CHLORIDE 20 MEQ: 1.5 FOR SOLUTION ORAL at 09:08

## 2020-01-01 RX ADMIN — IPRATROPIUM BROMIDE AND ALBUTEROL SULFATE 3 ML: 2.5; .5 SOLUTION RESPIRATORY (INHALATION) at 07:03

## 2020-01-01 RX ADMIN — METHYLPREDNISOLONE SODIUM SUCCINATE 40 MG: 40 INJECTION, POWDER, FOR SOLUTION INTRAMUSCULAR; INTRAVENOUS at 17:00

## 2020-01-01 RX ADMIN — HYDROMORPHONE HYDROCHLORIDE 2 MG: 2 INJECTION, SOLUTION INTRAMUSCULAR; INTRAVENOUS; SUBCUTANEOUS at 20:56

## 2020-01-01 RX ADMIN — GUAIFENESIN AND DEXTROMETHORPHAN HYDROBROMIDE 1 TABLET: 600; 30 TABLET, EXTENDED RELEASE ORAL at 09:08

## 2020-01-01 RX ADMIN — GLYCOPYRROLATE 0.4 MG: 0.2 INJECTION, SOLUTION INTRAMUSCULAR; INTRAVITREAL at 20:35

## 2020-01-01 RX ADMIN — MORPHINE SULFATE 30 MG: 30 TABLET, FILM COATED, EXTENDED RELEASE ORAL at 08:57

## 2020-01-01 RX ADMIN — IPRATROPIUM BROMIDE AND ALBUTEROL SULFATE 3 ML: 2.5; .5 SOLUTION RESPIRATORY (INHALATION) at 06:45

## 2020-01-01 RX ADMIN — SENNOSIDES 2 TABLET: 8.6 TABLET, FILM COATED ORAL at 20:09

## 2020-01-01 RX ADMIN — DOXYCYCLINE 100 MG: 100 TABLET, FILM COATED ORAL at 11:19

## 2020-01-01 RX ADMIN — IPRATROPIUM BROMIDE AND ALBUTEROL SULFATE 3 ML: 2.5; .5 SOLUTION RESPIRATORY (INHALATION) at 15:04

## 2020-01-01 RX ADMIN — HYDROMORPHONE HYDROCHLORIDE 2 MG: 2 INJECTION, SOLUTION INTRAMUSCULAR; INTRAVENOUS; SUBCUTANEOUS at 00:21

## 2020-01-01 RX ADMIN — DOXYCYCLINE 100 MG: 100 TABLET, FILM COATED ORAL at 20:35

## 2020-01-01 RX ADMIN — LORAZEPAM 2 MG: 2 INJECTION INTRAMUSCULAR; INTRAVENOUS at 12:34

## 2020-01-01 RX ADMIN — HYDROMORPHONE HYDROCHLORIDE 1 MG: 1 INJECTION, SOLUTION INTRAMUSCULAR; INTRAVENOUS; SUBCUTANEOUS at 17:06

## 2020-01-01 RX ADMIN — ENOXAPARIN SODIUM 40 MG: 40 INJECTION SUBCUTANEOUS at 16:47

## 2020-01-01 RX ADMIN — LORAZEPAM 1 MG: 2 INJECTION INTRAMUSCULAR; INTRAVENOUS at 04:47

## 2020-01-01 RX ADMIN — MORPHINE SULFATE 15 MG: 15 TABLET ORAL at 12:05

## 2020-01-01 RX ADMIN — IPRATROPIUM BROMIDE AND ALBUTEROL SULFATE 3 ML: 2.5; .5 SOLUTION RESPIRATORY (INHALATION) at 02:50

## 2020-01-01 RX ADMIN — IPRATROPIUM BROMIDE AND ALBUTEROL SULFATE 3 ML: 2.5; .5 SOLUTION RESPIRATORY (INHALATION) at 10:52

## 2020-01-01 RX ADMIN — CETIRIZINE HYDROCHLORIDE 10 MG: 10 TABLET, FILM COATED ORAL at 09:58

## 2020-01-01 RX ADMIN — LORAZEPAM 2 MG: 2 INJECTION INTRAMUSCULAR; INTRAVENOUS at 16:29

## 2020-01-01 RX ADMIN — CEFEPIME HYDROCHLORIDE 2 G: 2 INJECTION, POWDER, FOR SOLUTION INTRAVENOUS at 22:37

## 2020-01-01 RX ADMIN — BACITRACIN: 500 OINTMENT TOPICAL at 22:38

## 2020-01-01 RX ADMIN — LORAZEPAM 1 MG: 1 TABLET ORAL at 11:21

## 2020-01-01 RX ADMIN — METHYLPREDNISOLONE SODIUM SUCCINATE 40 MG: 40 INJECTION, POWDER, FOR SOLUTION INTRAMUSCULAR; INTRAVENOUS at 09:59

## 2020-01-01 RX ADMIN — GLYCOPYRROLATE 0.4 MG: 0.2 INJECTION, SOLUTION INTRAMUSCULAR; INTRAVITREAL at 12:27

## 2020-01-01 RX ADMIN — PIPERACILLIN AND TAZOBACTAM 3.38 G: 3; .375 INJECTION, POWDER, FOR SOLUTION INTRAVENOUS at 18:23

## 2020-01-01 RX ADMIN — MORPHINE SULFATE 2 MG: 4 INJECTION INTRAVENOUS at 00:52

## 2020-01-01 RX ADMIN — LORAZEPAM 2 MG: 2 INJECTION INTRAMUSCULAR; INTRAVENOUS at 00:34

## 2020-01-01 RX ADMIN — STANDARDIZED SENNA CONCENTRATE 1 TABLET: 8.6 TABLET ORAL at 21:37

## 2020-01-01 RX ADMIN — ATORVASTATIN CALCIUM 20 MG: 20 TABLET, FILM COATED ORAL at 09:13

## 2020-01-01 RX ADMIN — ENOXAPARIN SODIUM 40 MG: 40 INJECTION SUBCUTANEOUS at 00:35

## 2020-01-01 RX ADMIN — ENOXAPARIN SODIUM 40 MG: 100 INJECTION SUBCUTANEOUS at 17:07

## 2020-01-01 RX ADMIN — DOCUSATE SODIUM 100 MG: 100 CAPSULE, LIQUID FILLED ORAL at 20:35

## 2020-01-01 RX ADMIN — CETIRIZINE HYDROCHLORIDE 10 MG: 10 TABLET, FILM COATED ORAL at 09:07

## 2020-01-01 RX ADMIN — IPRATROPIUM BROMIDE AND ALBUTEROL SULFATE 3 ML: 2.5; .5 SOLUTION RESPIRATORY (INHALATION) at 11:42

## 2020-01-01 RX ADMIN — POTASSIUM CHLORIDE 40 MEQ: 1500 TABLET, EXTENDED RELEASE ORAL at 23:36

## 2020-01-01 RX ADMIN — OXYCODONE HYDROCHLORIDE 5 MG: 5 TABLET ORAL at 06:00

## 2020-01-01 RX ADMIN — GLYCOPYRROLATE 0.4 MG: 0.2 INJECTION, SOLUTION INTRAMUSCULAR; INTRAVITREAL at 08:40

## 2020-01-01 RX ADMIN — LORAZEPAM 2 MG: 2 INJECTION INTRAMUSCULAR; INTRAVENOUS at 08:11

## 2020-01-01 RX ADMIN — ENOXAPARIN SODIUM 40 MG: 40 INJECTION SUBCUTANEOUS at 15:50

## 2020-01-01 RX ADMIN — GLYCOPYRROLATE 0.8 MG: 0.2 INJECTION, SOLUTION INTRAMUSCULAR; INTRAVITREAL at 16:29

## 2020-01-01 RX ADMIN — LORAZEPAM 2 MG: 2 INJECTION INTRAMUSCULAR; INTRAVENOUS at 12:31

## 2020-01-01 RX ADMIN — Medication 10 ML: at 09:25

## 2020-01-01 RX ADMIN — LORAZEPAM 1 MG: 1 TABLET ORAL at 20:37

## 2020-01-01 RX ADMIN — DULOXETINE HYDROCHLORIDE 30 MG: 30 CAPSULE, DELAYED RELEASE ORAL at 09:59

## 2020-01-01 RX ADMIN — IPRATROPIUM BROMIDE AND ALBUTEROL SULFATE 3 ML: 2.5; .5 SOLUTION RESPIRATORY (INHALATION) at 21:59

## 2020-01-01 RX ADMIN — BUDESONIDE 0.5 MG: 0.5 INHALANT RESPIRATORY (INHALATION) at 07:55

## 2020-01-01 RX ADMIN — HYDROMORPHONE HYDROCHLORIDE 2 MG: 2 INJECTION, SOLUTION INTRAMUSCULAR; INTRAVENOUS; SUBCUTANEOUS at 04:57

## 2020-01-01 RX ADMIN — LORAZEPAM 2 MG: 2 INJECTION INTRAMUSCULAR; INTRAVENOUS at 04:57

## 2020-01-01 RX ADMIN — DULOXETINE HYDROCHLORIDE 30 MG: 30 CAPSULE, DELAYED RELEASE ORAL at 09:07

## 2020-01-01 RX ADMIN — PREDNISONE 20 MG: 20 TABLET ORAL at 08:22

## 2020-01-01 RX ADMIN — DULOXETINE HYDROCHLORIDE 30 MG: 30 CAPSULE, DELAYED RELEASE ORAL at 08:11

## 2020-01-01 RX ADMIN — ATORVASTATIN CALCIUM 20 MG: 20 TABLET, FILM COATED ORAL at 08:57

## 2020-01-01 RX ADMIN — DULOXETINE HYDROCHLORIDE 30 MG: 30 CAPSULE, DELAYED RELEASE ORAL at 08:21

## 2020-01-01 RX ADMIN — GUAIFENESIN AND DEXTROMETHORPHAN HYDROBROMIDE 1 TABLET: 600; 30 TABLET, EXTENDED RELEASE ORAL at 08:21

## 2020-01-01 RX ADMIN — ASPIRIN 81 MG 324 MG: 81 TABLET ORAL at 01:52

## 2020-01-01 RX ADMIN — MORPHINE SULFATE 15 MG: 15 TABLET ORAL at 17:31

## 2020-01-01 RX ADMIN — NICOTINE 1 PATCH: 21 PATCH TRANSDERMAL at 09:25

## 2020-01-01 RX ADMIN — PREDNISONE 40 MG: 20 TABLET ORAL at 09:11

## 2020-01-01 RX ADMIN — CEFEPIME 2 G: 2 INJECTION, POWDER, FOR SOLUTION INTRAVENOUS at 05:37

## 2020-01-01 RX ADMIN — HYDROMORPHONE HYDROCHLORIDE 1.5 MG: 2 INJECTION, SOLUTION INTRAMUSCULAR; INTRAVENOUS; SUBCUTANEOUS at 16:28

## 2020-01-01 RX ADMIN — BUMETANIDE 1 MG: 1 TABLET ORAL at 09:13

## 2020-01-01 RX ADMIN — NYSTATIN: 100000 POWDER TOPICAL at 20:11

## 2020-01-01 RX ADMIN — DOXYCYCLINE 100 MG: 100 TABLET, FILM COATED ORAL at 20:10

## 2020-01-01 RX ADMIN — IPRATROPIUM BROMIDE AND ALBUTEROL SULFATE 3 ML: 2.5; .5 SOLUTION RESPIRATORY (INHALATION) at 08:06

## 2020-01-01 RX ADMIN — CEFEPIME HYDROCHLORIDE 2 G: 2 INJECTION, POWDER, FOR SOLUTION INTRAVENOUS at 12:23

## 2020-01-01 RX ADMIN — DOXYCYCLINE 100 MG: 100 TABLET, FILM COATED ORAL at 09:08

## 2020-01-01 RX ADMIN — NYSTATIN: 100000 POWDER TOPICAL at 08:28

## 2020-01-01 RX ADMIN — GLYCOPYRROLATE 0.4 MG: 0.2 INJECTION, SOLUTION INTRAMUSCULAR; INTRAVITREAL at 17:06

## 2020-01-01 RX ADMIN — MAGNESIUM SULFATE HEPTAHYDRATE 4 G: 40 INJECTION, SOLUTION INTRAVENOUS at 18:02

## 2020-01-01 RX ADMIN — MORPHINE SULFATE 30 MG: 30 TABLET, FILM COATED, EXTENDED RELEASE ORAL at 21:37

## 2020-01-01 RX ADMIN — IPRATROPIUM BROMIDE AND ALBUTEROL SULFATE 3 ML: 2.5; .5 SOLUTION RESPIRATORY (INHALATION) at 18:57

## 2020-01-01 RX ADMIN — SODIUM CHLORIDE 500 ML: 0.9 INJECTION, SOLUTION INTRAVENOUS at 04:55

## 2020-01-01 RX ADMIN — LORAZEPAM 1 MG: 1 TABLET ORAL at 03:53

## 2020-01-01 RX ADMIN — IPRATROPIUM BROMIDE AND ALBUTEROL SULFATE 3 ML: 2.5; .5 SOLUTION RESPIRATORY (INHALATION) at 21:41

## 2020-01-01 RX ADMIN — LORAZEPAM 2 MG: 2 INJECTION INTRAMUSCULAR; INTRAVENOUS at 04:30

## 2020-01-01 RX ADMIN — LORAZEPAM 2 MG: 2 INJECTION INTRAMUSCULAR; INTRAVENOUS at 20:29

## 2020-01-01 RX ADMIN — GLYCOPYRROLATE 0.4 MG: 0.2 INJECTION, SOLUTION INTRAMUSCULAR; INTRAVITREAL at 12:32

## 2020-01-01 RX ADMIN — POTASSIUM & SODIUM PHOSPHATES POWDER PACK 280-160-250 MG 2 PACKET: 280-160-250 PACK at 23:46

## 2020-01-01 RX ADMIN — DOCUSATE SODIUM 100 MG: 100 CAPSULE, LIQUID FILLED ORAL at 00:31

## 2020-01-01 RX ADMIN — IPRATROPIUM BROMIDE AND ALBUTEROL SULFATE 3 ML: 2.5; .5 SOLUTION RESPIRATORY (INHALATION) at 11:47

## 2020-01-01 RX ADMIN — SODIUM CHLORIDE 500 ML: 900 INJECTION, SOLUTION INTRAVENOUS at 01:19

## 2020-01-01 RX ADMIN — SODIUM CHLORIDE 750 MG: 900 INJECTION, SOLUTION INTRAVENOUS at 21:27

## 2020-01-01 RX ADMIN — CEFDINIR 300 MG: 300 CAPSULE ORAL at 13:13

## 2020-01-01 RX ADMIN — LORAZEPAM 1 MG: 1 TABLET ORAL at 14:29

## 2020-01-01 RX ADMIN — GLYCOPYRROLATE 0.4 MG: 0.2 INJECTION, SOLUTION INTRAMUSCULAR; INTRAVITREAL at 04:57

## 2020-01-01 RX ADMIN — DULOXETINE HYDROCHLORIDE 30 MG: 30 CAPSULE, DELAYED RELEASE ORAL at 09:11

## 2020-01-01 RX ADMIN — Medication 10 ML: at 20:09

## 2020-01-01 RX ADMIN — DOCUSATE SODIUM 100 MG: 100 CAPSULE, LIQUID FILLED ORAL at 19:50

## 2020-01-01 RX ADMIN — DULOXETINE HYDROCHLORIDE 30 MG: 30 CAPSULE, DELAYED RELEASE ORAL at 20:36

## 2020-01-01 RX ADMIN — CEFEPIME HYDROCHLORIDE 2 G: 2 INJECTION, POWDER, FOR SOLUTION INTRAVENOUS at 23:53

## 2020-01-01 RX ADMIN — HYDROMORPHONE HYDROCHLORIDE 1.5 MG: 2 INJECTION, SOLUTION INTRAMUSCULAR; INTRAVENOUS; SUBCUTANEOUS at 00:30

## 2020-01-01 RX ADMIN — IPRATROPIUM BROMIDE AND ALBUTEROL SULFATE 3 ML: 2.5; .5 SOLUTION RESPIRATORY (INHALATION) at 18:19

## 2020-01-01 RX ADMIN — GLYCOPYRROLATE 0.4 MG: 0.2 INJECTION, SOLUTION INTRAMUSCULAR; INTRAVITREAL at 04:55

## 2020-01-01 RX ADMIN — ALBUTEROL SULFATE 2.5 MG: 2.5 SOLUTION RESPIRATORY (INHALATION) at 04:00

## 2020-01-01 RX ADMIN — IPRATROPIUM BROMIDE AND ALBUTEROL SULFATE 3 ML: 2.5; .5 SOLUTION RESPIRATORY (INHALATION) at 15:52

## 2020-01-01 RX ADMIN — DOCUSATE SODIUM 100 MG: 100 CAPSULE, LIQUID FILLED ORAL at 09:08

## 2020-01-01 RX ADMIN — IPRATROPIUM BROMIDE AND ALBUTEROL SULFATE 3 ML: 2.5; .5 SOLUTION RESPIRATORY (INHALATION) at 12:17

## 2020-01-01 RX ADMIN — Medication 10 ML: at 09:14

## 2020-01-01 RX ADMIN — HYDROMORPHONE HYDROCHLORIDE 2 MG: 2 INJECTION, SOLUTION INTRAMUSCULAR; INTRAVENOUS; SUBCUTANEOUS at 20:47

## 2020-01-01 RX ADMIN — BUDESONIDE 0.5 MG: 0.5 INHALANT RESPIRATORY (INHALATION) at 07:03

## 2020-01-01 RX ADMIN — CEFEPIME HYDROCHLORIDE 2 G: 2 INJECTION, POWDER, FOR SOLUTION INTRAVENOUS at 11:14

## 2020-01-01 RX ADMIN — ALBUTEROL SULFATE 2.5 MG: 2.5 SOLUTION RESPIRATORY (INHALATION) at 21:37

## 2020-01-01 RX ADMIN — ONDANSETRON 4 MG: 2 INJECTION, SOLUTION INTRAMUSCULAR; INTRAVENOUS at 14:53

## 2020-01-01 RX ADMIN — IPRATROPIUM BROMIDE AND ALBUTEROL SULFATE 3 ML: 2.5; .5 SOLUTION RESPIRATORY (INHALATION) at 09:31

## 2020-01-01 RX ADMIN — BUDESONIDE 0.5 MG: 0.5 INHALANT RESPIRATORY (INHALATION) at 07:32

## 2020-01-01 RX ADMIN — GUAIFENESIN AND DEXTROMETHORPHAN HYDROBROMIDE 1 TABLET: 600; 30 TABLET, EXTENDED RELEASE ORAL at 20:35

## 2020-01-01 RX ADMIN — AZITHROMYCIN MONOHYDRATE 500 MG: 250 TABLET ORAL at 09:10

## 2020-01-01 RX ADMIN — GUAIFENESIN AND DEXTROMETHORPHAN HYDROBROMIDE 1 TABLET: 600; 30 TABLET, EXTENDED RELEASE ORAL at 09:58

## 2020-01-01 RX ADMIN — NYSTATIN: 100000 POWDER TOPICAL at 10:37

## 2020-01-01 RX ADMIN — COLLAGENASE SANTYL: 250 OINTMENT TOPICAL at 08:34

## 2020-01-01 RX ADMIN — LORAZEPAM 1 MG: 1 TABLET ORAL at 09:12

## 2020-01-01 RX ADMIN — STANDARDIZED SENNA CONCENTRATE 1 TABLET: 8.6 TABLET ORAL at 09:11

## 2020-01-01 RX ADMIN — HYDROMORPHONE HYDROCHLORIDE 1 MG: 1 INJECTION, SOLUTION INTRAMUSCULAR; INTRAVENOUS; SUBCUTANEOUS at 04:53

## 2020-01-01 RX ADMIN — Medication 10 ML: at 21:18

## 2020-01-01 RX ADMIN — HYDROMORPHONE HYDROCHLORIDE 1.5 MG: 2 INJECTION, SOLUTION INTRAMUSCULAR; INTRAVENOUS; SUBCUTANEOUS at 08:38

## 2020-01-01 RX ADMIN — LORAZEPAM 2 MG: 2 INJECTION INTRAMUSCULAR; INTRAVENOUS at 20:36

## 2020-01-01 RX ADMIN — MORPHINE SULFATE 2 MG: 4 INJECTION INTRAVENOUS at 13:06

## 2020-01-01 RX ADMIN — GLYCOPYRROLATE 0.4 MG: 0.2 INJECTION, SOLUTION INTRAMUSCULAR; INTRAVITREAL at 08:38

## 2020-01-01 RX ADMIN — CETIRIZINE HYDROCHLORIDE 10 MG: 10 TABLET, FILM COATED ORAL at 08:57

## 2020-01-01 RX ADMIN — Medication 10 ML: at 20:39

## 2020-01-01 RX ADMIN — IPRATROPIUM BROMIDE AND ALBUTEROL SULFATE 3 ML: 2.5; .5 SOLUTION RESPIRATORY (INHALATION) at 15:28

## 2020-01-01 RX ADMIN — Medication 10 ML: at 07:50

## 2020-01-01 RX ADMIN — ENOXAPARIN SODIUM 40 MG: 40 INJECTION SUBCUTANEOUS at 18:22

## 2020-01-01 RX ADMIN — CEFEPIME HYDROCHLORIDE 2 G: 2 INJECTION, POWDER, FOR SOLUTION INTRAVENOUS at 11:38

## 2020-01-01 RX ADMIN — POTASSIUM CHLORIDE 20 MEQ: 1.5 POWDER, FOR SOLUTION ORAL at 08:26

## 2020-01-01 RX ADMIN — SODIUM CHLORIDE, PRESERVATIVE FREE 10 ML: 5 INJECTION INTRAVENOUS at 12:34

## 2020-01-01 RX ADMIN — CETIRIZINE HYDROCHLORIDE 10 MG: 10 TABLET, FILM COATED ORAL at 08:22

## 2020-01-01 RX ADMIN — IPRATROPIUM BROMIDE AND ALBUTEROL SULFATE 3 ML: 2.5; .5 SOLUTION RESPIRATORY (INHALATION) at 07:55

## 2020-01-01 RX ADMIN — AZITHROMYCIN MONOHYDRATE 500 MG: 250 TABLET ORAL at 09:12

## 2020-01-01 RX ADMIN — CEFEPIME HYDROCHLORIDE 2 G: 2 INJECTION, POWDER, FOR SOLUTION INTRAVENOUS at 23:28

## 2020-01-01 RX ADMIN — Medication 10 ML: at 21:24

## 2020-01-01 RX ADMIN — LORAZEPAM 1 MG: 1 TABLET ORAL at 01:25

## 2020-01-01 RX ADMIN — MORPHINE SULFATE 15 MG: 15 TABLET ORAL at 16:06

## 2020-01-01 RX ADMIN — POTASSIUM CHLORIDE 20 MEQ: 1.5 POWDER, FOR SOLUTION ORAL at 10:30

## 2020-01-01 RX ADMIN — SENNOSIDES 2 TABLET: 8.6 TABLET, FILM COATED ORAL at 01:17

## 2020-01-01 RX ADMIN — Medication 10 ML: at 20:32

## 2020-01-01 RX ADMIN — COLLAGENASE SANTYL: 250 OINTMENT TOPICAL at 13:04

## 2020-01-01 RX ADMIN — HYDROMORPHONE HYDROCHLORIDE 2 MG: 2 INJECTION, SOLUTION INTRAMUSCULAR; INTRAVENOUS; SUBCUTANEOUS at 16:41

## 2020-01-01 RX ADMIN — MORPHINE SULFATE 30 MG: 30 TABLET, FILM COATED, EXTENDED RELEASE ORAL at 21:24

## 2020-01-01 RX ADMIN — HYDROMORPHONE HYDROCHLORIDE 2 MG: 2 INJECTION, SOLUTION INTRAMUSCULAR; INTRAVENOUS; SUBCUTANEOUS at 16:39

## 2020-01-01 RX ADMIN — METHYLPREDNISOLONE SODIUM SUCCINATE 40 MG: 40 INJECTION, POWDER, FOR SOLUTION INTRAMUSCULAR; INTRAVENOUS at 06:00

## 2020-01-01 RX ADMIN — DOCUSATE SODIUM 100 MG: 100 CAPSULE, LIQUID FILLED ORAL at 09:24

## 2020-01-01 RX ADMIN — HYDROMORPHONE HYDROCHLORIDE 2 MG: 2 INJECTION, SOLUTION INTRAMUSCULAR; INTRAVENOUS; SUBCUTANEOUS at 08:13

## 2020-01-01 RX ADMIN — GLYCOPYRROLATE 0.4 MG: 0.2 INJECTION, SOLUTION INTRAMUSCULAR; INTRAVITREAL at 16:39

## 2020-01-01 RX ADMIN — DOCUSATE SODIUM 100 MG: 100 CAPSULE, LIQUID FILLED ORAL at 21:25

## 2020-01-01 RX ADMIN — PIPERACILLIN AND TAZOBACTAM 3.38 G: 3; .375 INJECTION, POWDER, FOR SOLUTION INTRAVENOUS at 02:41

## 2020-01-01 RX ADMIN — ONDANSETRON HYDROCHLORIDE 4 MG: 4 TABLET, FILM COATED ORAL at 14:49

## 2020-01-01 RX ADMIN — PREDNISONE 40 MG: 20 TABLET ORAL at 09:08

## 2020-01-01 RX ADMIN — ONDANSETRON 4 MG: 2 INJECTION INTRAMUSCULAR; INTRAVENOUS at 19:51

## 2020-01-01 RX ADMIN — HYDROMORPHONE HYDROCHLORIDE 2 MG: 2 INJECTION, SOLUTION INTRAMUSCULAR; INTRAVENOUS; SUBCUTANEOUS at 00:58

## 2020-01-01 RX ADMIN — GLYCOPYRROLATE 0.4 MG: 0.2 INJECTION, SOLUTION INTRAMUSCULAR; INTRAVITREAL at 22:38

## 2020-01-01 RX ADMIN — DULOXETINE HYDROCHLORIDE 30 MG: 30 CAPSULE, DELAYED RELEASE ORAL at 19:50

## 2020-01-01 RX ADMIN — LORAZEPAM 1 MG: 1 TABLET ORAL at 14:49

## 2020-01-01 RX ADMIN — NYSTATIN: 100000 POWDER TOPICAL at 20:37

## 2020-01-01 RX ADMIN — MORPHINE SULFATE 30 MG: 30 TABLET, FILM COATED, EXTENDED RELEASE ORAL at 00:32

## 2020-01-01 RX ADMIN — Medication 10 ML: at 01:18

## 2020-01-01 RX ADMIN — Medication 10 ML: at 21:38

## 2020-01-01 RX ADMIN — HYDROMORPHONE HYDROCHLORIDE 0.5 MG: 1 INJECTION, SOLUTION INTRAMUSCULAR; INTRAVENOUS; SUBCUTANEOUS at 04:47

## 2020-01-01 RX ADMIN — ATORVASTATIN CALCIUM 20 MG: 20 TABLET, FILM COATED ORAL at 09:11

## 2020-01-01 RX ADMIN — NYSTATIN: 100000 POWDER TOPICAL at 09:08

## 2020-01-01 RX ADMIN — IPRATROPIUM BROMIDE AND ALBUTEROL SULFATE 3 ML: 2.5; .5 SOLUTION RESPIRATORY (INHALATION) at 03:43

## 2020-01-01 RX ADMIN — LORAZEPAM 2 MG: 2 INJECTION INTRAMUSCULAR; INTRAVENOUS at 16:39

## 2020-01-01 RX ADMIN — LORAZEPAM 2 MG: 2 INJECTION INTRAMUSCULAR; INTRAVENOUS at 16:41

## 2020-01-01 RX ADMIN — CETIRIZINE HYDROCHLORIDE 10 MG: 10 TABLET, FILM COATED ORAL at 09:24

## 2020-01-01 RX ADMIN — STANDARDIZED SENNA CONCENTRATE 1 TABLET: 8.6 TABLET ORAL at 20:32

## 2020-01-01 RX ADMIN — LORAZEPAM 2 MG: 2 INJECTION INTRAMUSCULAR; INTRAVENOUS at 04:52

## 2020-01-01 RX ADMIN — ONDANSETRON 4 MG: 2 INJECTION INTRAMUSCULAR; INTRAVENOUS at 08:22

## 2020-01-01 RX ADMIN — GLYCOPYRROLATE 0.1 MG: 0.2 INJECTION, SOLUTION INTRAMUSCULAR; INTRAVITREAL at 11:27

## 2020-01-01 RX ADMIN — LORAZEPAM 1 MG: 1 TABLET ORAL at 08:22

## 2020-01-01 RX ADMIN — Medication 10 ML: at 09:20

## 2020-01-01 RX ADMIN — BACITRACIN: 500 OINTMENT TOPICAL at 06:06

## 2020-01-01 RX ADMIN — IPRATROPIUM BROMIDE AND ALBUTEROL SULFATE 3 ML: 2.5; .5 SOLUTION RESPIRATORY (INHALATION) at 18:53

## 2020-01-01 RX ADMIN — AZITHROMYCIN MONOHYDRATE 500 MG: 250 TABLET ORAL at 00:59

## 2020-01-01 RX ADMIN — IPRATROPIUM BROMIDE AND ALBUTEROL SULFATE 3 ML: 2.5; .5 SOLUTION RESPIRATORY (INHALATION) at 12:24

## 2020-01-01 RX ADMIN — LORAZEPAM 2 MG: 2 INJECTION INTRAMUSCULAR; INTRAVENOUS at 12:58

## 2020-01-01 RX ADMIN — NICOTINE 1 PATCH: 21 PATCH TRANSDERMAL at 08:59

## 2020-01-01 RX ADMIN — MORPHINE SULFATE 15 MG: 15 TABLET ORAL at 01:51

## 2020-01-01 RX ADMIN — LORAZEPAM 1 MG: 1 TABLET ORAL at 17:31

## 2020-01-01 RX ADMIN — CETIRIZINE HYDROCHLORIDE 10 MG: 10 TABLET, FILM COATED ORAL at 08:26

## 2020-01-01 RX ADMIN — ENOXAPARIN SODIUM 40 MG: 100 INJECTION SUBCUTANEOUS at 18:13

## 2020-01-01 RX ADMIN — METHYLPREDNISOLONE SODIUM SUCCINATE 125 MG: 125 INJECTION, POWDER, FOR SOLUTION INTRAMUSCULAR; INTRAVENOUS at 21:54

## 2020-01-01 RX ADMIN — LORAZEPAM 1 MG: 1 TABLET ORAL at 22:30

## 2020-01-01 RX ADMIN — POTASSIUM CHLORIDE 40 MEQ: 1500 TABLET, EXTENDED RELEASE ORAL at 13:36

## 2020-01-01 RX ADMIN — MORPHINE SULFATE 30 MG: 30 TABLET, FILM COATED, EXTENDED RELEASE ORAL at 09:13

## 2020-01-01 RX ADMIN — NICOTINE 1 PATCH: 21 PATCH TRANSDERMAL at 03:26

## 2020-01-01 RX ADMIN — POTASSIUM CHLORIDE 20 MEQ: 1.5 FOR SOLUTION ORAL at 08:23

## 2020-01-01 RX ADMIN — NYSTATIN: 100000 POWDER TOPICAL at 08:22

## 2020-01-01 RX ADMIN — CEFTRIAXONE SODIUM 1 G: 1 INJECTION, POWDER, FOR SOLUTION INTRAMUSCULAR; INTRAVENOUS at 10:32

## 2020-01-01 RX ADMIN — MORPHINE SULFATE 30 MG: 30 TABLET, FILM COATED, EXTENDED RELEASE ORAL at 01:18

## 2020-01-01 RX ADMIN — HYDROMORPHONE HYDROCHLORIDE 2 MG: 2 INJECTION, SOLUTION INTRAMUSCULAR; INTRAVENOUS; SUBCUTANEOUS at 16:29

## 2020-01-01 RX ADMIN — IPRATROPIUM BROMIDE AND ALBUTEROL SULFATE 3 ML: 2.5; .5 SOLUTION RESPIRATORY (INHALATION) at 15:11

## 2020-01-01 RX ADMIN — GLYCOPYRROLATE 0.4 MG: 0.2 INJECTION, SOLUTION INTRAMUSCULAR; INTRAVITREAL at 20:56

## 2020-01-01 RX ADMIN — GLYCOPYRROLATE 0.4 MG: 0.2 INJECTION, SOLUTION INTRAMUSCULAR; INTRAVITREAL at 12:47

## 2020-01-01 RX ADMIN — Medication 10 ML: at 12:05

## 2020-01-01 RX ADMIN — HYDROMORPHONE HYDROCHLORIDE 2 MG: 2 INJECTION, SOLUTION INTRAMUSCULAR; INTRAVENOUS; SUBCUTANEOUS at 04:30

## 2020-01-01 RX ADMIN — METHYLPREDNISOLONE SODIUM SUCCINATE 40 MG: 40 INJECTION, POWDER, FOR SOLUTION INTRAMUSCULAR; INTRAVENOUS at 01:51

## 2020-01-01 RX ADMIN — HYDROMORPHONE HYDROCHLORIDE 1 MG: 1 INJECTION, SOLUTION INTRAMUSCULAR; INTRAVENOUS; SUBCUTANEOUS at 00:36

## 2020-01-01 RX ADMIN — IPRATROPIUM BROMIDE AND ALBUTEROL SULFATE 3 ML: 2.5; .5 SOLUTION RESPIRATORY (INHALATION) at 08:07

## 2020-01-01 RX ADMIN — Medication 10 ML: at 20:10

## 2020-01-01 RX ADMIN — AMLODIPINE BESYLATE 5 MG: 5 TABLET ORAL at 08:34

## 2020-01-01 RX ADMIN — LORAZEPAM 2 MG: 2 INJECTION INTRAMUSCULAR; INTRAVENOUS at 04:55

## 2020-01-01 RX ADMIN — LORAZEPAM 2 MG: 2 INJECTION INTRAMUSCULAR; INTRAVENOUS at 20:59

## 2020-01-01 RX ADMIN — NICOTINE 1 PATCH: 21 PATCH TRANSDERMAL at 08:23

## 2020-01-01 RX ADMIN — NICOTINE 1 PATCH: 21 PATCH TRANSDERMAL at 10:08

## 2020-01-01 RX ADMIN — NYSTATIN: 100000 POWDER TOPICAL at 21:25

## 2020-01-01 RX ADMIN — IPRATROPIUM BROMIDE AND ALBUTEROL SULFATE 3 ML: 2.5; .5 SOLUTION RESPIRATORY (INHALATION) at 07:49

## 2020-01-01 RX ADMIN — Medication 10 ML: at 09:07

## 2020-01-01 RX ADMIN — LORAZEPAM 1 MG: 2 INJECTION INTRAMUSCULAR; INTRAVENOUS at 19:51

## 2020-01-01 RX ADMIN — GLYCOPYRROLATE 0.4 MG: 0.2 INJECTION, SOLUTION INTRAMUSCULAR; INTRAVITREAL at 08:47

## 2020-01-01 RX ADMIN — DULOXETINE HYDROCHLORIDE 30 MG: 30 CAPSULE, DELAYED RELEASE ORAL at 09:24

## 2020-01-01 RX ADMIN — METHYLPREDNISOLONE SODIUM SUCCINATE 60 MG: 125 INJECTION, POWDER, FOR SOLUTION INTRAMUSCULAR; INTRAVENOUS at 05:38

## 2020-01-01 RX ADMIN — HYDROMORPHONE HYDROCHLORIDE 0.5 MG: 1 INJECTION, SOLUTION INTRAMUSCULAR; INTRAVENOUS; SUBCUTANEOUS at 12:46

## 2020-01-01 RX ADMIN — MORPHINE SULFATE 15 MG: 15 TABLET ORAL at 15:28

## 2020-01-01 RX ADMIN — LORAZEPAM 2 MG: 2 INJECTION INTRAMUSCULAR; INTRAVENOUS at 08:13

## 2020-01-01 RX ADMIN — HYDROMORPHONE HYDROCHLORIDE 1.5 MG: 2 INJECTION, SOLUTION INTRAMUSCULAR; INTRAVENOUS; SUBCUTANEOUS at 08:47

## 2020-01-01 RX ADMIN — ENOXAPARIN SODIUM 40 MG: 40 INJECTION SUBCUTANEOUS at 15:36

## 2020-01-01 RX ADMIN — MORPHINE SULFATE 2 MG: 4 INJECTION INTRAVENOUS at 08:02

## 2020-01-01 RX ADMIN — GLYCOPYRROLATE 0.4 MG: 0.2 INJECTION, SOLUTION INTRAMUSCULAR; INTRAVITREAL at 12:34

## 2020-01-01 RX ADMIN — CETIRIZINE HYDROCHLORIDE 10 MG: 10 TABLET, FILM COATED ORAL at 09:11

## 2020-01-01 RX ADMIN — MORPHINE SULFATE 2 MG: 4 INJECTION INTRAVENOUS at 19:51

## 2020-01-01 RX ADMIN — ENOXAPARIN SODIUM 40 MG: 40 INJECTION SUBCUTANEOUS at 15:28

## 2020-01-01 RX ADMIN — POTASSIUM CHLORIDE 40 MEQ: 1500 TABLET, EXTENDED RELEASE ORAL at 17:28

## 2020-01-01 RX ADMIN — PREDNISONE 40 MG: 20 TABLET ORAL at 09:13

## 2020-01-01 RX ADMIN — CEFEPIME HYDROCHLORIDE 2 G: 2 INJECTION, POWDER, FOR SOLUTION INTRAVENOUS at 05:58

## 2020-01-01 RX ADMIN — SENNOSIDES 2 TABLET: 8.6 TABLET, FILM COATED ORAL at 20:10

## 2020-01-01 RX ADMIN — LORAZEPAM 1 MG: 2 INJECTION INTRAMUSCULAR; INTRAVENOUS at 12:47

## 2020-01-01 RX ADMIN — ENOXAPARIN SODIUM 40 MG: 100 INJECTION SUBCUTANEOUS at 16:06

## 2020-01-01 RX ADMIN — PREDNISONE 40 MG: 20 TABLET ORAL at 08:49

## 2020-01-01 RX ADMIN — GLYCOPYRROLATE 0.4 MG: 0.2 INJECTION, SOLUTION INTRAMUSCULAR; INTRAVITREAL at 12:58

## 2020-01-01 RX ADMIN — GLYCOPYRROLATE 0.4 MG: 0.2 INJECTION, SOLUTION INTRAMUSCULAR; INTRAVITREAL at 00:30

## 2020-01-01 RX ADMIN — LORAZEPAM 1 MG: 1 TABLET ORAL at 11:53

## 2020-01-01 RX ADMIN — PREDNISONE 40 MG: 20 TABLET ORAL at 08:57

## 2020-01-01 RX ADMIN — Medication 10 ML: at 04:00

## 2020-01-01 RX ADMIN — BACITRACIN: 500 OINTMENT TOPICAL at 05:43

## 2020-01-01 RX ADMIN — ALBUTEROL SULFATE 2.5 MG: 2.5 SOLUTION RESPIRATORY (INHALATION) at 04:07

## 2020-01-01 RX ADMIN — HYDROMORPHONE HYDROCHLORIDE 1.5 MG: 2 INJECTION, SOLUTION INTRAMUSCULAR; INTRAVENOUS; SUBCUTANEOUS at 21:00

## 2020-01-01 RX ADMIN — CEFDINIR 300 MG: 300 CAPSULE ORAL at 09:07

## 2020-01-01 RX ADMIN — HYDROMORPHONE HYDROCHLORIDE 2 MG: 2 INJECTION, SOLUTION INTRAMUSCULAR; INTRAVENOUS; SUBCUTANEOUS at 08:11

## 2020-01-01 RX ADMIN — DULOXETINE HYDROCHLORIDE 30 MG: 30 CAPSULE, DELAYED RELEASE ORAL at 08:57

## 2020-01-01 RX ADMIN — IPRATROPIUM BROMIDE AND ALBUTEROL SULFATE 3 ML: 2.5; .5 SOLUTION RESPIRATORY (INHALATION) at 07:09

## 2020-01-01 RX ADMIN — METHYLPREDNISOLONE SODIUM SUCCINATE 40 MG: 40 INJECTION, POWDER, FOR SOLUTION INTRAMUSCULAR; INTRAVENOUS at 17:07

## 2020-01-01 RX ADMIN — IOPAMIDOL 100 ML: 755 INJECTION, SOLUTION INTRAVENOUS at 03:46

## 2020-01-01 RX ADMIN — HYDROMORPHONE HYDROCHLORIDE 1 MG: 1 INJECTION, SOLUTION INTRAMUSCULAR; INTRAVENOUS; SUBCUTANEOUS at 03:23

## 2020-01-01 RX ADMIN — MORPHINE SULFATE 2 MG: 4 INJECTION INTRAVENOUS at 11:27

## 2020-01-01 RX ADMIN — AMLODIPINE BESYLATE 5 MG: 5 TABLET ORAL at 08:10

## 2020-01-01 RX ADMIN — METHYLPREDNISOLONE SODIUM SUCCINATE 40 MG: 40 INJECTION, POWDER, FOR SOLUTION INTRAMUSCULAR; INTRAVENOUS at 03:09

## 2020-01-01 RX ADMIN — Medication 10 ML: at 09:58

## 2020-01-01 RX ADMIN — LORAZEPAM 1 MG: 2 INJECTION INTRAMUSCULAR; INTRAVENOUS at 19:49

## 2020-01-01 RX ADMIN — LORAZEPAM 1 MG: 1 TABLET ORAL at 07:00

## 2020-01-01 RX ADMIN — BACITRACIN: 500 OINTMENT TOPICAL at 20:37

## 2020-01-01 RX ADMIN — MORPHINE SULFATE 30 MG: 30 TABLET, FILM COATED, EXTENDED RELEASE ORAL at 08:12

## 2020-01-01 RX ADMIN — LORAZEPAM 1 MG: 1 TABLET ORAL at 03:23

## 2020-01-01 RX ADMIN — CEFTRIAXONE SODIUM 1 G: 1 INJECTION, POWDER, FOR SOLUTION INTRAMUSCULAR; INTRAVENOUS at 12:05

## 2020-01-01 RX ADMIN — MORPHINE SULFATE 30 MG: 30 TABLET, FILM COATED, EXTENDED RELEASE ORAL at 09:58

## 2020-01-01 RX ADMIN — MORPHINE SULFATE 1 MG: 4 INJECTION INTRAVENOUS at 15:39

## 2020-01-01 RX ADMIN — POTASSIUM CHLORIDE 20 MEQ: 1.5 POWDER, FOR SOLUTION ORAL at 08:58

## 2020-01-01 RX ADMIN — LORAZEPAM 2 MG: 2 INJECTION INTRAMUSCULAR; INTRAVENOUS at 17:06

## 2020-01-01 RX ADMIN — LORAZEPAM 2 MG: 2 INJECTION INTRAMUSCULAR; INTRAVENOUS at 16:28

## 2020-01-01 RX ADMIN — LORAZEPAM 2 MG: 2 INJECTION INTRAMUSCULAR; INTRAVENOUS at 08:38

## 2020-01-01 RX ADMIN — MORPHINE SULFATE 30 MG: 30 TABLET, FILM COATED, EXTENDED RELEASE ORAL at 20:35

## 2020-01-01 RX ADMIN — IPRATROPIUM BROMIDE AND ALBUTEROL SULFATE 3 ML: 2.5; .5 SOLUTION RESPIRATORY (INHALATION) at 11:54

## 2020-01-01 RX ADMIN — Medication 10 ML: at 19:53

## 2020-01-01 RX ADMIN — BACITRACIN: 500 OINTMENT TOPICAL at 22:42

## 2020-01-01 RX ADMIN — CETIRIZINE HYDROCHLORIDE 10 MG: 10 TABLET, FILM COATED ORAL at 08:34

## 2020-01-01 RX ADMIN — SCOPALAMINE 1 PATCH: 1 PATCH, EXTENDED RELEASE TRANSDERMAL at 22:37

## 2020-01-01 RX ADMIN — MORPHINE SULFATE 30 MG: 30 TABLET, FILM COATED, EXTENDED RELEASE ORAL at 09:24

## 2020-01-01 RX ADMIN — METHYLPREDNISOLONE SODIUM SUCCINATE 125 MG: 125 INJECTION, POWDER, FOR SOLUTION INTRAMUSCULAR; INTRAVENOUS at 23:01

## 2020-01-01 RX ADMIN — HYDROMORPHONE HYDROCHLORIDE 1.5 MG: 2 INJECTION, SOLUTION INTRAMUSCULAR; INTRAVENOUS; SUBCUTANEOUS at 12:58

## 2020-01-01 RX ADMIN — LORAZEPAM 2 MG: 2 INJECTION INTRAMUSCULAR; INTRAVENOUS at 12:27

## 2020-01-01 RX ADMIN — SENNOSIDES 2 TABLET: 8.6 TABLET, FILM COATED ORAL at 00:35

## 2020-01-01 RX ADMIN — AMLODIPINE BESYLATE 5 MG: 5 TABLET ORAL at 09:13

## 2020-01-01 RX ADMIN — METHYLPREDNISOLONE SODIUM SUCCINATE 40 MG: 40 INJECTION, POWDER, FOR SOLUTION INTRAMUSCULAR; INTRAVENOUS at 05:58

## 2020-01-01 RX ADMIN — PREDNISONE 20 MG: 20 TABLET ORAL at 09:24

## 2020-07-14 PROBLEM — E78.5 HYPERLIPIDEMIA: Chronic | Status: ACTIVE | Noted: 2020-01-01

## 2020-07-14 PROBLEM — F41.9 ANXIETY DISORDER: Chronic | Status: ACTIVE | Noted: 2020-01-01

## 2020-07-14 PROBLEM — R07.9 CHEST PAIN: Status: ACTIVE | Noted: 2020-01-01

## 2020-07-14 PROBLEM — F32.A DEPRESSION: Status: ACTIVE | Noted: 2020-01-01

## 2020-07-14 PROBLEM — Z72.0 TOBACCO ABUSE: Status: ACTIVE | Noted: 2020-01-01

## 2020-07-14 PROBLEM — I50.9 CONGESTIVE HEART FAILURE (CHF) (HCC): Chronic | Status: ACTIVE | Noted: 2020-01-01

## 2020-07-14 PROBLEM — J44.1 ACUTE EXACERBATION OF CHRONIC OBSTRUCTIVE PULMONARY DISEASE (COPD) (HCC): Status: ACTIVE | Noted: 2020-01-01

## 2020-07-14 PROBLEM — I10 ESSENTIAL HYPERTENSION: Chronic | Status: ACTIVE | Noted: 2020-01-01

## 2020-07-14 PROBLEM — L89.90 DECUBITUS ULCER: Chronic | Status: ACTIVE | Noted: 2020-01-01

## 2020-07-14 PROBLEM — J30.2 SEASONAL ALLERGIES: Chronic | Status: ACTIVE | Noted: 2020-01-01

## 2020-07-14 NOTE — H&P
[facility]      Patient Name: Daphne Mccurdy  : 1960  MRN: [id number]  Primary Care Physician: Provider, No Known  Date of admission: 2020    Patient Care Team:  Provider, No Known as PCP - General          Subjective   History Present Illness     Chief Complaint:   Chief Complaint   Patient presents with   • Shortness of Breath       Ms. Mccurdy is a 59 y.o. female with past medical history of anxiety, CHF, depression, hypertension, hyperlipidemia and seasonal allergies who presents to [facility] complaining of worsening shortness of air.  Patient has a diagnosis of end-stage COPD currently sees hospice though she confirms that she is a full code at the time of my exam.  She states that for the past several weeks she has had worsening dyspnea as well as a several day history of a cough productive of green sputum.  She reports her hospice nurse has been coming to see her on a daily basis but has been unable to improve her condition with PRN therapies.  She also reports some chest pain on the evening of 2020 which was relieved with nitroglycerin in the ED as well as some nausea without vomiting but denies any recent fever, changes in bowel or bladder habits, palpitations, peripheral edema or syncope.  It is also reported that patient stop smoking approximately 3 weeks prior    In the ED patient found to have lab significant for troponin of 0.042, proBNP: 453.3, potassium: 2.4, CO2: 42.0, creatinine: 1.04 with a BUN of 17, hemoglobin: 11.3.  UA unremarkable.  EKG reported with A. fib at a rate of 107 with a QTC of 421 and significant baseline artifact noted.  Covid19 test negative.    History of Present Illness    Review of Systems   Constitution: Negative.   HENT: Negative.    Eyes: Negative.    Cardiovascular: Positive for chest pain, dyspnea on exertion and orthopnea. Negative for leg swelling and palpitations.   Respiratory: Negative.       Endocrine: Negative.    Skin: Negative.    Musculoskeletal: Negative.    Gastrointestinal: Positive for nausea. Negative for abdominal pain, change in bowel habit and vomiting.   Genitourinary: Negative.    Neurological: Negative.    Psychiatric/Behavioral: Negative.            Personal History     Past Medical History:   Past Medical History:   Diagnosis Date   • Asthma    • Cellulitis    • COPD (chronic obstructive pulmonary disease) (CMS/[facility])    • GERD (gastroesophageal reflux disease)    • Hepatitis C    • Hyperlipidemia    • Hypertension    • Pneumonia        Surgical History:      Past Surgical History:   Procedure Laterality Date   • MANDIBLE SURGERY     • TUBAL ABDOMINAL LIGATION             Family History: family history is not on file. Otherwise pertinent FHx was reviewed and unremarkable.     Social History:  reports that she has been smoking. She does not have any smokeless tobacco history on file. She reports that she has current or past drug history. Drug: Marijuana. She reports that she does not drink alcohol.      Medications:  Prior to Admission medications    Medication Sig Start Date End Date Taking? Authorizing Provider   amLODIPine (NORVASC) 5 MG tablet Take 5 mg by mouth Daily.   Yes Tommie Mcdonald MD   atorvastatin (LIPITOR) 20 MG tablet Take 20 mg by mouth Daily.   Yes Tommie Mcdonald MD   bumetanide (BUMEX) 1 MG tablet Take 1 mg by mouth Daily.   Yes Tommie Mcdonald MD   docusate sodium (COLACE) 100 MG capsule Take 100 mg by mouth 2 (Two) Times a Day.   Yes Tommie Mcdonald MD   DULoxetine (CYMBALTA) 30 MG capsule Take 30 mg by mouth Daily.   Yes Tommie Mcdonald MD   LORazepam (ATIVAN) 1 MG tablet Take 1 mg by mouth Every 6 (Six) Hours As Needed for Anxiety.   Yes Tommie Mcdonald MD   melatonin 1 MG tablet Take 6 mg by mouth Every Night.   Yes Tommie Mcdonald MD   Morphine (MS CONTIN) 30 MG 12 hr tablet Take 30 mg by mouth 2 (Two) Times a Day.    Yes Tommie Mcdonald MD   Morphine (MSIR) 15 MG tablet Take 15 mg by mouth Every 6 (Six) Hours As Needed for Severe Pain .   Yes Tommie Mcdonald MD   potassium chloride (KLOR-CON) 20 MEQ packet Take 20 mEq by mouth Daily.   Yes Tommie Mcdonald MD   predniSONE (DELTASONE) 10 MG tablet Take 20 mg by mouth 2 (Two) Times a Day.   Yes Tommie Mcdonald MD   prochlorperazine (COMPAZINE) 10 MG tablet Take 10 mg by mouth Every 6 (Six) Hours As Needed for Nausea or Vomiting.   Yes Tommie Mcdonald MD   senna (senna) 8.6 MG tablet Take 1 tablet by mouth 2 (Two) Times a Day.   Yes Tommie Mcdonald MD   Morphine (MSIR) 30 MG tablet Take 30 mg by mouth At Night As Needed for Severe Pain .  7/14/20 Yes Tommie Mcdonald MD   loratadine (CLARITIN) 10 MG tablet Take 10 mg by mouth Daily.    Tommie Mcdonald MD   albuterol sulfate  (90 Base) MCG/ACT inhaler Inhale 2 puffs Every 6 (Six) Hours As Needed for Wheezing or Shortness of Air.  7/14/20  Tommie Mcdonald MD   albuterol sulfate  (90 Base) MCG/ACT inhaler Inhale 2 puffs Every 4 (Four) Hours As Needed for Wheezing. 7/19/19 7/14/20  Cathy Eagle APRN   buprenorphine-naloxone (SUBOXONE) 8-2 MG per SL tablet Place 2 tablets under the tongue Daily.  7/14/20  Tommie Mcdonald MD   doxycycline (VIBRAMYCIN) 100 MG capsule Take 100 mg by mouth 2 (Two) Times a Day. 7/12/19 7/14/20  Tommie Mcdonald MD   fluticasone (FLONASE) 50 MCG/ACT nasal spray 1 spray into the nostril(s) as directed by provider 2 (Two) Times a Day.  7/14/20  Tommie Mcdonald MD   Fluticasone Furoate-Vilanterol (BREO ELLIPTA) 200-25 MCG/INH inhaler Inhale 1 puff Daily.  7/14/20  Tommie Mcdonald MD   ipratropium-albuterol (DUO-NEB) 0.5-2.5 mg/3 ml nebulizer Take 3 mL by nebulization Every 4 (Four) Hours As Needed for Wheezing. 7/19/19 7/14/20  Cathy Eagle APRN   montelukast (SINGULAIR) 10 MG tablet Take 10 mg by mouth Every  Night.  7/14/20  Tommie Mcdonald MD   pantoprazole (PROTONIX) 40 MG EC tablet Take 40 mg by mouth Daily As Needed (indigestion).  7/14/20  Tommie Mcdonald MD   promethazine (PHENERGAN) 25 MG tablet Take 25 mg by mouth Every 8 (Eight) Hours As Needed for Nausea or Vomiting.  7/14/20  Tommie Mcdonald MD   promethazine-dextromethorphan (PROMETHAZINE-DM) 6.25-15 MG/5ML syrup Take 5 mL by mouth 4 (Four) Times a Day As Needed for Cough. 9/8/19 7/14/20  Manuelito Guardado MD   raNITIdine (ZANTAC) 150 MG tablet Take 150 mg by mouth 2 (Two) Times a Day.  7/14/20  Tommie Mcdonald MD   roflumilast (DALIRESP) 500 MCG tablet tablet Take 125 mcg by mouth Daily. Pt is taking a 1/4 of a 500 mcg tablet  7/14/20  Tommie Mcdonald MD   Umeclidinium Bromide (INCRUSE ELLIPTA) 62.5 MCG/INH aerosol powder  Inhale 1 puff Daily.  7/14/20  Tommie Mcdonald MD       Allergies:  No Known Allergies    Objective   Objective     Vital Signs  Temp:  [97.4 °F (36.3 °C)] 97.4 °F (36.3 °C)  Heart Rate:  [] 99  Resp:  [20-22] 20  BP: (117-124)/(82-85) 119/85  SpO2:  [96 %-100 %] 96 %  on  Flow (L/min):  [2] 2;   Device (Oxygen Therapy): nasal cannula  Body mass index is 21.95 kg/m².    Physical Exam   Constitutional: She is oriented to person, place, and time. She appears well-developed and well-nourished. No distress.   HENT:   Head: Normocephalic and atraumatic.   Right Ear: External ear normal.   Left Ear: External ear normal.   Nose: Nose normal.   Eyes: Conjunctivae and EOM are normal.   Neck: Normal range of motion. No JVD present.   Cardiovascular: Intact distal pulses. Exam reveals gallop.   Pulmonary/Chest: She is in respiratory distress.   Mild respiratory distress with increased effort and bilateral rhonchi noted   Abdominal: Soft. Bowel sounds are normal.   Musculoskeletal: Normal range of motion.   Neurological: She is alert and oriented to person, place, and time.   Skin: Skin is warm and dry.    Multiple bruises noted on patient's extremities with distal lower extremities noted is pale but with adequate pulses   Psychiatric: She has a normal mood and affect. Her behavior is normal. Judgment and thought content normal.   Vitals reviewed.      Results Review:  I have personally reviewed most recent cardiac tracings, lab results and radiology images and interpretations and agree with findings, most notably: Troponin, proBNP, CMP, CBC, EKG.    Results from last 7 days   Lab Units 07/13/20  2231   WBC 10*3/mm3 10.60   HEMOGLOBIN g/dL 11.3*   HEMATOCRIT % 34.3   PLATELETS 10*3/mm3 299     Results from last 7 days   Lab Units 07/13/20  2237 07/13/20  2231   SODIUM mmol/L  --  137   POTASSIUM mmol/L  --  2.4*   CHLORIDE mmol/L  --  81*   CO2 mmol/L  --  42.0*   BUN   --  17   CREATININE mg/dL  --  1.04*   GLUCOSE mg/dL  --  177*   CALCIUM mg/dL  --  9.2   ALT (SGPT) U/L  --  32   AST (SGOT) U/L  --  27   TROPONIN T ng/mL  --  0.042*   PROBNP pg/mL  --  453.3   LACTATE mmol/L 2.0  --      Estimated Creatinine Clearance: 50 mL/min (A) (by C-G formula based on SCr of 1.04 mg/dL (H)).  Brief Urine Lab Results  (Last result in the past 365 days)      Color   Clarity   Blood   Leuk Est   Nitrite   Protein   CREAT   Urine HCG        07/13/20 2324 Yellow Clear Negative Negative Negative Negative               Microbiology Results (last 10 days)     Procedure Component Value - Date/Time    COVID-19 [facility] IN-HOUSE, Nasal Swab No Transport Media - Swab, Nasal Cavity [[id number]]  (Normal) Collected:  07/13/20 2331    Lab Status:  Final result Specimen:  Swab from Nasal Cavity Updated:  07/14/20 0016     COVID19 Not Detected    Narrative:       Fact sheet for providers: https://www.fda.gov/media/150188/download     Fact sheet for patients: https://www.fda.gov/media/416022/download          ECG/EMG Results (most recent)     Procedure Component Value Units Date/Time    ECG 12 Lead [[id number]] Collected:  07/13/20 2225      Updated:  07/13/20 2227    Narrative:       HEART RATE= 107  bpm  RR Interval= 563  ms  VA Interval=   ms  P Horizontal Axis=   deg  P Front Axis=   deg  QRSD Interval= 95  ms  QT Interval= 347  ms  QRS Axis= 247  deg  T Wave Axis= 67  deg  - ABNORMAL ECG -  Atrial fibrillation  Ventricular premature complex  Left anterior fascicular block  RVH with secondary repolarization abnrm  Electronically Signed By:   Date and Time of Study: 2020-07-13 22:25:49                    No radiology results for the last 7 days      Estimated Creatinine Clearance: 50 mL/min (A) (by C-G formula based on SCr of 1.04 mg/dL (H)).    Assessment/Plan   Assessment/Plan       Active Hospital Problems    Diagnosis  POA   • Acute exacerbation of chronic obstructive pulmonary disease (COPD) (CMS/[facility]) [J44.1]  Yes     Priority: High   • Essential hypertension [I10]  Yes     Priority: Medium   • Congestive heart failure (CHF) (CMS/[facility]) [I50.9]  Yes     Priority: Medium   • Hyperlipidemia [E78.5]  Yes     Priority: Low   • Anxiety disorder [F41.9]  Yes     Priority: Low      Resolved Hospital Problems   No resolved problems to display.     Acute on chronic COPD  -Patient presents with several week history of worsening dyspnea along with several day history of increasing cough productive of green/yellow sputum  -CXR reported by ED provider showing changes consistent with COPD without evidence of infiltrate, or collapse and a borderline cardiac size.  -EKG reported with A. fib at a rate of 107 with a QTC of 421 and significant baseline artifact noted.    -WBCs: 10.60  -Check procalcitonin  -125 mg Solu-Medrol given in the ED, 40 mg prednisone PO q day x 5 days ordered  - Duoneb  - Pulmicort  - Oxygen, set of spirometry & Pulse Oximetry    Chest pain  -Patient reports chest pain which seems to correlate with recent increase in respiratory symptoms  -Troponin: 0.042, trend  -Chest x-ray reported by ED provider showing changes consistent with COPD  without evidence of infiltrate, or collapse and a borderline cardiac size.  -EKG reported with A. fib at a rate of 107 with a QTC of 421 and significant baseline artifact noted.  -In the ED patient given aspirin and Nitropaste with some relief, continue  -Patient may benefit from further cardiac testing or cardiology consult     Hypertension  -Well controlled with a blood pressure on admission of 117/83  - Continue amlodipine  - Monitor while admitted    CHF  -proBNP: 453.3  -Continue Bumex    Decubitus ulcer present on admission  -Decubitus ulcer noted on left side of patient's lower back near her buttocks appears to be stage II  -Wound care consulted    Leg wounds  -Bilateral wounds left larger than right noted on anterior surface of patient's tibia's with drainage noted  -Wound culture obtained  -Wound care consulted    Hyperlipidemia  -Continue statin    Anxiety  -Continue Ativan    Depression  -Continue Cymbalta    Seasonal allergies  -Continue Zyrtec            VTE Prophylaxis -Lovenox  Mechanical Order History:     None      Pharmalogical Order History:     Ordered     Dose Route Frequency Stop    07/14/20 0023  enoxaparin (LOVENOX) syringe 40 mg      40 mg SC Once 07/14/20 0035    Signed and Held  enoxaparin (LOVENOX) syringe 40 mg      40 mg SC Every 24 Hours --          CODE STATUS: Full  Code Status and Medical Interventions:   Ordered at: 07/14/20 0130     Code Status:    CPR     Medical Interventions (Level of Support Prior to Arrest):    Full       I discussed the patient's findings and my recommendations with patient.        Electronically signed by Melvin Oquendo PA-C, 07/14/20, 1:32 AM.  [facility] [place] Hospitalist Team

## 2020-07-14 NOTE — PLAN OF CARE
Problem: Patient Care Overview  Goal: Plan of Care Review  Outcome: Ongoing (interventions implemented as appropriate)  Flowsheets (Taken 7/14/2020 0168)  Progress: no change  Plan of Care Reviewed With: patient  Outcome Summary: pt c/o pain in neck and back gave prn med, pt short of air, wound consulted for stage on butt  and wounds on both legs, pt resting

## 2020-07-14 NOTE — ED PROVIDER NOTES
Subjective   59-year-old female who complained of increasing shortness of breath in the last 2 days.  She has had increasingly frequent cough and shortness of breath.  She reports cough productive yellow sputum.  She states she is home oxygen dependent and has had recent trouble with her nebulizer being broken.  She reports that she has had some intermittent leg swelling and has tried to use Ace wraps to help out.  She states she has dyspnea on exertion.  She denies night sweats or hemoptysis.  She states that she has significant chest pain frequently and states that morphine does not help that she has to have hydromorphone          Review of Systems   HENT: Negative for drooling, sinus pain and trouble swallowing.    Eyes: Negative for discharge.   Respiratory: Positive for cough, chest tightness, shortness of breath and wheezing.    Cardiovascular: Positive for chest pain and leg swelling. Negative for palpitations.   Genitourinary: Negative for flank pain.   Hematological: Bruises/bleeds easily.   All other systems reviewed and are negative.      Past Medical History:   Diagnosis Date   • Asthma    • Cellulitis    • COPD (chronic obstructive pulmonary disease) (CMS/HCC)    • GERD (gastroesophageal reflux disease)    • Hepatitis C    • Hyperlipidemia    • Hypertension    • Pneumonia        No Known Allergies    Past Surgical History:   Procedure Laterality Date   • MANDIBLE SURGERY     • TUBAL ABDOMINAL LIGATION         No family history on file.    Social History     Socioeconomic History   • Marital status:      Spouse name: Not on file   • Number of children: Not on file   • Years of education: Not on file   • Highest education level: Not on file   Tobacco Use   • Smoking status: Current Every Day Smoker   Substance and Sexual Activity   • Alcohol use: No     Frequency: Never   • Drug use: Yes     Types: Marijuana           Objective   Physical Exam  Alert [place] Coma Scale 15 obviously short of breath  HEENT: Pupils equal and reactive to light. Conjunctivae are not injected. normal tympanic membranes. Oropharynx and nares are normal.   Neck: Supple. Midline trachea. No JVD. No goiter.   Chest: Expiratory wheezes and extensive rhonchi are noted bilaterally and equal breath sounds bilaterally regular rate and rhythm without murmur or rub.   Abdomen: Positive bowel sounds nontender nondistended. No rebound or peritoneal signs. No CVA tenderness.   Extremities no clubbing cyanosis the patient has a trace of pedal and ankle edema.  She has Ace wraps which are not i compressibly wound noted in place bilaterally motor sensory exam is normal the full range of motion is intact   skin: Warm and dry, no rashes or petechia.   Lymphatic: No regional lymphadenopathy. No calf pain, swelling or Moriah's sign    Procedures           ED Course  ED Course as of Jul 14 0031 Tue Jul 14, 2020   0021 I examined the patient using the appropriate personal protective equipment.            [TH]      ED Course User Index  [TH] Leobardo Evans MD                .EDLABS  Medications   sodium chloride 0.9 % flush 10 mL (has no administration in time range)   aspirin tablet 325 mg (has no administration in time range)   nitroglycerin (NITROSTAT) ointment 1 inch (has no administration in time range)   enoxaparin (LOVENOX) syringe 40 mg (has no administration in time range)   azithromycin (ZITHROMAX) tablet 500 mg (has no administration in time range)   ipratropium-albuterol (DUO-NEB) nebulizer solution 3 mL (3 mL Nebulization Given 7/13/20 2307)   methylPREDNISolone sodium succinate (SOLU-Medrol) injection 125 mg (125 mg Intravenous Given 7/13/20 2301)   ceFEPime (MAXIPIME) in SWFI 2g/10ml IV PUSH syringe (2 g Intravenous Given 7/13/20 2302)   potassium chloride (K-DUR,KLOR-CON) CR tablet 40 mEq (40 mEq Oral Given 7/13/20 2331)   HYDROmorphone (DILAUDID) injection 0.5 mg (0.5 mg Intravenous Given 7/13/20 2355)   ondansetron (ZOFRAN) injection  4 mg (4 mg Intravenous Given 7/13/20 6585)                      ER evaluation of chest x-ray shows borderline cardiomegaly without evidence of infiltrate or collapse or chronic COPD changes          MDM  Number of Diagnoses or Management Options     Amount and/or Complexity of Data Reviewed  Clinical lab tests: reviewed  Tests in the radiology section of CPT®: reviewed  Tests in the medicine section of CPT®: reviewed  Review and summarize past medical records: yes  Discuss the patient with other providers: yes  Independent visualization of images, tracings, or specimens: yes    Risk of Complications, Morbidity, and/or Mortality  Presenting problems: high  Diagnostic procedures: high  Management options: high  General comments: The case was discussed with the hospitalist practitioner.  The patient will be admitted for intravenous antibiotics, steroids, oxygen support, bronchodilators.  Fortunately the novel Corona test was negative.  The patient is apparently followed by hospice.  She would likely benefit from a new nebulizer.  The patient was agreeable to this plan of treatment        Final diagnoses:   None

## 2020-07-14 NOTE — NURSING NOTE
"59-year-old female who presented to the ED with an acute exacerbation of COPD.  Per the EMR she is a hospice patient at home.  Patient is sitting up in the chair at the time of visit and voices complaints of hurting \"everywhere \"    There are 4 partial-thickness skin tear in the bilateral lower extremities just below the knee and shin on the left.  The right lower extremity skin tear is pink and clean partial-thickness there are not any overt symptoms of infection the table was a small to moderate amount of serosanguineous exudat no edema to the lower to the right lower extremity noted    Left lower extremity/shin skin tear: Wound is partial-thickness it is pink and clean draining small to moderate amount of serosanguineous exudate no edema noted to the lower extremity there are multiple areas of bruising noted patient states that the Ace wraps which she has been using hurt her.  It is difficult to palpate a pedal pulse in either foot.    Moisture associated dermatitis some slight excoriation to the perineal buttock area would recommend continuing with a zinc oxide-based moisture barrier to these areas and adding a waffle chair cushion to patient chair as she has having difficulty breathing and is wanting to stay up in the chair.    We will recommend a silver silicone foam border dressing to the bilateral lower extremity skin tears will need to monitor the patient as I am going to leave off the Ace wraps at this time because of her complaints of them bothering her and concerned that the Ace wraps may actually be causing the tears in her skin will need to monitor this  "

## 2020-07-14 NOTE — ED NOTES
Attempted report.  Receiving nurse was in with a new admit on the floor.  I was asked to call back in 5 mins.     Elvi Unger, RN  07/14/20 0112

## 2020-07-14 NOTE — CONSULTS
CARDIOLOGY CONSULT NOTE      Referring Provider: Dr. Louie    Reason for Consultation: Chest pain, reported elevated troponin    Attending: Ying Louie MD    Chief complaint    Dyspnea and chest pain with cough    Subjective .     History of present illness:  Daphne Mccurdy is a 59 y.o. 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  She is under hospice care at home.  The patient tells me that she has a history of previous MI but has never had stenting or bypass surgery.    She has multiple skin tears to bilateral lower extremities.  She also has some excoriation in the perineal area    Troponin 0.042, 0.033, 0.026        Review of Systems   Constitution: Positive for malaise/fatigue. Negative for decreased appetite and diaphoresis.   HENT: Negative for congestion, hearing loss and nosebleeds.    Cardiovascular: Positive for chest pain and dyspnea on exertion. Negative for claudication, irregular heartbeat, leg swelling, near-syncope, orthopnea, palpitations, paroxysmal nocturnal dyspnea and syncope.   Respiratory: Positive for cough, shortness of breath, sleep disturbances due to breathing and sputum production.    Endocrine: Negative for polyuria.   Hematologic/Lymphatic: Does not bruise/bleed easily.   Skin: Negative for itching and rash.   Musculoskeletal: Negative for back pain, muscle weakness and myalgias.   Gastrointestinal: Negative for abdominal pain, change in bowel habit and nausea.   Genitourinary: Negative for dysuria, flank pain, frequency and hesitancy.   Neurological: Negative for dizziness, tremors and weakness.   Psychiatric/Behavioral: Negative for altered mental status. The patient does not have insomnia.        History  Past Medical History:   Diagnosis Date   • Asthma    • Cellulitis    • COPD (chronic obstructive pulmonary disease)  (CMS/HCC)    • GERD (gastroesophageal reflux disease)    • Hepatitis C    • Hyperlipidemia    • Hypertension    • Pneumonia        Past Surgical History:   Procedure Laterality Date   • MANDIBLE SURGERY     • TUBAL ABDOMINAL LIGATION         No family history on file.    Social History     Tobacco Use   • Smoking status: Former Smoker     Start date: 1977     Last attempt to quit: 2020     Years since quittin.0   • Smokeless tobacco: Never Used   Substance Use Topics   • Alcohol use: No     Frequency: Never   • Drug use: Yes     Types: Marijuana     Comment: occasionally        Medications Prior to Admission   Medication Sig Dispense Refill Last Dose   • amLODIPine (NORVASC) 5 MG tablet Take 5 mg by mouth Daily.      • atorvastatin (LIPITOR) 20 MG tablet Take 20 mg by mouth Daily.      • bumetanide (BUMEX) 1 MG tablet Take 1 mg by mouth Daily.      • docusate sodium (COLACE) 100 MG capsule Take 100 mg by mouth 2 (Two) Times a Day.      • DULoxetine (CYMBALTA) 30 MG capsule Take 30 mg by mouth Daily.      • LORazepam (ATIVAN) 1 MG tablet Take 1 mg by mouth Every 6 (Six) Hours As Needed for Anxiety.      • melatonin 1 MG tablet Take 6 mg by mouth Every Night.      • Morphine (MS CONTIN) 30 MG 12 hr tablet Take 30 mg by mouth 2 (Two) Times a Day.      • Morphine (MSIR) 15 MG tablet Take 15 mg by mouth Every 6 (Six) Hours As Needed for Severe Pain .      • potassium chloride (KLOR-CON) 20 MEQ packet Take 20 mEq by mouth Daily.      • predniSONE (DELTASONE) 10 MG tablet Take 20 mg by mouth 2 (Two) Times a Day.      • prochlorperazine (COMPAZINE) 10 MG tablet Take 10 mg by mouth Every 6 (Six) Hours As Needed for Nausea or Vomiting.      • senna (senna) 8.6 MG tablet Take 1 tablet by mouth 2 (Two) Times a Day.      • loratadine (CLARITIN) 10 MG tablet Take 10 mg by mouth Daily.            Patient has no known allergies.    Scheduled Meds:    amLODIPine 5 mg Oral Daily   atorvastatin 20 mg Oral Daily    "  azithromycin 500 mg Oral Q24H   budesonide 0.5 mg Nebulization BID - RT   bumetanide 1 mg Oral Daily   cefTRIAXone 1 g Intravenous Q24H   cetirizine 10 mg Oral Daily   DULoxetine 30 mg Oral Daily   enoxaparin 40 mg Subcutaneous Q24H   ipratropium-albuterol 3 mL Nebulization Q4H - RT   Morphine 30 mg Oral Q12H   nicotine 1 patch Transdermal Daily   potassium chloride 20 mEq Oral Daily   predniSONE 40 mg Oral Daily With Breakfast   senna 1 tablet Oral BID   sodium chloride 10 mL Intravenous Q12H     Continuous Infusions:    Pharmacy to Dose Cefepime      PRN Meds:.•  acetaminophen **OR** acetaminophen **OR** acetaminophen  •  Calcium Gluconate-NaCl **AND** calcium gluconate **AND** Calcium, Ionized  •  docusate sodium  •  LORazepam  •  magnesium sulfate **OR** magnesium sulfate **OR** magnesium sulfate  •  melatonin  •  Morphine  •  nitroglycerin  •  ondansetron **OR** ondansetron  •  Pharmacy to Dose Cefepime  •  potassium & sodium phosphates **OR** potassium & sodium phosphates  •  potassium chloride  •  potassium chloride  •  prochlorperazine  •  sodium chloride  •  sodium chloride    Objective     VITAL SIGNS  Vitals:    07/14/20 1148 07/14/20 1437 07/14/20 1525 07/14/20 1526   BP:  125/76     BP Location:  Left arm     Patient Position:  Lying     Pulse: 96 98 94 90   Resp: 20 20 18 18   Temp:  97.4 °F (36.3 °C)     TempSrc:  Oral     SpO2:  94%     Weight:       Height:           Flowsheet Rows      First Filed Value   Admission Height  157.5 cm (62\") Documented at 07/13/2020 2218   Admission Weight  54.4 kg (120 lb) Documented at 07/13/2020 2218           TELEMETRY: SR/ST    Physical Exam:  Physical Exam   Constitutional: She is oriented to person, place, and time. She appears well-developed and well-nourished. No distress.   HENT:   Head: Normocephalic and atraumatic.   Eyes: Pupils are equal, round, and reactive to light.   Neck: Normal range of motion. Neck supple. No JVD present.   Cardiovascular: Normal " rate, regular rhythm, S1 normal, S2 normal, normal heart sounds and intact distal pulses.   No murmur heard.  Pulmonary/Chest: Effort normal. She has decreased breath sounds. She exhibits tenderness.   Abdominal: Soft. Normal appearance. She exhibits no distension. There is no tenderness.   Musculoskeletal: Normal range of motion. She exhibits no edema.   Neurological: She is alert and oriented to person, place, and time.   Skin: Skin is warm and dry. Abrasion and bruising noted.   Psychiatric: She has a normal mood and affect.        Results Review:   I reviewed the patient's new clinical results.    CBC    Results from last 7 days   Lab Units 07/13/20 2231   WBC 10*3/mm3 10.60   HEMOGLOBIN g/dL 11.3*   PLATELETS 10*3/mm3 299     BMP   Results from last 7 days   Lab Units 07/14/20 1415 07/14/20 0432 07/13/20 2231   SODIUM mmol/L  --   --  137   POTASSIUM mmol/L 4.6 3.3* 2.4*   CHLORIDE mmol/L  --   --  81*   CO2 mmol/L  --   --  42.0*   BUN   --   --  17   CREATININE mg/dL  --   --  1.04*   GLUCOSE mg/dL  --   --  177*   MAGNESIUM mg/dL  --  1.8 2.0   PHOSPHORUS mg/dL  --  2.6 2.6     Cr Clearance Estimated Creatinine Clearance: 46.6 mL/min (A) (by C-G formula based on SCr of 1.04 mg/dL (H)).  Coag     HbA1C No results found for: HGBA1C  Blood Glucose No results found for: POCGLU  Infection   Results from last 7 days   Lab Units 07/13/20 2231   PROCALCITONIN ng/mL 0.09     CMP   Results from last 7 days   Lab Units 07/14/20 1415 07/14/20 0432 07/13/20 2231   SODIUM mmol/L  --   --  137   POTASSIUM mmol/L 4.6 3.3* 2.4*   CHLORIDE mmol/L  --   --  81*   CO2 mmol/L  --   --  42.0*   BUN   --   --  17   CREATININE mg/dL  --   --  1.04*   GLUCOSE mg/dL  --   --  177*   ALBUMIN g/dL  --   --  4.10   BILIRUBIN mg/dL  --   --  0.5   ALK PHOS U/L  --   --  103   AST (SGOT) U/L  --   --  27   ALT (SGPT) U/L  --   --  32     ABG      UA    Results from last 7 days   Lab Units 07/13/20  3970   NITRITE UA  Negative          SHABANA  No results found for: POCMETH, POCAMPHET, POCBARBITUR, POCBENZO, POCCOCAINE, POCOPIATES, POCOXYCODO, POCPHENCYC, POCPROPOXY, POCTHC, POCTRICYC  Lysis Labs   Results from last 7 days   Lab Units 07/13/20  2231   HEMOGLOBIN g/dL 11.3*   PLATELETS 10*3/mm3 299   CREATININE mg/dL 1.04*     Radiology(recent) Xr Chest 1 View    Result Date: 7/14/2020  Mildly limited study demonstrating mild linear subsegmental atelectasis and/or scarring in the left lung base.  Electronically Signed By-Yevgeniy Leary On:7/14/2020 7:18 AM This report was finalized on [id number] by  Yevgeniy Leary .      Results from last 7 days   Lab Units 07/14/20  1415   TROPONIN T ng/mL 0.021       Imaging Results (Last 24 Hours)     Procedure Component Value Units Date/Time    XR Chest 1 View [[id number]] Collected:  07/14/20 0714     Updated:  07/14/20 0720    Narrative:       DATE OF EXAM:  7/13/2020 11:14 PM     PROCEDURE:  XR CHEST 1 VW-     INDICATIONS:  dyspnea cough     COMPARISON:  Chest radiographs 07/19/2019, 07/10/2019, and 01/21/2018. CT 01/15/2018.     TECHNIQUE:   Single radiographic AP view of the chest was obtained.     FINDINGS:  The study is limited by lordotic patient positioning. Overlying  artifacts. Mild linear subsegmental atelectasis and/or scarring in the  left lung base. Lungs otherwise clear. No pneumothorax.  Cardiomediastinal contours within normal limits. Calcified  atherosclerotic disease in the thoracic aorta. Multiple old appearing  bilateral rib fracture deformities.        Impression:       Mildly limited study demonstrating mild linear subsegmental atelectasis  and/or scarring in the left lung base.     Electronically Signed ByAnkit Leary On:7/14/2020 7:18 AM  This report was finalized on [id number] by  Yevgeniy Leary, Gill          EKG          I personally viewed and interpreted the patient's EKG/Telemetry data:    ECHOCARDIOGRAM:      STRESS MYOVIEW:    CARDIAC CATHETERIZATION:    OTHER:          Assessment/Plan       Acute exacerbation of chronic obstructive pulmonary disease (COPD) (CMS/[facility])    Essential hypertension    Hyperlipidemia    Anxiety disorder    Congestive heart failure (CHF) (CMS/[facility])    Depression    Seasonal allergies    Decubitus ulcer    Chest pain    Tobacco abuse    Assessment:    Atypical Chest Pain  Insignificant troponin elevation  Dyspnea: Likely related to AECOPD  Advanced home oxygen dependent COPD under hospice care at home  Hypertension  Dyslipidemia    Plan:  Echocardiogram to reassess LV function  Chest Pain, is atypical for angina  Patient under hospice care at home  Does not appear to be a good candidate for additional invasive ischemic evaluation.   Dr. Vera to review echocardiogram   Additional recommendations per Dr. Vera    I discussed the patients findings and my recommendations with patient and RN    Patient is presented with COPD exacerbation and complain of chest pain.    Hemodynamics are stable    Normal S1 and S2.  Expiratory rhonchi noted.  Chest review no crackles.  No leg edema.  Multiple petechiae bruising noted in the upper chest.    Patient is admitted with COPD exacerbation.  Chest pain is noncardiac in nature.  I would review the echocardiogram.  I would not proceed with any cardiac work-up.  Patient has a very poor functional status.  Patient is very frail and weak.  She would not be a candidate for invasive cardiac work-up.  Patient understands and agreed with this strategy.    Electronically signed by Alexis Vera MD, 07/14/20, 4:29 PM.

## 2020-07-14 NOTE — ED PROVIDER NOTES
Subjective   59-year-old female with 2-day history of increasing shortness of breath and increasing cough.  She states she is had a cough productive of yellow sputum for at least 24 hours.  She reports pleuritic chest discomfort.  She states that she has no response to morphine and must have hydromorphone for her pain.  She reports she has had some leg swelling.  She denies night sweats or hemoptysis.  She is apparently followed by hospice.  She states that her nebulizer is broken          Review of Systems   Constitutional: Positive for chills, fatigue and fever.   HENT: Negative for drooling and trouble swallowing.    Eyes: Negative for discharge.   Respiratory: Positive for cough, chest tightness and shortness of breath.    Cardiovascular: Positive for chest pain and leg swelling. Negative for palpitations.   Gastrointestinal: Negative for abdominal pain and nausea.   Hematological: Bruises/bleeds easily.   All other systems reviewed and are negative.      Past Medical History:   Diagnosis Date   • Asthma    • Cellulitis    • COPD (chronic obstructive pulmonary disease) (CMS/HCC)    • GERD (gastroesophageal reflux disease)    • Hepatitis C    • Hyperlipidemia    • Hypertension    • Pneumonia        No Known Allergies    Past Surgical History:   Procedure Laterality Date   • MANDIBLE SURGERY     • TUBAL ABDOMINAL LIGATION         No family history on file.    Social History     Socioeconomic History   • Marital status:      Spouse name: Not on file   • Number of children: Not on file   • Years of education: Not on file   • Highest education level: Not on file   Tobacco Use   • Smoking status: Current Every Day Smoker   Substance and Sexual Activity   • Alcohol use: No     Frequency: Never   • Drug use: Yes     Types: Marijuana           Objective   Physical Exam    Alert [place] Coma Scale 15   HEENT: Pupils equal and reactive to light. Conjunctivae are not injected. normal tympanic membranes. Oropharynx  and nares are normal.   Neck: Supple. Midline trachea. No JVD. No goiter.   Chest: Extensive wheezes and rhonchi are identified and equal breath sounds bilaterally regular rate and rhythm without murmur or rub.   Abdomen: Positive bowel sounds nontender nondistended. No rebound or peritoneal signs. No CVA tenderness.   Extremities no clubbing cyanosis there is some mild pedal and ankle edema.  The patient has a straps in the lower extremities which are not providing compression.  Motor sensory exam is normal the full range of motion is intact   skin: Warm and dry, no rashes or petechia.   Lymphatic: No regional lymphadenopathy. No calf pain, swelling or Moriah's sign    Procedures           ED Course  ED Course as of Jul 14 0037   Tue Jul 14, 2020   0021 I examined the patient using the appropriate personal protective equipment.            [TH]      ED Course User Index  [TH] Leobardo Evans MD                                  Labs Reviewed   COMPREHENSIVE METABOLIC PANEL - Abnormal; Notable for the following components:       Result Value    Glucose 177 (*)     Creatinine 1.04 (*)     Potassium 2.4 (*)     Chloride 81 (*)     CO2 42.0 (*)     eGFR Non  Amer 54 (*)     All other components within normal limits    Narrative:     GFR Normal >60  Chronic Kidney Disease <60  Kidney Failure <15     CBC WITH AUTO DIFFERENTIAL - Abnormal; Notable for the following components:    Hemoglobin 11.3 (*)     RDW 19.0 (*)     RDW-SD 56.0 (*)     All other components within normal limits   TROPONIN (IN-HOUSE) - Abnormal; Notable for the following components:    Troponin T 0.042 (*)     All other components within normal limits    Narrative:     Troponin T Reference Range:  <= 0.03 ng/mL-   Negative for AMI  >0.03 ng/mL-     Abnormal for myocardial necrosis.  Clinicians would have to utilize clinical acumen, EKG, Troponin and serial changes to determine if it is an Acute Myocardial Infarction or myocardial injury due to an  underlying chronic condition.       Results may be falsely decreased if patient taking Biotin.     MANUAL DIFFERENTIAL - Abnormal; Notable for the following components:    Lymphocyte % 9.0 (*)     Monocyte % 1.0 (*)     Bands %  9.0 (*)     Metamyelocyte % 2.0 (*)     Neutrophils Absolute 8.90 (*)     nRBC 1.0 (*)     All other components within normal limits   COVID-19 COULTER BIO IN-HOUSE, NASAL SWAB NO TRANSPORT MEDIA - Normal    Narrative:     Fact sheet for providers: https://www.fda.gov/media/298140/download     Fact sheet for patients: https://www.fda.gov/media/696038/download   BNP (IN-HOUSE) - Normal    Narrative:     Among patients with dyspnea, NT-proBNP is highly sensitive for the detection of acute congestive heart failure. In addition NT-proBNP of <300 pg/ml effectively rules out acute congestive heart failure with 99% negative predictive value.    Results may be falsely decreased if patient taking Biotin.     URINALYSIS W/ CULTURE IF INDICATED - Normal    Narrative:     Urine microscopic not indicated.   BUN - Normal   POC LACTATE - Normal   BLOOD CULTURE   BLOOD CULTURE   BLOOD CULTURE   RAINBOW DRAW    Narrative:     The following orders were created for panel order [place] Draw.  Procedure                               Abnormality         Status                     ---------                               -----------         ------                     Light Blue Top[[id number]]                                   Final result               Green Top (Gel)[[id number]]                                  Final result               Lavender Top[[id number]]                                     Final result               Gold Top - SST[[id number]]                                   Final result                 Please view results for these tests on the individual orders.   SCAN SLIDE   POC LACTATE   POC LACTATE   CBC AND DIFFERENTIAL    Narrative:     The following orders were created for panel order CBC &  Differential.  Procedure                               Abnormality         Status                     ---------                               -----------         ------                     CBC Auto Differential[[id number]]        Abnormal            Final result                 Please view results for these tests on the individual orders.   LIGHT BLUE TOP   GREEN TOP   LAVENDER TOP   GOLD TOP - SST     Medications   sodium chloride 0.9 % flush 10 mL (has no administration in time range)   azithromycin (ZITHROMAX) tablet 500 mg (has no administration in time range)   ipratropium-albuterol (DUO-NEB) nebulizer solution 3 mL (3 mL Nebulization Given 7/13/20 2307)   methylPREDNISolone sodium succinate (SOLU-Medrol) injection 125 mg (125 mg Intravenous Given 7/13/20 2301)   ceFEPime (MAXIPIME) in SWFI 2g/10ml IV PUSH syringe (2 g Intravenous Given 7/13/20 2302)   potassium chloride (K-DUR,KLOR-CON) CR tablet 40 mEq (40 mEq Oral Given 7/13/20 2331)   HYDROmorphone (DILAUDID) injection 0.5 mg (0.5 mg Intravenous Given 7/13/20 2355)   ondansetron (ZOFRAN) injection 4 mg (4 mg Intravenous Given 7/13/20 2354)   aspirin tablet 325 mg (325 mg Oral Given 7/14/20 0034)   nitroglycerin (NITROSTAT) ointment 1 inch (1 inch Topical Given 7/14/20 0035)   enoxaparin (LOVENOX) syringe 40 mg (40 mg Subcutaneous Given 7/14/20 0035)     Chest x-ray shows COPD changes without evidence of infiltrate or collapse borderline cardiac size            MDM  Number of Diagnoses or Management Options  Acute bronchitis, unspecified organism:   Acute exacerbation of chronic obstructive pulmonary disease (COPD) (CMS/[facility]):   Elevated troponin I level:   Pleuritic chest pain:      Amount and/or Complexity of Data Reviewed  Clinical lab tests: reviewed  Tests in the radiology section of CPT®: reviewed  Tests in the medicine section of CPT®: reviewed  Review and summarize past medical records: yes  Discuss the patient with other providers: yes  Independent  visualization of images, tracings, or specimens: yes    Risk of Complications, Morbidity, and/or Mortality  Presenting problems: high  Diagnostic procedures: high  Management options: high  General comments: The case was discussed with the hospitalist practitioner the patient be admitted for intravenous antibiotics and steroids as well as bronchodilators and oxygen support she may benefit from involvement of her hospice liaison or case management to replace her broken nebulizer.  The patient was agreeable with this plan of treatment but vocalized concerns about adequate pain management after ER care        Final diagnoses:   Acute exacerbation of chronic obstructive pulmonary disease (COPD) (CMS/[facility])   Acute bronchitis, unspecified organism   Elevated troponin I level   Pleuritic chest pain            Leobardo Evans MD  07/14/20 0040

## 2020-07-14 NOTE — PLAN OF CARE
Problem: Patient Care Overview  Goal: Plan of Care Review  Outcome: Ongoing (interventions implemented as appropriate)  Flowsheets  Taken 7/14/2020 1528  Progress: declining  Outcome Summary: Patient complaining of constant neck/back pain. Scheduled and prn meds given. Pt very restless this afternoon. SOA, oxygen on. Wound care consulted- see notes. 2d echo ordered. Case management notified of family dynamics/arrangement for discharge. on IV antibitoics. Will continue to monitor.  Taken 7/14/2020 0730  Plan of Care Reviewed With: patient

## 2020-07-14 NOTE — ED NOTES
Patient asking for pain medication.  Informed provider of request.      Elvi Unger, RN  07/13/20 4627

## 2020-07-14 NOTE — ED NOTES
Called the floor.  Receiving nurse still in with new admit. Report attempted.  Informed charge.     Elvi Unger RN  07/14/20 0113

## 2020-07-14 NOTE — DISCHARGE PLACEMENT REQUEST
"Enoc Mccurdy (59 y.o. Female)     Date of Birth Social Security Number Address Home Phone MRN    1960  [id number]  [address]  [place] [address] [phone] [id number]    Jew Marital Status          Gnosticist        Admission Date Admission Type Admitting Provider Attending Provider Department, Room/Bed    7/13/20 Emergency Ying Louie MD Lackey, Diana, MD [facility] 3A MEDICAL INPATIENT, 311/1    Discharge Date Discharge Disposition Discharge Destination                       Attending Provider:  Ying Louie MD    Allergies:  No Known Allergies    Isolation:  None   Infection:  None   Code Status:  CPR    Ht:  157.5 cm (62\")   Wt:  50.7 kg (111 lb 12.8 oz)    Admission Cmt:  None   Principal Problem:  None                Active Insurance as of 7/13/2020     Primary Coverage     Payor Plan Insurance Group Employer/Plan Group    [employer]AN [id number]     Payor Plan Address Payor Plan Phone Number Payor Plan Fax Number Effective Dates    [facility] [address] [phone]  10/1/2015 - None Entered    [facility] [id number]       Subscriber Name Subscriber Birth Date Member ID       ENOC MCCURDY 1960 [id number]                 Emergency Contacts      (Rel.) Home Phone Work Phone Mobile Phone    ELIAS SEGUNDO (Daughter) [phone] -- --              "

## 2020-07-14 NOTE — PROGRESS NOTES
Continued Stay Note   Tevin     Patient Name: Daphne Mccurdy  MRN: [id number]  Today's Date: 7/14/2020    Admit Date: 7/13/2020    Discharge Plan     Row Name 07/14/20 1555       Plan    Plan Comments  CM received note from [facility].  Family issues between daughters.  Pt unsure if she wants to return home with daughter - Nichole.  Other daughter does not think she should go home, and needs rehab.  PT eval pending.  See previous note.               Expected Discharge Date and Time     Expected Discharge Date Expected Discharge Time    Jul 17, 2020             Ana Leon RN

## 2020-07-14 NOTE — ED NOTES
"Patient is on hospice due to \"I can't breath and I can't walk\".  Patient has +2 pitting edema of left foot.  She has bruising bilateral upper/lower extremities. Her legs are wrapped in ACE bandages.        Elvi Unger RN  07/13/20 3745    "

## 2020-07-14 NOTE — PROGRESS NOTES
[facility]  [address] [place] [address]    Pulmonary Disease Educator  Discharge Planning    Patient Name: Daphne Mccurdy  : 1960  Room #: 311/1  Pulmonologist: None  Admit Diagnosis: Pleuritic chest pain [R07.81]  Acute exacerbation of chronic obstructive pulmonary disease (COPD) (CMS/[facility]) [J44.1]  Elevated troponin I level [R79.89]  Acute bronchitis, unspecified organism [J20.9]  Current Admission Date: 2020   Previous Admission Date: 2018  Body mass index is 20.45 kg/m².      Daphne Mccurdy is a former smoker who states that she quit 5 days ago.    PFT’s in the last year?  No     Current O2: 3L   Home O2: 2L  Home BiPap/CPAP: No     ABG: No results found for: SITE, ALLENTEST, PHART, XTL8TEL, PO2ART, LUN4JCR, BASEEXCESS, U4ZZQQVB, HGBBG, HCTABG, OXYHEMOGLOBI, METHHGBN, CARBOXYHGB, CO2CT, BAROMETRIC, MODALITY, FIO2      Current Home Medications:  Prior to Admission medications    Medication Sig Start Date End Date Taking? Authorizing Provider   amLODIPine (NORVASC) 5 MG tablet Take 5 mg by mouth Daily.   Yes Tommie Mcdonald MD   atorvastatin (LIPITOR) 20 MG tablet Take 20 mg by mouth Daily.   Yes Tommie Mcdonald MD   bumetanide (BUMEX) 1 MG tablet Take 1 mg by mouth Daily.   Yes Tommie Mcdonald MD   docusate sodium (COLACE) 100 MG capsule Take 100 mg by mouth 2 (Two) Times a Day.   Yes Tommie Mcdonald MD   DULoxetine (CYMBALTA) 30 MG capsule Take 30 mg by mouth Daily.   Yes Tommie Mcdonald MD   LORazepam (ATIVAN) 1 MG tablet Take 1 mg by mouth Every 6 (Six) Hours As Needed for Anxiety.   Yes Tommie Mcdonald MD   melatonin 1 MG tablet Take 6 mg by mouth Every Night.   Yes Tommie Mcdonald MD   Morphine (MS CONTIN) 30 MG 12 hr tablet Take 30 mg by mouth 2 (Two) Times a Day.   Yes Tommie Mcdonald MD   Morphine (MSIR) 15 MG tablet Take 15 mg by mouth Every 6 (Six) Hours As Needed for Severe Pain .   Yes Tommie Mcdonald MD    potassium chloride (KLOR-CON) 20 MEQ packet Take 20 mEq by mouth Daily.   Yes Tommie Mcdonald MD   predniSONE (DELTASONE) 10 MG tablet Take 20 mg by mouth 2 (Two) Times a Day.   Yes Tommie Mcdonald MD   prochlorperazine (COMPAZINE) 10 MG tablet Take 10 mg by mouth Every 6 (Six) Hours As Needed for Nausea or Vomiting.   Yes Tommie Mcdonald MD   senna (senna) 8.6 MG tablet Take 1 tablet by mouth 2 (Two) Times a Day.   Yes Tommie Mcdonald MD   Morphine (MSIR) 30 MG tablet Take 30 mg by mouth At Night As Needed for Severe Pain .  7/14/20 Yes Tommie Mcdonald MD   loratadine (CLARITIN) 10 MG tablet Take 10 mg by mouth Daily.    Tommie Mcdonald MD   albuterol sulfate  (90 Base) MCG/ACT inhaler Inhale 2 puffs Every 6 (Six) Hours As Needed for Wheezing or Shortness of Air.  7/14/20  Tommie Mcdonald MD   albuterol sulfate  (90 Base) MCG/ACT inhaler Inhale 2 puffs Every 4 (Four) Hours As Needed for Wheezing. 7/19/19 7/14/20  Cathy Eagle APRN   buprenorphine-naloxone (SUBOXONE) 8-2 MG per SL tablet Place 2 tablets under the tongue Daily.  7/14/20  Tommie Mcdonald MD   doxycycline (VIBRAMYCIN) 100 MG capsule Take 100 mg by mouth 2 (Two) Times a Day. 7/12/19 7/14/20  Tommie Mcdonald MD   fluticasone (FLONASE) 50 MCG/ACT nasal spray 1 spray into the nostril(s) as directed by provider 2 (Two) Times a Day.  7/14/20  Tommie Mcdonald MD   Fluticasone Furoate-Vilanterol (BREO ELLIPTA) 200-25 MCG/INH inhaler Inhale 1 puff Daily.  7/14/20  Tommie Mcdonald MD   ipratropium-albuterol (DUO-NEB) 0.5-2.5 mg/3 ml nebulizer Take 3 mL by nebulization Every 4 (Four) Hours As Needed for Wheezing. 7/19/19 7/14/20  Cathy Eagle APRN   montelukast (SINGULAIR) 10 MG tablet Take 10 mg by mouth Every Night.  7/14/20  Provider, MD Tommie   pantoprazole (PROTONIX) 40 MG EC tablet Take 40 mg by mouth Daily As Needed (indigestion).  7/14/20  Provider  MD Tommie   promethazine (PHENERGAN) 25 MG tablet Take 25 mg by mouth Every 8 (Eight) Hours As Needed for Nausea or Vomiting.  7/14/20  Tommie Mcdonald MD   promethazine-dextromethorphan (PROMETHAZINE-DM) 6.25-15 MG/5ML syrup Take 5 mL by mouth 4 (Four) Times a Day As Needed for Cough. 9/8/19 7/14/20  Manuelito Guardado MD   raNITIdine (ZANTAC) 150 MG tablet Take 150 mg by mouth 2 (Two) Times a Day.  7/14/20  Tommie Mcdonald MD   roflumilast (DALIRESP) 500 MCG tablet tablet Take 125 mcg by mouth Daily. Pt is taking a 1/4 of a 500 mcg tablet  7/14/20  Tommie Mcdonald MD   Umeclidinium Bromide (INCRUSE ELLIPTA) 62.5 MCG/INH aerosol powder  Inhale 1 puff Daily.  7/14/20  Tommie Mcdonald MD       Recommendations/Testing:  Daphne ALLEN Kaz was seen by the pulmonary disease educator for evaluation of care gaps according to the COPD GOLD Guidelines.  After evaluation the patient's cardiopulmonary status, it is the recommendation of the care coordinator that the patient receive the following testing, equipment, or consults.    PFT   Palliative Care    Reconciled RT Home Medications:  After evaluation the patient's cardiopulmonary status, it is the recommendation of the care coordinator that the patient receive the following medication changes or additions.    No changes    Prior COPD Education?   No  Prior Pulmonary Rehab?   No  History of COPD admission in the past year?    No    Other Notes: Pt was willing to PLB with me but was not interested in other IA exercises. She states that she is in too much pain, Pt is currently under Hospice care for End-Stage COPD but is currently a full code. She states that her home nebulizer is broke and needs a new one. Will send secure chat to . Pt states she thinks the nebulizer came from [employer]    Insurance: Payor: [employer] / Plan: [employer] / Product Type: *No Product type* /        EDUCATION DONE WITH PATIENT:        COPD           Pursed  Lip Breathing         Relaxation Techniques      STOP BANG (=/> 3 is HIGH RISK):   Do you snore loudly? No  Tired/Fatigued during the day? Yes  Stop Breathing in sleeping? No  High B/P or treated B/P? Yes  BMI greater than 35?   No Body mass index is 20.45 kg/m².  More than 50 years old?  Yes 59 y.o.  Neck Circumference > 40cm    No  Male? No female       TOTAL 3    Has patient seen a sleep Dr.? (Who/When) No  Sleep Study Done? (When) No  Would you like a sleep tech to come talk to you about SENAIT? No

## 2020-07-14 NOTE — PROGRESS NOTES
Discharge Planning Assessment   [facility]     Patient Name: Daphne Mccurdy  MRN: [id number]  Today's Date: 7/14/2020    Admit Date: 7/13/2020    Discharge Needs Assessment     Row Name 07/14/20 1238       Living Environment    Lives With  child(renard), adult lives with daughter    Primary Care Provided by  child(renard);other (see comments) [facility]    Provides Primary Care For  no one, unable/limited ability to care for self    Family Caregiver if Needed  significant other;child(renard), adult    Able to Return to Prior Arrangements  yes       Discharge Needs Assessment    Concerns to be Addressed  discharge planning    Anticipated Changes Related to Illness  inability to care for self    Equipment Needed After Discharge  nebulizer        Discharge Plan     Row Name 07/14/20 1242       Plan    Plan  D/C Plan: Pending PT lisbeth, pt is current with [facility]. Nebulizer ordered through [employer].    Patient/Family in Agreement with Plan  yes    Plan Comments  Attempted to call pt in room due to pandemic.  Pt said she needed a nebulizer and would like for CM to call her boyfriend, Faisal Rodriguez at [phone].  Nebulizer order placed through [employer], pending acceptance.  Pt boyfriend said pt lives with daughter, and is dependent on daughter and boyfriend for all of daily living needs, as well as [facility] providing care twice a week.  Verified [facility] care at [phone] with nurse, Yolanda.  Pt pharmacy verified.  Pt PCP has been dropped due to [facility] care per pt boyfriend.  Pt wears home O2, has nebulizer, WC, and BSC.  D/C Barriers:  PT eval, IV ABX.        Destination      Coordination has not been started for this encounter.      Durable Medical Equipment      Service Provider Request Status Selected Services Address Phone Number Fax Number    [facility] Pending - Request Sent N/A [address] [address] [phone]              Expected Discharge Date and  Time     Expected Discharge Date Expected Discharge Time    Jul 17, 2020         Demographic Summary     Row Name 07/14/20 1232       General Information    Admission Type  observation    Arrived From  emergency department    Required Notices Provided  Observation Status Notice    Referral Source  admission list    Reason for Consult  discharge planning    Preferred Language  English     Used During This Interaction  no        Functional Status     Row Name 07/14/20 1234       Functional Status, IADL    Medications  assistive person       Mental Status Summary    Recent Changes in Mental Status/Cognitive Functioning  unable to assess    Row Name 07/14/20 1233       Functional Status    Usual Activity Tolerance  moderate    Current Activity Tolerance  moderate       Functional Status, IADL    Meal Preparation  assistive person    Housekeeping  assistive person    Laundry  assistive person    Shopping  assistive person                   Ana Leon RN

## 2020-07-14 NOTE — ED NOTES
Called one of the patient's daughter, at request of patient, on her cell phone.  Called other daughter, Jillian, to inform her of her mother's status. This was after getting permission from patient to speak with Jillian.      Elvi Unger RN  07/14/20 0049

## 2020-07-15 NOTE — PROGRESS NOTES
Pulmonary Disease Educator Note:    All pulmonary notes and respiratory medications have been reviewed.  7/14/20 Pt was willing to PLB with me but was not interested in other ND exercises. She states that she is in too much pain, Pt is currently under Hospice care for End-Stage COPD but is currently a full code. She states that her home nebulizer is broke and needs a new one. Will send secure chat to CM. Pt states she thinks the nebulizer came from [employer]

## 2020-07-15 NOTE — PROGRESS NOTES
"Continued Stay Note  [facility]     Patient Name: Daphne Mccurdy  MRN: [id number]  Today's Date: 7/15/2020    Admit Date: 7/13/2020    Discharge Plan     Row Name 07/15/20 1509       Plan    Plan  Pending PT/OT eval. Anticipate routine home with family and [facility]. Has home O2.     Plan Comments  Received notice from Annie with [facility] that this is a routine related admission, email sent to [facility] Daily admission to have primary payor changed to [facility]. Annie also confirmed patients plan is to return home with their services and will be going to her daughters house. Informed Annie there was an order placed that patient needs a wheelchair for home use, asked for [facility] to arrange this DME, she stated they would. Also informed Annie per previous CM note there was a family issue between the two daughters being agreeable to patients d/c plan and wanted to confirm she confirmed the plan with the patient since there was a documented disagreement. Per Annie she did not confirm with the patient, received notice from bedside RN that per \"the daughter\" this was the plan. Attempted to call into room to confirm with patient and line was busy. Secure chat message sent to bedside RN to confirm patient is agreeable to discharge home with [facility].             Katie Lemus RN    "

## 2020-07-15 NOTE — DISCHARGE PLACEMENT REQUEST
"Enoc Mccurdy (59 y.o. Female)     Date of Birth Social Security Number Address Home Phone MRN    1960  [id number]  [address]  [place] [address] [phone] [id number]    Christian Marital Status          Worship        Admission Date Admission Type Admitting Provider Attending Provider Department, Room/Bed    7/13/20 Emergency Dewey Lindsay MD Lackey, Diana, MD [facility] 3A MEDICAL INPATIENT, 311/1    Discharge Date Discharge Disposition Discharge Destination                       Attending Provider:  Ying Louie MD    Allergies:  No Known Allergies    Isolation:  None   Infection:  None   Code Status:  CPR    Ht:  157.5 cm (62\")   Wt:  52.1 kg (114 lb 13.8 oz)    Admission Cmt:  None   Principal Problem:  None                Active Insurance as of 7/13/2020     Primary Coverage     Payor Plan Insurance Group Employer/Plan Group    [id number]     Payor Plan Address Payor Plan Phone Number Payor Plan Fax Number Effective Dates    [address] [phone]  10/1/2015 - None Entered    [facility] [id number]       Subscriber Name Subscriber Birth Date Member ID       ENOC MCCURDY 1960 [id number]                 Emergency Contacts      (Rel.) Home Phone Work Phone Mobile Phone    SANYAELIAS (Daughter) [phone] -- [phone]    Jill Jimenez -- -- [phone]            "

## 2020-07-15 NOTE — PROGRESS NOTES
CARDIOLOGY FOLLOW-UP PROGRESS NOTE      Reason for follow-up:    Chest Pain  Reported elevated troponin     Attending: Ying Louie MD      Subjective .     Chronic dyspnea  No new chest pain     Review of Systems   Constitution: Positive for malaise/fatigue. Negative for decreased appetite and diaphoresis.   HENT: Negative for congestion, hearing loss and nosebleeds.    Cardiovascular: Positive for chest pain and dyspnea on exertion. Negative for claudication, irregular heartbeat, leg swelling, near-syncope, orthopnea, palpitations, paroxysmal nocturnal dyspnea and syncope.   Respiratory: Positive for cough, shortness of breath and sleep disturbances due to breathing.    Endocrine: Negative for polyuria.   Hematologic/Lymphatic: Does not bruise/bleed easily.   Skin: Negative for itching and rash.   Musculoskeletal: Negative for back pain, muscle weakness and myalgias.   Gastrointestinal: Negative for abdominal pain, change in bowel habit and nausea.   Genitourinary: Negative for dysuria, flank pain, frequency and hesitancy.   Neurological: Positive for weakness. Negative for dizziness and tremors.   Psychiatric/Behavioral: Negative for altered mental status. The patient does not have insomnia.        Allergies: Patient has no known allergies.    Scheduled Meds:  amLODIPine 5 mg Oral Daily   atorvastatin 20 mg Oral Daily   azithromycin 500 mg Oral Q24H   budesonide 0.5 mg Nebulization BID - RT   bumetanide 1 mg Oral Daily   cefTRIAXone 1 g Intravenous Q24H   cetirizine 10 mg Oral Daily   DULoxetine 30 mg Oral Daily   enoxaparin 40 mg Subcutaneous Q24H   ipratropium-albuterol 3 mL Nebulization Q4H - RT   Morphine 30 mg Oral Q12H   nicotine 1 patch Transdermal Daily   permethrin  Topical Once   potassium chloride 20 mEq Oral Daily   predniSONE 40 mg Oral Daily With Breakfast   senna 1 tablet Oral BID   sodium chloride 10 mL Intravenous Q12H       Continuous Infusions:     PRN Meds:.•  acetaminophen **OR**  "acetaminophen **OR** acetaminophen  •  Calcium Gluconate-NaCl **AND** calcium gluconate **AND** Calcium, Ionized  •  docusate sodium  •  LORazepam  •  magnesium sulfate **OR** magnesium sulfate **OR** magnesium sulfate  •  melatonin  •  Morphine  •  nitroglycerin  •  ondansetron **OR** ondansetron  •  potassium & sodium phosphates **OR** potassium & sodium phosphates  •  potassium chloride  •  potassium chloride  •  prochlorperazine  •  sodium chloride  •  sodium chloride    Objective     VITAL SIGNS  Patient Vitals for the past 24 hrs:   BP Temp Temp src Pulse Resp SpO2 Weight   07/15/20 0736 -- -- -- 100 18 98 % --   07/15/20 0732 -- -- -- 100 18 98 % --   07/15/20 0603 110/68 97.5 °F (36.4 °C) Oral 103 18 95 % 52.1 kg (114 lb 13.8 oz)   07/15/20 0251 -- -- -- 88 20 97 % --   07/15/20 0248 -- -- -- 90 20 98 % --   07/14/20 2200 -- -- -- 95 20 97 % --   07/14/20 2155 -- -- -- 99 20 98 % --   07/14/20 2126 135/89 97.2 °F (36.2 °C) Oral 110 20 100 % --   07/14/20 1526 -- -- -- 90 18 -- --   07/14/20 1525 -- -- -- 94 18 -- --   07/14/20 1437 125/76 97.4 °F (36.3 °C) Oral 98 20 94 % --   07/14/20 1148 -- -- -- 96 20 -- --   07/14/20 1147 -- -- -- 95 20 -- --        Flowsheet Rows      First Filed Value   Admission Height  157.5 cm (62\") Documented at 07/13/2020 2218   Admission Weight  54.4 kg (120 lb) Documented at 07/13/2020 2218            Intake/Output Summary (Last 24 hours) at 7/15/2020 0905  Last data filed at 7/14/2020 1244  Gross per 24 hour   Intake 240 ml   Output 0 ml   Net 240 ml        TELEMETRY:     SR/ST    Physical Exam:  Physical Exam   Constitutional: She is oriented to person, place, and time. She appears well-developed and well-nourished. No distress.   HENT:   Head: Normocephalic and atraumatic.   Eyes: Pupils are equal, round, and reactive to light.   Neck: Normal range of motion. Neck supple. No JVD present.   Cardiovascular: Normal rate, regular rhythm, S1 normal, S2 normal, normal heart sounds " and intact distal pulses.   No murmur heard.  Pulmonary/Chest: Effort normal. She has decreased breath sounds.   Abdominal: Soft. Normal appearance. She exhibits no distension. There is no tenderness.   Musculoskeletal: Normal range of motion. She exhibits no edema.   Neurological: She is alert and oriented to person, place, and time.   Skin: Skin is warm and dry. Bruising noted.   Psychiatric: She has a normal mood and affect.          Results Review:   I reviewed the patient's new clinical results.    CBC    Results from last 7 days   Lab Units 07/15/20  0355 07/13/20  2231   WBC 10*3/mm3 10.70 10.60   HEMOGLOBIN g/dL 8.2* 11.3*   PLATELETS 10*3/mm3 253 299     BMP Results from last 7 days   Lab Units 07/15/20  0355 07/14/20  1415 07/14/20  0432 07/13/20  2231   SODIUM mmol/L 138  --   --  137   POTASSIUM mmol/L 4.0 4.6 3.3* 2.4*   CHLORIDE mmol/L 90*  --   --  81*   CO2 mmol/L 36.0*  --   --  42.0*   BUN  19  --   --  17   CREATININE mg/dL 0.92  --   --  1.04*   GLUCOSE mg/dL 140*  --   --  177*   MAGNESIUM mg/dL 1.8  --  1.8 2.0   PHOSPHORUS mg/dL 1.6*  --  2.6 2.6     Cr Clearance Estimated Creatinine Clearance: 54.2 mL/min (by C-G formula based on SCr of 0.92 mg/dL).  Coag     HbA1C No results found for: HGBA1C  Blood Glucose No results found for: POCGLU  Infection Results from last 7 days   Lab Units 07/13/20 2254 07/13/20 2231   BLOODCX  No growth at 24 hours No growth at 24 hours   PROCALCITONIN ng/mL  --  0.09     CMP Results from last 7 days   Lab Units 07/15/20  0355 07/14/20  1415 07/14/20  0432 07/13/20  2231   SODIUM mmol/L 138  --   --  137   POTASSIUM mmol/L 4.0 4.6 3.3* 2.4*   CHLORIDE mmol/L 90*  --   --  81*   CO2 mmol/L 36.0*  --   --  42.0*   BUN  19  --   --  17   CREATININE mg/dL 0.92  --   --  1.04*   GLUCOSE mg/dL 140*  --   --  177*   ALBUMIN g/dL  --   --   --  4.10   BILIRUBIN mg/dL  --   --   --  0.5   ALK PHOS U/L  --   --   --  103   AST (SGOT) U/L  --   --   --  27   ALT (SGPT) U/L   --   --   --  32     ABG      UA  Results from last 7 days   Lab Units 07/13/20  2324   NITRITE UA  Negative     SHABANA  No results found for: POCMETH, POCAMPHET, POCBARBITUR, POCBENZO, POCCOCAINE, POCOPIATES, POCOXYCODO, POCPHENCYC, POCPROPOXY, POCTHC, POCTRICYC  Lysis Labs Results from last 7 days   Lab Units 07/15/20  0355 07/13/20  2231   HEMOGLOBIN g/dL 8.2* 11.3*   PLATELETS 10*3/mm3 253 299   CREATININE mg/dL 0.92 1.04*     Radiology(recent) Xr Chest 1 View    Result Date: 7/14/2020  Mildly limited study demonstrating mild linear subsegmental atelectasis and/or scarring in the left lung base.  Electronically Signed By-Yevgeniy Leary On:7/14/2020 7:18 AM This report was finalized on [id number] by  Yevgeniy Leary, .      Imaging Results (Last 24 Hours)     ** No results found for the last 24 hours. **          Results from last 7 days   Lab Units 07/14/20  1415   TROPONIN T ng/mL 0.021       EKG              I personally viewed and interpreted the patient's EKG/Telemetry data:        ECHOCARDIOGRAM:      pending      STRESS MYOVIEW:      CARDIAC CATHETERIZATION:      OTHER:         Assessment/Plan            Acute exacerbation of chronic obstructive pulmonary disease (COPD) (CMS/[facility])    Essential hypertension    Hyperlipidemia    Anxiety disorder    Congestive heart failure (CHF) (CMS/[facility])    Depression    Seasonal allergies    Decubitus ulcer    Chest pain    Tobacco abuse        ASSESSMENT:    Atypical Chest Pain  Insignificant troponin elevation  Dyspnea: Likely related to AECOPD  Advanced home oxygen dependent COPD under hospice care at home  Hypertension  Dyslipidemia     Plan:  Echocardiogram to reassess LV function  Chest Pain, is atypical for angina  Patient under hospice care at home  Does not appear to be a good candidate for additional invasive ischemic evaluation.   Dr. Vera to review echocardiogram                                         Kira Ye, APRN  07/15/20  09:43

## 2020-07-15 NOTE — NURSING NOTE
Reconsulted for possible stage II pressure injury to the sacral area patient was seen earlier this week patient is incontinent of urine and does wear briefs she has some excoriation which is moisture related to her incontinence however she does not have any open areas to the sacral area or pressure injury the exposed areas that are pink are blanchable and nothing is open at this point in time would recommend continuing prevention strategies and continuing with an incontinence protocol

## 2020-07-15 NOTE — PROGRESS NOTES
"      [facility] Medicine Services Daily Progress Note      Hospitalist Team  LOS 0 days      Patient Care Team:  Leobardo Sullivan MD as PCP - General (Family Medicine)    Patient Location: [facility]      Subjective   Subjective     Chief Complaint / Subjective  Chief Complaint   Patient presents with   • Shortness of Breath         Brief Synopsis of Hospital Course/HPI  The patient is a 59-year-old female with end-stage chronic obstructive lung disease who presented with increasing shortness of breath.  The patient also has serious anxiety issues as well as chronic pain.  The patient was found to have lice and has been treated per protocol here at [facility] for this issue he remains in isolation for this issue.    Date:          Review of Systems   Constitution: Positive for malaise/fatigue.   HENT: Negative.    Eyes: Negative.    Cardiovascular: Negative.    Respiratory: Positive for cough and shortness of breath.    Endocrine: Negative.    Hematologic/Lymphatic: Negative.    Skin: Negative.         Bruising and skin tears with some excoriation likely secondary to the lice infestation.   Musculoskeletal: Negative.    Gastrointestinal: Negative.    Genitourinary: Negative.    Neurological: Negative.    Psychiatric/Behavioral: Negative.    Allergic/Immunologic: Negative.    All other systems reviewed and are negative.        Objective   Objective      Vital Signs  Temp:  [97.2 °F (36.2 °C)-97.7 °F (36.5 °C)] 97.7 °F (36.5 °C)  Heart Rate:  [] 102  Resp:  [17-20] 18  BP: (110-135)/(68-89) 115/70  Oxygen Therapy  SpO2: 96 %  Pulse Oximetry Type: Intermittent  Device (Oxygen Therapy): nasal cannula  Device (Oxygen Therapy): nasal cannula  Flow (L/min): 2  Oxygen Concentration (%): 21  Flowsheet Rows      First Filed Value   Admission Height  157.5 cm (62\") Documented at 07/13/2020 2218   Admission Weight  54.4 kg (120 lb) Documented at 07/13/2020 2218        Intake & Output (last 3 days)       " 07/12 0701 - 07/13 0700 07/13 0701 - 07/14 0700 07/14 0701 - 07/15 0700 07/15 0701 - 07/16 0700    P.O.  480 440 480    Total Intake(mL/kg)  480 (9.5) 440 (8.4) 480 (9.2)    Urine (mL/kg/hr)   0 (0) 400 (0.7)    Stool   0 0    Total Output   0 400    Net  +480 +440 +80            Urine Unmeasured Occurrence  1 x 1 x         Lines, Drains & Airways    Active LDAs     Name:   Placement date:   Placement time:   Site:   Days:    Peripheral IV 07/13/20 2233 Right Forearm   07/13/20 2233    Forearm   1    Peripheral IV 07/13/20 2233 Left Hand   07/13/20 2233    Hand   1              Physical Exam:    Physical Exam   Constitutional: She is oriented to person, place, and time. She appears well-developed and well-nourished. No distress.   HENT:   Head: Normocephalic and atraumatic.   Right Ear: External ear normal.   Left Ear: External ear normal.   Nose: Nose normal.   Mouth/Throat: Oropharynx is clear and moist. No oropharyngeal exudate.   Eyes: Pupils are equal, round, and reactive to light. Conjunctivae and EOM are normal. Right eye exhibits no discharge. Left eye exhibits no discharge. No scleral icterus.   Neck: Normal range of motion. No JVD present. No tracheal deviation present. No thyromegaly present.   Cardiovascular: Normal rate, regular rhythm, normal heart sounds and intact distal pulses. Exam reveals no gallop and no friction rub.   No murmur heard.  Pulmonary/Chest: Effort normal and breath sounds normal. No stridor. No respiratory distress. She has no wheezes. She has no rales. She exhibits no tenderness.   Patient has diminished breath sounds throughout.  There is no egophony, fremitus or dullness to percussion.   Abdominal: Soft. Bowel sounds are normal. She exhibits no distension and no mass. There is no tenderness. There is no rebound and no guarding. No hernia.   Musculoskeletal: Normal range of motion. She exhibits no edema, tenderness or deformity.   Lymphadenopathy:     She has no cervical  adenopathy.   Neurological: She is alert and oriented to person, place, and time. No cranial nerve deficit or sensory deficit. She exhibits normal muscle tone. Coordination normal.   Skin: Skin is warm and dry. No rash noted. She is not diaphoretic. No erythema.   The patient has a stage II decubitus ulcer that was present on admission in the gluteal area.  The patient also has very fragile skin with numerous sites of ecchymosis.  Patient also has some wounds on her legs.   Psychiatric: She has a normal mood and affect. Her behavior is normal.   Nursing note and vitals reviewed.       Wounds (last 24 hours)      LDA Wound     Row Name 07/15/20 0715 07/14/20 2120          Wound 07/14/20 0206 medial perineum    Wound - Properties Group Date first assessed: 07/14/20  - Time first assessed: 0206  -SH Present on Hospital Admission: Y  -SH Orientation: medial  -SH Location: perineum  -SH Stage, Pressure Injury: Stage 2  -SH    Dressing Appearance  dry;intact;no drainage  -LATESHA  dry;intact;no drainage  -MG     Closure  Open to air  -LATESHA  Open to air  -MG     Base  non-blanchable;pink  -LATESHA  non-blanchable;pink  -MG     Periwound  --  intact;dry  -MG     Periwound Temperature  --  warm  -MG        Wound 07/14/20 0210 Right anterior;lower;proximal leg Other (comment)    Wound - Properties Group Date first assessed: 07/14/20  - Time first assessed: 0210  -SH Present on Hospital Admission: Y  -SH Side: Right  -SH Orientation: anterior;lower;proximal  -SH Location: leg  -SH Primary Wound Type: Other  -SH, ulcer     Dressing Appearance  no drainage  -LATESHA  no drainage  -MG     Closure  MILDRED  -LATESHA  MILDRED  -MG        Wound 07/14/20 0211 Left anterior;lower leg Other (comment)    Wound - Properties Group Date first assessed: 07/14/20  - Time first assessed: 0211  -SH Present on Hospital Admission: Y  -SH Side: Left  -SH Orientation: anterior;lower  -SH Location: leg  -SH Primary Wound Type: Other  -SH, ulcers     Dressing Appearance   no drainage  -LATESHA  no drainage  -MG     Closure  MILDRED  -LATESHA  MILDRED  -MG       User Key  (r) = Recorded By, (t) = Taken By, (c) = Cosigned By    Initials Name Provider Type    Silva Larios RN Registered Nurse    Kristina Cerna LPN Licensed Nurse    JD Dakin, Jessica, LPN Licensed Nurse        Procedures:    Results Review:     I reviewed the patient's new clinical results.    Lab Results (last 24 hours)     Procedure Component Value Units Date/Time    Wound Culture - Wound, Leg, Left [[id number]]  (Abnormal) Collected:  07/14/20 0242    Specimen:  Wound from Leg, Left Updated:  07/15/20 1143     Wound Culture Heavy growth (4+) Gram Negative Bacilli      Light growth (2+) Staphylococcus aureus      Heavy growth (4+) Corynebacterium species     Comment:   No definitive guidelines. All are susceptible to vancomycin. Resistance to penicillins & cephalosporins does occur.        Gram Stain Rare (1+) WBCs per low power field      Few (2+) Gram positive cocci in pairs and clusters      Rare (1+) Gram positive bacilli    BUN [[id number]]  (Normal) Collected:  07/15/20 0355    Specimen:  Blood Updated:  07/15/20 0709     BUN 19 mg/dL     CBC & Differential [[id number]] Collected:  07/15/20 0355    Specimen:  Blood Updated:  07/15/20 0518    Narrative:       The following orders were created for panel order CBC & Differential.  Procedure                               Abnormality         Status                     ---------                               -----------         ------                     CBC Auto Differential[[id number]]        Abnormal            Final result                 Please view results for these tests on the individual orders.    CBC Auto Differential [[id number]]  (Abnormal) Collected:  07/15/20 0355    Specimen:  Blood Updated:  07/15/20 0518     WBC 10.70 10*3/mm3      RBC 2.94 10*6/mm3      Hemoglobin 8.2 g/dL      Comment: Result checked         Hematocrit 25.2 %      MCV 85.6 fL      MCH 27.8 pg       MCHC 32.4 g/dL      RDW 20.3 %      RDW-SD 60.4 fl      MPV 6.8 fL      Platelets 253 10*3/mm3     Scan Slide [[id number]] Collected:  07/15/20 0355    Specimen:  Blood Updated:  07/15/20 0518     Scan Slide --     Comment: See Manual Differential Results       Manual Differential [[id number]]  (Abnormal) Collected:  07/15/20 0355    Specimen:  Blood Updated:  07/15/20 0518     Neutrophil % 79.0 %      Lymphocyte % 8.0 %      Monocyte % 4.0 %      Bands %  9.0 %      Neutrophils Absolute 9.42 10*3/mm3      Lymphocytes Absolute 0.86 10*3/mm3      Monocytes Absolute 0.43 10*3/mm3      nRBC 1.0 /100 WBC      Anisocytosis Slight/1+     Dacrocytes Slight/1+     Ovalocytes Slight/1+     Stomatocytes Slight/1+     WBC Morphology Normal     Platelet Morphology Normal    Basic Metabolic Panel [[id number]]  (Abnormal) Collected:  07/15/20 0355    Specimen:  Blood Updated:  07/15/20 0457     Glucose 140 mg/dL      BUN --     Comment: Testing performed by alternate method        Creatinine 0.92 mg/dL      Sodium 138 mmol/L      Potassium 4.0 mmol/L      Chloride 90 mmol/L      CO2 36.0 mmol/L      Calcium 9.2 mg/dL      eGFR Non African Amer 62 mL/min/1.73      BUN/Creatinine Ratio --     Comment: Testing not performed        Anion Gap 12.0 mmol/L     Narrative:       GFR Normal >60  Chronic Kidney Disease <60  Kidney Failure <15      Phosphorus [[id number]]  (Abnormal) Collected:  07/15/20 0355    Specimen:  Blood Updated:  07/15/20 0457     Phosphorus 1.6 mg/dL     Magnesium [[id number]]  (Normal) Collected:  07/15/20 0355    Specimen:  Blood Updated:  07/15/20 0453     Magnesium 1.8 mg/dL     Blood Culture - Blood, Arm, Left [[id number]] Collected:  07/13/20 2254    Specimen:  Blood from Arm, Left Updated:  07/14/20 2300     Blood Culture No growth at 24 hours    Blood Culture - Blood, Arm, Right [[id number]] Collected:  07/13/20 2231    Specimen:  Blood from Arm, Right Updated:  07/14/20 2245     Blood Culture No growth at 24  hours        No results found for: HGBA1C            No results found for: LIPASE  No results found for: CHOL, CHLPL, TRIG, HDL, LDL, LDLDIRECT    No results found for: INTRAOP, PREDX, FINALDX, COMDX    Microbiology Results (last 10 days)     Procedure Component Value - Date/Time    Respiratory Panel, PCR - Swab, Nasopharynx [[id number]]  (Normal) Collected:  07/14/20 0439    Lab Status:  Final result Specimen:  Swab from Nasopharynx Updated:  07/14/20 0812     ADENOVIRUS, PCR Not Detected     Coronavirus 229E Not Detected     Coronavirus HKU1 Not Detected     Coronavirus NL63 Not Detected     Coronavirus OC43 Not Detected     Human Metapneumovirus Not Detected     Human Rhinovirus/Enterovirus Not Detected     Influenza B PCR Not Detected     Parainfluenza Virus 1 Not Detected     Parainfluenza Virus 2 Not Detected     Parainfluenza Virus 3 Not Detected     Parainfluenza Virus 4 Not Detected     Bordetella pertussis pcr Not Detected     Influenza A H1 2009 PCR Not Detected     Chlamydophila pneumoniae PCR Not Detected     Mycoplasma pneumo by PCR Not Detected     Influenza A PCR Not Detected     Influenza A H3 Not Detected     Influenza A H1 Not Detected     RSV, PCR Not Detected    Narrative:       The coronavirus on the RVP is NOT COVID-19 and is NOT indicative of infection with COVID-19.     Wound Culture - Wound, Leg, Left [[id number]]  (Abnormal) Collected:  07/14/20 0242    Lab Status:  Preliminary result Specimen:  Wound from Leg, Left Updated:  07/15/20 1143     Wound Culture Heavy growth (4+) Gram Negative Bacilli      Light growth (2+) Staphylococcus aureus      Heavy growth (4+) Corynebacterium species     Comment:   No definitive guidelines. All are susceptible to vancomycin. Resistance to penicillins & cephalosporins does occur.        Gram Stain Rare (1+) WBCs per low power field      Few (2+) Gram positive cocci in pairs and clusters      Rare (1+) Gram positive bacilli    COVID-19 [facility]-HOUSE,  Nasal Swab No Transport Media - Swab, Nasal Cavity [[id number]]  (Normal) Collected:  07/13/20 2331    Lab Status:  Final result Specimen:  Swab from Nasal Cavity Updated:  07/14/20 0016     COVID19 Not Detected    Narrative:       Fact sheet for providers: https://www.fda.gov/media/521492/download     Fact sheet for patients: https://www.fda.gov/media/246283/download    S. Pneumo Ag Urine or CSF - Urine, Urine, Clean Catch [[id number]]  (Abnormal) Collected:  07/13/20 2324    Lab Status:  Final result Specimen:  Urine, Clean Catch Updated:  07/14/20 0300     Strep Pneumo Ag Positive    Legionella Antigen, Urine - Urine, Urine, Clean Catch [[id number]]  (Normal) Collected:  07/13/20 2324    Lab Status:  Final result Specimen:  Urine, Clean Catch Updated:  07/14/20 0300     LEGIONELLA ANTIGEN, URINE Negative    Blood Culture - Blood, Arm, Left [[id number]] Collected:  07/13/20 2254    Lab Status:  Preliminary result Specimen:  Blood from Arm, Left Updated:  07/14/20 2300     Blood Culture No growth at 24 hours    Blood Culture - Blood, Arm, Right [[id number]] Collected:  07/13/20 2231    Lab Status:  Preliminary result Specimen:  Blood from Arm, Right Updated:  07/14/20 2245     Blood Culture No growth at 24 hours        ECG/EMG Results (most recent)     Procedure Component Value Units Date/Time    ECG 12 Lead [[id number]] Collected:  07/13/20 2225     Updated:  07/14/20 1614    Narrative:       HEART RATE= 107  bpm  RR Interval= 563  ms  AZ Interval=   ms  P Horizontal Axis=   deg  P Front Axis=   deg  QRSD Interval= 95  ms  QT Interval= 347  ms  QRS Axis= 247  deg  T Wave Axis= 67  deg  - ABNORMAL ECG -  Atrial fibrillation  Ventricular premature complex  RVH with secondary repolarization abnrm  When compared with ECG of 08-Sep-2019 14:16:55,  Significant axis, voltage or hypertrophy change  Electronically Signed By: Leobardo Evans ([facility]) 14-Jul-2020 16:10:40  Date and Time of Study: 2020-07-13 22:25:49    Adult  Transthoracic Echo Complete W/ Cont if Necessary Per Protocol [[id number]] Collected:  07/14/20 1931     Updated:  07/15/20 1744     BSA 1.5 m^2      RVIDd 2.3 cm      IVSd 1.1 cm      IVSs 1.5 cm      LVIDd 3.6 cm      LVIDs 1.9 cm      LVPWd 1.1 cm      BH CV ECHO HONEY - LVPWS 1.3 cm      IVS/LVPW 0.98     FS 46.3 %      EDV(Teich) 53.1 ml      ESV(Teich) 11.4 ml      EF(Teich) 78.6 %      EDV(cubed) 45.2 ml      ESV(cubed) 7.0 ml      EF(cubed) 84.5 %      % IVS thick 38.6 %      % LVPW thick 18.0 %      LV mass(C)d 117.8 grams      LV mass(C)dI 79.2 grams/m^2      LV mass(C)s 79.2 grams      LV mass(C)sI 53.2 grams/m^2      SV(Teich) 41.7 ml      SI(Teich) 28.0 ml/m^2      SV(cubed) 38.2 ml      SI(cubed) 25.7 ml/m^2      Ao root diam 2.9 cm      Ao root area 6.4 cm^2      ACS 1.9 cm      LVOT diam 1.8 cm      LVOT area 2.4 cm^2      EDV(MOD-sp4) 45.8 ml      ESV(MOD-sp4) 18.4 ml      EF(MOD-sp4) 59.8 %      SV(MOD-sp4) 27.4 ml      SI(MOD-sp4) 18.4 ml/m^2      Ao root area (BSA corrected) 1.9     LV Kim Vol (BSA corrected) 30.7 ml/m^2      LV Sys Vol (BSA corrected) 12.3 ml/m^2      Aortic R-R 0.15 sec      Aortic .6 BPM      MV E max ortega 63.9 cm/sec      MV A max ortega 118.8 cm/sec      MV E/A 0.54     MV V2 max 126.5 cm/sec      MV max PG 6.4 mmHg      MV V2 mean 74.1 cm/sec      MV mean PG 2.5 mmHg      MV V2 VTI 24.0 cm      MVA(VTI) 1.2 cm^2      MV dec slope 274.1 cm/sec^2      MV dec time 0.23 sec      Ao pk ortega 113.2 cm/sec      Ao max PG 5.1 mmHg      Ao max PG (full) 1.0 mmHg      Ao V2 mean 76.3 cm/sec      Ao mean PG 2.6 mmHg      Ao mean PG (full) 0.57 mmHg      Ao V2 VTI 12.0 cm      MARIA DE JESUS(I,A) 2.4 cm^2      MARIA DE JESUS(I,D) 2.4 cm^2      MARIA DE JESUS(V,A) 2.2 cm^2      MARIA DE JESUS(V,D) 2.2 cm^2      LV V1 max PG 4.1 mmHg      LV V1 mean PG 2.0 mmHg      LV V1 max 101.2 cm/sec      LV V1 mean 64.8 cm/sec      LV V1 VTI 11.6 cm      CO(Ao) 30.4 l/min      CI(Ao) 20.4 l/min/m^2      SV(Ao) 76.7 ml      SI(Ao) 51.5 ml/m^2       CO(LVOT) 11.3 l/min      CI(LVOT) 7.6 l/min/m^2      SV(LVOT) 28.5 ml      SI(LVOT) 19.1 ml/m^2      PA V2 max 83.2 cm/sec      PA max PG 2.8 mmHg      PA max PG (full) 0.31 mmHg      PA V2 mean 56.0 cm/sec      PA mean PG 1.5 mmHg      PA mean PG (full) -0.21 mmHg      PA V2 VTI 11.5 cm      PA acc time 0.04 sec      RV V1 max PG 2.5 mmHg      RV V1 mean PG 1.7 mmHg      RV V1 max 78.4 cm/sec      RV V1 mean 62.3 cm/sec      RV V1 VTI 10.4 cm      PA pr(Accel) 61.9 mmHg       CV ECHO HONEY - BZI_BMI 20.3 kilograms/m^2       CV ECHO HONEY - BSA(HAYCOCK) 1.5 m^2       CV ECHO HONEY - BZI_METRIC_WEIGHT 50.3 kg       CV ECHO HONEY - BZI_METRIC_HEIGHT 157.5 cm      EF(MOD-bp) 60.0 %      LA dimension(2D) 2.8 cm     Narrative:       · Left ventricular systolic function is normal.  · Estimated EF appears to be in the range of 61 - 65%.  · Left ventricular diastolic dysfunction (grade I) consistent with   impaired relaxation.              Results for orders placed during the hospital encounter of 07/13/20   Adult Transthoracic Echo Complete W/ Cont if Necessary Per Protocol    Narrative · Left ventricular systolic function is normal.  · Estimated EF appears to be in the range of 61 - 65%.  · Left ventricular diastolic dysfunction (grade I) consistent with   impaired relaxation.        Xr Chest 1 View    Result Date: 7/14/2020  Mildly limited study demonstrating mild linear subsegmental atelectasis and/or scarring in the left lung base.  Electronically Signed By-Yevgeniy Leary On:7/14/2020 7:18 AM This report was finalized on [id number] by  Yevgeniy Leary, .    Xrays, labs reviewed personally by physician.    Medication Review:   I have reviewed the patient's current medication list    Scheduled Meds    amLODIPine 5 mg Oral Daily   atorvastatin 20 mg Oral Daily   azithromycin 500 mg Oral Q24H   budesonide 0.5 mg Nebulization BID - RT   bumetanide 1 mg Oral Daily   cefTRIAXone 1 g Intravenous Q24H   cetirizine 10 mg  Oral Daily   DULoxetine 30 mg Oral Daily   enoxaparin 40 mg Subcutaneous Q24H   ipratropium-albuterol 3 mL Nebulization Q4H - RT   Morphine 30 mg Oral Q12H   nicotine 1 patch Transdermal Daily   permethrin  Topical Once   potassium chloride 20 mEq Oral Daily   predniSONE 40 mg Oral Daily With Breakfast   senna 1 tablet Oral BID   sodium chloride 10 mL Intravenous Q12H     Meds Infusions     Meds PRN  •  acetaminophen **OR** acetaminophen **OR** acetaminophen  •  Calcium Gluconate-NaCl **AND** calcium gluconate **AND** Calcium, Ionized  •  docusate sodium  •  LORazepam  •  magnesium sulfate **OR** magnesium sulfate **OR** magnesium sulfate  •  melatonin  •  Morphine  •  nitroglycerin  •  ondansetron **OR** ondansetron  •  potassium & sodium phosphates **OR** potassium & sodium phosphates  •  potassium chloride  •  potassium chloride  •  prochlorperazine  •  sodium chloride  •  sodium chloride    Assessment/Plan   Assessment/Plan     Active Hospital Problems    Diagnosis  POA   • Acute exacerbation of chronic obstructive pulmonary disease (COPD) (CMS/Formerly [facility]) [J44.1]  Yes   • Essential hypertension [I10]  Yes   • Hyperlipidemia [E78.5]  Yes   • Anxiety disorder [F41.9]  Yes   • Congestive heart failure (CHF) (CMS/Formerly [facility]) [I50.9]  Yes   • Depression [F32.9]  Yes   • Seasonal allergies [J30.2]  Yes   • Decubitus ulcer [L89.90]  Yes   • Chest pain [R07.9]  Yes   • Tobacco abuse [Z72.0]  Yes      Resolved Hospital Problems   No resolved problems to display.   MEDICAL DECISION MAKING COMPLEXITY BY PROBLEM:     1.  End stage COPD  -Continue home medications  -Patient had been on hospice prior to admission  -Currently not dyspneic on home oxygen settings    2.  Lice infestation  -Patient has been placed on isolation  -Patient has been treated per protocol    3.  Chest pain  -Patient had some mild elevations in troponin  -Patient's been evaluated by cardiology       -The patient was hospice and really does not qualify for any  additional work-up at this time  -The elevations in troponin may be due to diaphragmatic spill    4.  Decubitus ulcer stage II present on admission  -Wound care has been consulted  -The patient also has leg wounds that have been cultured and are also being cared for by the wound care team.    5.  Hyperlipidemia  -Continue statin    6.  Anxiety  -Continue home doses of Ativan as needed    7.  Depression  -Continue Cymbalta    8.  Seasonal allergies  -Continue Zyrtec      VTE Prophylaxis -   Mechanical Order History:     None      Pharmalogical Order History:     Ordered     Dose Route Frequency Stop    07/14/20 0217  enoxaparin (LOVENOX) syringe 40 mg      40 mg SC Every 24 Hours Scheduled --    07/14/20 0023  enoxaparin (LOVENOX) syringe 40 mg      40 mg SC Once 07/14/20 0035      Code Status -   Code Status and Medical Interventions:   Ordered at: 07/14/20 0130     Code Status:    CPR     Medical Interventions (Level of Support Prior to Arrest):    Full     Discharge Planning    Destination - Selection Complete      Service Provider Request Status Selected Services Address Phone Number Fax Number    [facility] [address] [address] [phone]      Durable Medical Equipment      Service Provider Request Status Selected Services Address Phone Number Fax Number    [facility] [place] Accepted N/A [id number] [id number] [phone]      Dialysis/Infusion      Coordination has not been started for this encounter.      Home Medical Care      Coordination has not been started for this encounter.      Therapy      Coordination has not been started for this encounter.      Community Resources      Coordination has not been started for this encounter.        Electronically signed by Ying Louie MD, 07/15/20, 18:02.  [facility] Hospitalist Team

## 2020-07-15 NOTE — PLAN OF CARE
Problem: Patient Care Overview  Goal: Plan of Care Review  Outcome: Ongoing (interventions implemented as appropriate)  Note:   Per RN pt about to get Lice treatment. Hold PT. PT follow-up 7/15/2020

## 2020-07-15 NOTE — PROGRESS NOTES
Continued Stay Note  BRODY Abarca     Patient Name: Daphne Mccurdy  MRN: [id number]  Today's Date: 7/15/2020    Admit Date: 7/13/2020    Discharge Plan     Row Name 07/15/20 1628       Plan    Plan  Pending PT/OT eval. Patient wants to go to an ECF with [facility]. Annie liasion notified.     Plan Comments  Per bedside RN patient wants to go to a LTC facility with [facility] care. Nurse stated patient does not feel she can go back home with daughter Nichole due to not having 24/7 assistance in the home. Notified Mohsen RASMUSSEN and Annie [facility] liasion of this information. Patient would like to stay in [facility] if possible, however listed as having [place] Medicaid. Patient stated everything was supposed to be switched over to [place] insurance. Email sent to  [facility] to verify patients  insurance coverage. Per Annie with [facility] she is going to pass this information along to their  for discharge planning.        Katie Lemus RN

## 2020-07-15 NOTE — PLAN OF CARE
Problem: Patient Care Overview  Goal: Plan of Care Review  Outcome: Ongoing (interventions implemented as appropriate)  Flowsheets (Taken 7/15/2020 3395)  Progress: no change  Outcome Summary: Pt slept on and off through the night. Pt c/o pain and discomfort, gave PRN pain medication tolerated well. Pt on 3L of oxygen continuous. Will continue to monitor.

## 2020-07-16 NOTE — PLAN OF CARE
Problem: Patient Care Overview  Goal: Plan of Care Review  Outcome: Ongoing (interventions implemented as appropriate)  Flowsheets (Taken 7/16/2020 0150)  Outcome Summary: patient is currently resting, complains of chronic pain, patient was treated for lice, awaiting on placement for rehab, will continue to monitor patient

## 2020-07-16 NOTE — PROGRESS NOTES
Pulmonary Disease Educator Note:    All pulmonary notes and respiratory medications have been reviewed.  7/14/20 Pt was willing to PLB with me but was not interested in other ME exercises. She states that she is in too much pain, Pt is currently under Hospice care for End-Stage COPD but is currently a full code. She states that her home nebulizer is broke and needs a new one. Will send secure chat to CM. Pt states she thinks the nebulizer came from [facility]

## 2020-07-16 NOTE — NURSING NOTE
Notified Dr. Louie of patient's blood pressure 98/66. Dr. Louie made medication adjustments. Patient remains alert to staff.

## 2020-07-16 NOTE — PROGRESS NOTES
LOS: 1 day   Admiting Physician- Ying Louie MD    Reason For Followup:    Shortness of breath  Elevated troponin  Chest pain  COPD exacerbation    Subjective     Patient is feeling better.  She wants to go home.    Objective     No leg edema noted    Review of Systems:   Review of Systems   Constitution: Negative for chills and fever.   HENT: Negative for ear discharge and nosebleeds.    Eyes: Negative for discharge and redness.   Cardiovascular: Negative for chest pain, orthopnea, palpitations, paroxysmal nocturnal dyspnea and syncope.   Respiratory: Positive for shortness of breath. Negative for cough and wheezing.    Endocrine: Negative for heat intolerance.   Skin: Positive for color change. Negative for rash.   Musculoskeletal: Positive for arthritis and joint pain. Negative for myalgias.   Gastrointestinal: Negative for abdominal pain, melena, nausea and vomiting.   Genitourinary: Negative for dysuria and hematuria.   Neurological: Negative for dizziness, light-headedness, numbness and tremors.   Psychiatric/Behavioral: Negative for depression. The patient is not nervous/anxious.          Vital Signs  Vitals:    07/16/20 0021 07/16/20 0338 07/16/20 0345 07/16/20 0402   BP:    118/71   BP Location:       Patient Position:       Pulse: 101 89 90 111   Resp: 18 20 18 19   Temp:    98.3 °F (36.8 °C)   TempSrc:    Oral   SpO2: 94% (!) 89% 94% 91%   Weight:    53 kg (116 lb 13.5 oz)   Height:         Wt Readings from Last 1 Encounters:   07/16/20 53 kg (116 lb 13.5 oz)       Intake/Output Summary (Last 24 hours) at 7/16/2020 1058  Last data filed at 7/16/2020 0311  Gross per 24 hour   Intake 480 ml   Output 700 ml   Net -220 ml     Physical Exam:  Physical Exam   Constitutional: She is oriented to person, place, and time.   Patient is frail and weak   HENT:   Head: Normocephalic and atraumatic.   Eyes: Right eye exhibits no discharge. No scleral icterus.   Neck: No thyromegaly present.   Cardiovascular:  Normal rate, regular rhythm and normal heart sounds. Exam reveals no gallop and no friction rub.   No murmur heard.  Pulmonary/Chest: Effort normal and breath sounds normal. No respiratory distress. She has no wheezes. She has no rales.   Abdominal: There is no tenderness.   Musculoskeletal: She exhibits no edema.   Lymphadenopathy:     She has no cervical adenopathy.   Neurological: She is alert and oriented to person, place, and time.   Skin: No rash noted. No erythema.   Bruising of skin is noted   Psychiatric: She has a normal mood and affect.       Results Review:   Lab Results (last 24 hours)     Procedure Component Value Units Date/Time    Phosphorus [[id number]]  (Normal) Collected:  07/16/20 0750    Specimen:  Blood Updated:  07/16/20 0950     Phosphorus 2.5 mg/dL     Basic Metabolic Panel [[id number]]  (Abnormal) Collected:  07/16/20 0750    Specimen:  Blood Updated:  07/16/20 0933     Glucose 73 mg/dL      BUN --     Comment: Testing performed by alternate method        Creatinine 0.75 mg/dL      Sodium 140 mmol/L      Potassium 3.9 mmol/L      Chloride 92 mmol/L      CO2 38.0 mmol/L      Calcium 9.5 mg/dL      eGFR Non African Amer 79 mL/min/1.73      BUN/Creatinine Ratio --     Comment: Testing not performed        Anion Gap 10.0 mmol/L     Narrative:       GFR Normal >60  Chronic Kidney Disease <60  Kidney Failure <15      Magnesium [[id number]]  (Normal) Collected:  07/16/20 0750    Specimen:  Blood Updated:  07/16/20 0927     Magnesium 1.8 mg/dL     BUN [[id number]] Collected:  07/16/20 0750    Specimen:  Blood Updated:  07/16/20 0927    Scan Slide [[id number]] Collected:  07/16/20 0750    Specimen:  Blood Updated:  07/16/20 0900     Scan Slide --     Comment: See Manual Differential Results       Manual Differential [[id number]]  (Abnormal) Collected:  07/16/20 0750    Specimen:  Blood Updated:  07/16/20 0900     Neutrophil % 78.0 %      Lymphocyte % 19.0 %      Eosinophil % 1.0 %      Bands %  2.0 %       Neutrophils Absolute 9.92 10*3/mm3      Lymphocytes Absolute 2.36 10*3/mm3      Eosinophils Absolute 0.12 10*3/mm3      Anisocytosis Slight/1+     Poikilocytes Slight/1+     Stomatocytes Slight/1+     WBC Morphology Normal     Platelet Morphology Normal    CBC & Differential [[id number]] Collected:  07/16/20 0750    Specimen:  Blood Updated:  07/16/20 0900    Narrative:       The following orders were created for panel order CBC & Differential.  Procedure                               Abnormality         Status                     ---------                               -----------         ------                     CBC Auto Differential[[id number]]        Abnormal            Final result                 Please view results for these tests on the individual orders.    CBC Auto Differential [[id number]]  (Abnormal) Collected:  07/16/20 0750    Specimen:  Blood Updated:  07/16/20 0900     WBC 12.40 10*3/mm3      RBC 3.28 10*6/mm3      Hemoglobin 9.0 g/dL      Hematocrit 28.2 %      MCV 86.1 fL      MCH 27.4 pg      MCHC 31.9 g/dL      RDW 20.2 %      RDW-SD 60.4 fl      MPV 6.7 fL      Platelets 290 10*3/mm3     Wound Culture - Wound, Leg, Left [[id number]]  (Abnormal)  (Susceptibility) Collected:  07/14/20 0242    Specimen:  Wound from Leg, Left Updated:  07/16/20 0818     Wound Culture Heavy growth (4+) Providencia stuartii      Light growth (2+) Staphylococcus aureus      Heavy growth (4+) Corynebacterium species     Comment:   No definitive guidelines. All are susceptible to vancomycin. Resistance to penicillins & cephalosporins does occur.        Gram Stain Rare (1+) WBCs per low power field      Few (2+) Gram positive cocci in pairs and clusters      Rare (1+) Gram positive bacilli    Susceptibility      Providencia stuartii     PINA     Amikacin Susceptible     Ampicillin Resistant     Ampicillin + Sulbactam Intermediate     Cefepime Susceptible     Ceftazidime Susceptible     Ceftriaxone Susceptible     Gentamicin  Resistant     Levofloxacin Susceptible     Piperacillin + Tazobactam Susceptible     Tetracycline Resistant     Tobramycin Resistant     Trimethoprim + Sulfamethoxazole Susceptible                Susceptibility      Staphylococcus aureus     PINA     Clindamycin Susceptible     Erythromycin Susceptible     Inducible Clindamycin Resistance Negative     Oxacillin Susceptible     Penicillin G Resistant     Rifampin Susceptible     Tetracycline Susceptible     Trimethoprim + Sulfamethoxazole Susceptible     Vancomycin Susceptible                Susceptibility Comments     Providencia stuartii    Cefazolin sensitivity will not be reported for Enterobacteriaceae in non-urine isolates. If cefazolin is preferred, please call the microbiology lab to request an E-test.  With the exception of urinary-sourced infections, aminoglycosides should not be used as monotherapy.    Staphylococcus aureus    This isolate does not demonstrate inducible clindamycin resistance in vitro.    This isolate does not demonstrate inducible clindamycin resistance in vitro.               Blood Culture - Blood, Arm, Left [[id number]] Collected:  07/13/20 2254    Specimen:  Blood from Arm, Left Updated:  07/15/20 2300     Blood Culture No growth at 2 days    Blood Culture - Blood, Arm, Right [[id number]] Collected:  07/13/20 2231    Specimen:  Blood from Arm, Right Updated:  07/15/20 2245     Blood Culture No growth at 2 days        Imaging Results (Last 72 Hours)     Procedure Component Value Units Date/Time    XR Chest 1 View [[id number]] Collected:  07/14/20 0714     Updated:  07/14/20 0720    Narrative:       DATE OF EXAM:  7/13/2020 11:14 PM     PROCEDURE:  XR CHEST 1 VW-     INDICATIONS:  dyspnea cough     COMPARISON:  Chest radiographs 07/19/2019, 07/10/2019, and 01/21/2018. CT 01/15/2018.     TECHNIQUE:   Single radiographic AP view of the chest was obtained.     FINDINGS:  The study is limited by lordotic patient positioning.  Overlying  artifacts. Mild linear subsegmental atelectasis and/or scarring in the  left lung base. Lungs otherwise clear. No pneumothorax.  Cardiomediastinal contours within normal limits. Calcified  atherosclerotic disease in the thoracic aorta. Multiple old appearing  bilateral rib fracture deformities.        Impression:       Mildly limited study demonstrating mild linear subsegmental atelectasis  and/or scarring in the left lung base.     Electronically Signed By-Yevgeniy Leary On:7/14/2020 7:18 AM  This report was finalized on [id number] by  Yevgeniy Leary, .        ECG/EMG Results (most recent)     Procedure Component Value Units Date/Time    ECG 12 Lead [[id number]] Collected:  07/13/20 2225     Updated:  07/14/20 1614    Narrative:       HEART RATE= 107  bpm  RR Interval= 563  ms  NH Interval=   ms  P Horizontal Axis=   deg  P Front Axis=   deg  QRSD Interval= 95  ms  QT Interval= 347  ms  QRS Axis= 247  deg  T Wave Axis= 67  deg  - ABNORMAL ECG -  Atrial fibrillation  Ventricular premature complex  RVH with secondary repolarization abnrm  When compared with ECG of 08-Sep-2019 14:16:55,  Significant axis, voltage or hypertrophy change  Electronically Signed By: Leobardo Evans (Husam) 14-Jul-2020 16:10:40  Date and Time of Study: 2020-07-13 22:25:49    Adult Transthoracic Echo Complete W/ Cont if Necessary Per Protocol [[id number]] Collected:  07/14/20 1931     Updated:  07/15/20 1744     BSA 1.5 m^2      RVIDd 2.3 cm      IVSd 1.1 cm      IVSs 1.5 cm      LVIDd 3.6 cm      LVIDs 1.9 cm      LVPWd 1.1 cm      BH CV ECHO HONEY - LVPWS 1.3 cm      IVS/LVPW 0.98     FS 46.3 %      EDV(Teich) 53.1 ml      ESV(Teich) 11.4 ml      EF(Teich) 78.6 %      EDV(cubed) 45.2 ml      ESV(cubed) 7.0 ml      EF(cubed) 84.5 %      % IVS thick 38.6 %      % LVPW thick 18.0 %      LV mass(C)d 117.8 grams      LV mass(C)dI 79.2 grams/m^2      LV mass(C)s 79.2 grams      LV mass(C)sI 53.2 grams/m^2      SV(Teich) 41.7 ml      SI(Teich)  28.0 ml/m^2      SV(cubed) 38.2 ml      SI(cubed) 25.7 ml/m^2      Ao root diam 2.9 cm      Ao root area 6.4 cm^2      ACS 1.9 cm      LVOT diam 1.8 cm      LVOT area 2.4 cm^2      EDV(MOD-sp4) 45.8 ml      ESV(MOD-sp4) 18.4 ml      EF(MOD-sp4) 59.8 %      SV(MOD-sp4) 27.4 ml      SI(MOD-sp4) 18.4 ml/m^2      Ao root area (BSA corrected) 1.9     LV Kim Vol (BSA corrected) 30.7 ml/m^2      LV Sys Vol (BSA corrected) 12.3 ml/m^2      Aortic R-R 0.15 sec      Aortic .6 BPM      MV E max ortega 63.9 cm/sec      MV A max ortega 118.8 cm/sec      MV E/A 0.54     MV V2 max 126.5 cm/sec      MV max PG 6.4 mmHg      MV V2 mean 74.1 cm/sec      MV mean PG 2.5 mmHg      MV V2 VTI 24.0 cm      MVA(VTI) 1.2 cm^2      MV dec slope 274.1 cm/sec^2      MV dec time 0.23 sec      Ao pk ortega 113.2 cm/sec      Ao max PG 5.1 mmHg      Ao max PG (full) 1.0 mmHg      Ao V2 mean 76.3 cm/sec      Ao mean PG 2.6 mmHg      Ao mean PG (full) 0.57 mmHg      Ao V2 VTI 12.0 cm      MARIA DE JESUS(I,A) 2.4 cm^2      MARIA DE JESUS(I,D) 2.4 cm^2      MARIA DE JESUS(V,A) 2.2 cm^2      MARIA DE JESUS(V,D) 2.2 cm^2      LV V1 max PG 4.1 mmHg      LV V1 mean PG 2.0 mmHg      LV V1 max 101.2 cm/sec      LV V1 mean 64.8 cm/sec      LV V1 VTI 11.6 cm      CO(Ao) 30.4 l/min      CI(Ao) 20.4 l/min/m^2      SV(Ao) 76.7 ml      SI(Ao) 51.5 ml/m^2      CO(LVOT) 11.3 l/min      CI(LVOT) 7.6 l/min/m^2      SV(LVOT) 28.5 ml      SI(LVOT) 19.1 ml/m^2      PA V2 max 83.2 cm/sec      PA max PG 2.8 mmHg      PA max PG (full) 0.31 mmHg      PA V2 mean 56.0 cm/sec      PA mean PG 1.5 mmHg      PA mean PG (full) -0.21 mmHg      PA V2 VTI 11.5 cm      PA acc time 0.04 sec      RV V1 max PG 2.5 mmHg      RV V1 mean PG 1.7 mmHg      RV V1 max 78.4 cm/sec      RV V1 mean 62.3 cm/sec      RV V1 VTI 10.4 cm      PA pr(Accel) 61.9 mmHg       CV ECHO HONEY - BZI_BMI 20.3 kilograms/m^2       CV ECHO HONEY - BSA(HAYCOCK) 1.5 m^2       CV ECHO HONEY - BZI_METRIC_WEIGHT 50.3 kg       CV ECHO HONEY - BZI_METRIC_HEIGHT  157.5 cm      EF(MOD-bp) 60.0 %      LA dimension(2D) 2.8 cm     Narrative:       · Left ventricular systolic function is normal.  · Estimated EF appears to be in the range of 61 - 65%.  · Left ventricular diastolic dysfunction (grade I) consistent with   impaired relaxation.           CBC    Results from last 7 days   Lab Units 07/16/20  0750 07/15/20  0355 07/13/20  2231   WBC 10*3/mm3 12.40* 10.70 10.60   HEMOGLOBIN g/dL 9.0* 8.2* 11.3*   PLATELETS 10*3/mm3 290 253 299     BMP   Results from last 7 days   Lab Units 07/16/20  0750 07/15/20  0355 07/14/20  1415 07/14/20  0432 07/13/20  2231   SODIUM mmol/L 140 138  --   --  137   POTASSIUM mmol/L 3.9 4.0 4.6 3.3* 2.4*   CHLORIDE mmol/L 92* 90*  --   --  81*   CO2 mmol/L 38.0* 36.0*  --   --  42.0*   BUN   --  19  --   --  17   CREATININE mg/dL 0.75 0.92  --   --  1.04*   GLUCOSE mg/dL 73 140*  --   --  177*   MAGNESIUM mg/dL 1.8 1.8  --  1.8 2.0   PHOSPHORUS mg/dL 2.5 1.6*  --  2.6 2.6     CMP   Results from last 7 days   Lab Units 07/16/20  0750 07/15/20  0355 07/14/20  1415 07/14/20  0432 07/13/20  2231   SODIUM mmol/L 140 138  --   --  137   POTASSIUM mmol/L 3.9 4.0 4.6 3.3* 2.4*   CHLORIDE mmol/L 92* 90*  --   --  81*   CO2 mmol/L 38.0* 36.0*  --   --  42.0*   BUN   --  19  --   --  17   CREATININE mg/dL 0.75 0.92  --   --  1.04*   GLUCOSE mg/dL 73 140*  --   --  177*   ALBUMIN g/dL  --   --   --   --  4.10   BILIRUBIN mg/dL  --   --   --   --  0.5   ALK PHOS U/L  --   --   --   --  103   AST (SGOT) U/L  --   --   --   --  27   ALT (SGPT) U/L  --   --   --   --  32     Cardiac Studies:  Echo-   Results for orders placed during the hospital encounter of 07/13/20   Adult Transthoracic Echo Complete W/ Cont if Necessary Per Protocol    Narrative · Left ventricular systolic function is normal.  · Estimated EF appears to be in the range of 61 - 65%.  · Left ventricular diastolic dysfunction (grade I) consistent with   impaired relaxation.        Stress  Myoview-  Cath-      Medication Review:   Scheduled Meds:  amLODIPine 5 mg Oral Daily   atorvastatin 20 mg Oral Daily   budesonide 0.5 mg Nebulization BID - RT   bumetanide 1 mg Oral Daily   cetirizine 10 mg Oral Daily   DULoxetine 30 mg Oral Daily   enoxaparin 40 mg Subcutaneous Q24H   ipratropium-albuterol 3 mL Nebulization Q4H - RT   Morphine 30 mg Oral Q12H   nicotine 1 patch Transdermal Daily   permethrin  Topical Once   piperacillin-tazobactam 3.375 g Intravenous Once   piperacillin-tazobactam 3.375 g Intravenous Q8H   potassium chloride 20 mEq Oral Daily   predniSONE 40 mg Oral Daily With Breakfast   senna 1 tablet Oral BID   sodium chloride 10 mL Intravenous Q12H     Continuous Infusions:   PRN Meds:.•  acetaminophen **OR** acetaminophen **OR** acetaminophen  •  Calcium Gluconate-NaCl **AND** calcium gluconate **AND** Calcium, Ionized  •  docusate sodium  •  LORazepam  •  magnesium sulfate **OR** magnesium sulfate **OR** magnesium sulfate  •  melatonin  •  Morphine  •  nitroglycerin  •  ondansetron **OR** ondansetron  •  potassium & sodium phosphates **OR** potassium & sodium phosphates  •  potassium chloride  •  potassium chloride  •  prochlorperazine  •  sodium chloride  •  sodium chloride      Assessment/Plan   Patient Active Problem List   Diagnosis   • Acute exacerbation of chronic obstructive pulmonary disease (COPD) (CMS/[facility])   • Essential hypertension   • Hyperlipidemia   • Anxiety disorder   • Congestive heart failure (CHF) (CMS/[facility])   • Depression   • Seasonal allergies   • Decubitus ulcer   • Chest pain   • Tobacco abuse     Plan:    Patient is feeling better.  Her echocardiogram showed preserved left ventricle function.  LVEF was 60 to 65%.  At this stage patient does not need any further cardiac work-up.  Patient can be discharged and follow-up with me if patient wishes to.    Alexis Vera MD  07/16/20  10:58

## 2020-07-16 NOTE — PLAN OF CARE
Problem: Patient Care Overview  Goal: Plan of Care Review  Outcome: Ongoing (interventions implemented as appropriate)  Flowsheets (Taken 7/16/2020 0901)  Plan of Care Reviewed With: patient  Outcome Summary: 58 y/o female with known COPD who has under Hospice care at home admitted on 7/13 due to worsening SOA and chest pain. Found to have lice and had undergone treatment. PMH includes hep C. At time of eval, pt required min A for supine to sit transfer but CGA for SPS bed <>BSC. Pt able to side step along side of bed using rw for support. Pt tremulous and has poor activity tolerance; could not tolerate conversing while using bathroom due to SOA. Seated spO2 87% on 2l/nc but could not get accurate reading in standing.; RPE 7/10. Patient appears to be near her baseline level of function, unsure if she will tolerate standing or gait activities. Will trial Physical Therapy while in hospital . PPE used gloves,mask, face shield, gown, shoe cover,head cover.

## 2020-07-16 NOTE — PROGRESS NOTES
Continued Stay Note  BRODY Abarca     Patient Name: Daphne Mccurdy  MRN: [id number]  Today's Date: 7/16/2020    Admit Date: 7/13/2020    Discharge Plan     Row Name 07/16/20 1113       Plan    Plan  DC Plan: Home with daughter, Nichole, [facility]. Annie, liaison, notified.     Patient/Family in Agreement with Plan  yes    Plan Comments  SW informed of pt's family dynamics & pt's continous change of d/c plan. SW spoke with pt & daughter separately, then together to make final decision. Pt was informed she has Passport Medicaid and would be placed in [place] if going to nursing home facility with hospice. SW explained hospice agency could work on changing Medicaid to [place] in time to place her locally if she ultimately decides she wants this. Pt verbalized to this SW that she wants to go home with her daughter, Nichole, at d/c. Notified [facility] lia[facility] (Annie). Secure chat sent to MD/RN/CM regarding d/c plan.      NATHAN Sparrow    Phone: [phone]  Cell: [phone]  Fax: [phone]  [email or web address]

## 2020-07-16 NOTE — PROGRESS NOTES
[facility] Medicine Services Daily Progress Note      Hospitalist Team  LOS 1 days      Patient Care Team:  Leobardo Sullivan MD as PCP - General (Family Medicine)    Patient Location: [facility]/      Subjective   Subjective     Chief Complaint / Subjective  Chief Complaint   Patient presents with   • Shortness of Breath         Brief Synopsis of Hospital Course/HPI  The patient is a 59-year-old female with end-stage chronic obstructive lung disease who presented with increasing shortness of breath.  The patient also has serious anxiety issues as well as chronic pain.  The patient was found to have lice and has been treated per protocol here at [facility] for this issue he remains in isolation for this issue.    Date:    7/16/2020  Today the patient is resting comfortably.  Nursing reports that the patient is constantly requesting narcotic and anxiety medications.  The patient was resting comfortably when I saw her.      Review of Systems   Constitution: Positive for malaise/fatigue.   HENT: Negative.    Eyes: Negative.    Cardiovascular: Negative.    Respiratory: Positive for cough and shortness of breath.    Endocrine: Negative.    Hematologic/Lymphatic: Negative.    Skin: Negative.         Bruising and skin tears with some excoriation likely secondary to the lice infestation.   Musculoskeletal: Negative.    Gastrointestinal: Negative.    Genitourinary: Negative.    Neurological: Negative.    Psychiatric/Behavioral: Negative.    Allergic/Immunologic: Negative.    All other systems reviewed and are negative.    Objective   Objective      Vital Signs  Temp:  [97.9 °F (36.6 °C)-98.3 °F (36.8 °C)] 98 °F (36.7 °C)  Heart Rate:  [] 114  Resp:  [16-20] 16  BP: ()/(63-77) 97/63  Oxygen Therapy  SpO2: 92 %  Pulse Oximetry Type: Intermittent  Device (Oxygen Therapy): nasal cannula  Device (Oxygen Therapy): nasal cannula  Flow (L/min): 2  Oxygen Concentration (%): 21  Flowsheet Rows       "First Filed Value   Admission Height  157.5 cm (62\") Documented at 07/13/2020 2218   Admission Weight  54.4 kg (120 lb) Documented at 07/13/2020 2218        Intake & Output (last 3 days)       07/14 0701 - 07/15 0700 07/15 0701 - 07/16 0700 07/16 0701 - 07/17 0700    P.O. 440 480     Total Intake(mL/kg) 440 (8.4) 480 (9.1)     Urine (mL/kg/hr) 0 (0) 700 (0.6)     Stool 0 0     Total Output 0 700     Net +440 -220            Urine Unmeasured Occurrence 1 x 1 x         Lines, Drains & Airways    Active LDAs     Name:   Placement date:   Placement time:   Site:   Days:    Peripheral IV 07/13/20 2233 Right Forearm   07/13/20 2233    Forearm   1    Peripheral IV 07/13/20 2233 Left Hand   07/13/20 2233    Hand   1              Physical Exam:    Physical Exam   Constitutional: She is oriented to person, place, and time. She appears well-developed and well-nourished. No distress.   HENT:   Head: Normocephalic and atraumatic.   Right Ear: External ear normal.   Left Ear: External ear normal.   Nose: Nose normal.   Mouth/Throat: Oropharynx is clear and moist. No oropharyngeal exudate.   Eyes: Pupils are equal, round, and reactive to light. Conjunctivae and EOM are normal. Right eye exhibits no discharge. Left eye exhibits no discharge. No scleral icterus.   Neck: Normal range of motion. No JVD present. No tracheal deviation present. No thyromegaly present.   Cardiovascular: Normal rate, regular rhythm, normal heart sounds and intact distal pulses. Exam reveals no gallop and no friction rub.   No murmur heard.  Pulmonary/Chest: Effort normal and breath sounds normal. No stridor. No respiratory distress. She has no wheezes. She has no rales. She exhibits no tenderness.   Patient has diminished breath sounds throughout.  There is no egophony, fremitus or dullness to percussion.  Equal breath sounds bilaterally and no active wheezing.   Abdominal: Soft. Bowel sounds are normal. She exhibits no distension and no mass. There " is no tenderness. There is no rebound and no guarding. No hernia.   Musculoskeletal: Normal range of motion. She exhibits no edema, tenderness or deformity.   Lymphadenopathy:     She has no cervical adenopathy.   Neurological: She is alert and oriented to person, place, and time. No cranial nerve deficit or sensory deficit. She exhibits normal muscle tone. Coordination normal.   Skin: Skin is warm and dry. No rash noted. She is not diaphoretic. No erythema.   The patient has a stage II decubitus ulcer that was present on admission in the gluteal area.  The patient also has very fragile skin with numerous sites of ecchymosis.  Patient also has some wounds on her legs.   Psychiatric: She has a normal mood and affect. Her behavior is normal.   Nursing note and vitals reviewed.       Wounds (last 24 hours)      LDA Wound     Row Name 07/16/20 1100 07/16/20 0311 07/15/20 2300       Wound 07/14/20 0206 medial perineum MASD (Moisture associated skin damage)    Wound - Properties Group Date first assessed: 07/14/20  -SH Time first assessed: 0206  -SH Present on Hospital Admission: Y  -SH Orientation: medial  -SH Location: perineum  -SH Primary Wound Type: MASD  -SS, per wound care,not a pressure injury  Stage, Pressure Injury: Stage 2  -SH    Dressing Appearance  open to air  -VW  open to air  -SS  open to air  -SS    Closure  Open to air  -VW  Open to air  -SS  Open to air  -SS    Base  pink  -VW  pink  -SS  pink  -SS    Periwound  intact;dry  -VW  --  --    Periwound Temperature  warm  -VW  --  --    Periwound Skin Turgor  soft  -VW  --  --    Drainage Amount  none  -VW  none  -SS  none  -SS    Periwound Care, Wound  barrier ointment applied  -VW  --  --       Wound 07/14/20 0210 Right anterior;lower;proximal leg Other (comment)    Wound - Properties Group Date first assessed: 07/14/20  -SH Time first assessed: 0210  -SH Present on Hospital Admission: Y  -SH Side: Right  -SH Orientation: anterior;lower;proximal  -SH  Location: leg  -SH Primary Wound Type: Other  -SH, ulcer     Dressing Appearance  dry;intact  -VW  dry;intact  -SS  dry;intact  -SS    Closure  MILDRED;Adhesive bandage  -VW  MILDRED;Adhesive bandage  -SS  MILDRED;Adhesive bandage  -SS    Base  non-blanchable  -VW  --  --    Drainage Characteristics/Odor  yellow  -VW  --  --    Drainage Amount  scant  -VW  scant  -SS  scant  -SS       Wound 07/14/20 0211 Left anterior;lower leg Other (comment)    Wound - Properties Group Date first assessed: 07/14/20  -SH Time first assessed: 0211  -SH Present on Hospital Admission: Y  -SH Side: Left  -SH Orientation: anterior;lower  -SH Location: leg  -SH Primary Wound Type: Other  -SH, ulcers     Dressing Appearance  --  dry;intact  -SS  dry;intact  -SS    Closure  --  Adhesive bandage  -SS  Adhesive bandage  -SS    Drainage Amount  --  scant  -SS  scant  -SS      User Key  (r) = Recorded By, (t) = Taken By, (c) = Cosigned By    Initials Name Provider Type     Silva Michael RN Registered Nurse    Yuliya Rebolledo LPN Licensed Nurse    Nhi Rondon LPN Licensed Nurse        Procedures:    Results Review:     I reviewed the patient's new clinical results.    Lab Results (last 24 hours)     Procedure Component Value Units Date/Time    BUN [[id number]]  (Normal) Collected:  07/16/20 0750    Specimen:  Blood Updated:  07/16/20 1713     BUN 9 mg/dL     Phosphorus [[id number]]  (Normal) Collected:  07/16/20 0750    Specimen:  Blood Updated:  07/16/20 0950     Phosphorus 2.5 mg/dL     Basic Metabolic Panel [[id number]]  (Abnormal) Collected:  07/16/20 0750    Specimen:  Blood Updated:  07/16/20 0933     Glucose 73 mg/dL      BUN --     Comment: Testing performed by alternate method        Creatinine 0.75 mg/dL      Sodium 140 mmol/L      Potassium 3.9 mmol/L      Chloride 92 mmol/L      CO2 38.0 mmol/L      Calcium 9.5 mg/dL      eGFR Non African Amer 79 mL/min/1.73      BUN/Creatinine Ratio --     Comment: Testing not performed         Anion Gap 10.0 mmol/L     Narrative:       GFR Normal >60  Chronic Kidney Disease <60  Kidney Failure <15      Magnesium [[id number]]  (Normal) Collected:  07/16/20 0750    Specimen:  Blood Updated:  07/16/20 0927     Magnesium 1.8 mg/dL     Scan Slide [[id number]] Collected:  07/16/20 0750    Specimen:  Blood Updated:  07/16/20 0900     Scan Slide --     Comment: See Manual Differential Results       Manual Differential [[id number]]  (Abnormal) Collected:  07/16/20 0750    Specimen:  Blood Updated:  07/16/20 0900     Neutrophil % 78.0 %      Lymphocyte % 19.0 %      Eosinophil % 1.0 %      Bands %  2.0 %      Neutrophils Absolute 9.92 10*3/mm3      Lymphocytes Absolute 2.36 10*3/mm3      Eosinophils Absolute 0.12 10*3/mm3      Anisocytosis Slight/1+     Poikilocytes Slight/1+     Stomatocytes Slight/1+     WBC Morphology Normal     Platelet Morphology Normal    CBC & Differential [[id number]] Collected:  07/16/20 0750    Specimen:  Blood Updated:  07/16/20 0900    Narrative:       The following orders were created for panel order CBC & Differential.  Procedure                               Abnormality         Status                     ---------                               -----------         ------                     CBC Auto Differential[[id number]]        Abnormal            Final result                 Please view results for these tests on the individual orders.    CBC Auto Differential [[id number]]  (Abnormal) Collected:  07/16/20 0750    Specimen:  Blood Updated:  07/16/20 0900     WBC 12.40 10*3/mm3      RBC 3.28 10*6/mm3      Hemoglobin 9.0 g/dL      Hematocrit 28.2 %      MCV 86.1 fL      MCH 27.4 pg      MCHC 31.9 g/dL      RDW 20.2 %      RDW-SD 60.4 fl      MPV 6.7 fL      Platelets 290 10*3/mm3     Wound Culture - Wound, Leg, Left [[id number]]  (Abnormal)  (Susceptibility) Collected:  07/14/20 0242    Specimen:  Wound from Leg, Left Updated:  07/16/20 0818     Wound Culture Heavy growth (4+) Providencia  stuartii      Light growth (2+) Staphylococcus aureus      Heavy growth (4+) Corynebacterium species     Comment:   No definitive guidelines. All are susceptible to vancomycin. Resistance to penicillins & cephalosporins does occur.        Gram Stain Rare (1+) WBCs per low power field      Few (2+) Gram positive cocci in pairs and clusters      Rare (1+) Gram positive bacilli    Susceptibility      Providencia stuartii     PINA     Amikacin Susceptible     Ampicillin Resistant     Ampicillin + Sulbactam Intermediate     Cefepime Susceptible     Ceftazidime Susceptible     Ceftriaxone Susceptible     Gentamicin Resistant     Levofloxacin Susceptible     Piperacillin + Tazobactam Susceptible     Tetracycline Resistant     Tobramycin Resistant     Trimethoprim + Sulfamethoxazole Susceptible                Susceptibility      Staphylococcus aureus     PINA     Clindamycin Susceptible     Erythromycin Susceptible     Inducible Clindamycin Resistance Negative     Oxacillin Susceptible     Penicillin G Resistant     Rifampin Susceptible     Tetracycline Susceptible     Trimethoprim + Sulfamethoxazole Susceptible     Vancomycin Susceptible                Susceptibility Comments     Providencia stuartii    Cefazolin sensitivity will not be reported for Enterobacteriaceae in non-urine isolates. If cefazolin is preferred, please call the microbiology lab to request an E-test.  With the exception of urinary-sourced infections, aminoglycosides should not be used as monotherapy.    Staphylococcus aureus    This isolate does not demonstrate inducible clindamycin resistance in vitro.    This isolate does not demonstrate inducible clindamycin resistance in vitro.               Blood Culture - Blood, Arm, Left [[id number]] Collected:  07/13/20 2254    Specimen:  Blood from Arm, Left Updated:  07/15/20 2300     Blood Culture No growth at 2 days    Blood Culture - Blood, Arm, Right [[id number]] Collected:  07/13/20 2231    Specimen:  Blood  from Arm, Right Updated:  07/15/20 2245     Blood Culture No growth at 2 days        No results found for: HGBA1C            No results found for: LIPASE  No results found for: CHOL, CHLPL, TRIG, HDL, LDL, LDLDIRECT    No results found for: INTRAOP, PREDX, FINALDX, COMDX    Microbiology Results (last 10 days)     Procedure Component Value - Date/Time    Respiratory Panel, PCR - Swab, Nasopharynx [[id number]]  (Normal) Collected:  07/14/20 0439    Lab Status:  Final result Specimen:  Swab from Nasopharynx Updated:  07/14/20 0812     ADENOVIRUS, PCR Not Detected     Coronavirus 229E Not Detected     Coronavirus HKU1 Not Detected     Coronavirus NL63 Not Detected     Coronavirus OC43 Not Detected     Human Metapneumovirus Not Detected     Human Rhinovirus/Enterovirus Not Detected     Influenza B PCR Not Detected     Parainfluenza Virus 1 Not Detected     Parainfluenza Virus 2 Not Detected     Parainfluenza Virus 3 Not Detected     Parainfluenza Virus 4 Not Detected     Bordetella pertussis pcr Not Detected     Influenza A H1 2009 PCR Not Detected     Chlamydophila pneumoniae PCR Not Detected     Mycoplasma pneumo by PCR Not Detected     Influenza A PCR Not Detected     Influenza A H3 Not Detected     Influenza A H1 Not Detected     RSV, PCR Not Detected    Narrative:       The coronavirus on the RVP is NOT COVID-19 and is NOT indicative of infection with COVID-19.     Wound Culture - Wound, Leg, Left [[id number]]  (Abnormal)  (Susceptibility) Collected:  07/14/20 0242    Lab Status:  Final result Specimen:  Wound from Leg, Left Updated:  07/16/20 0818     Wound Culture Heavy growth (4+) Providencia stuartii      Light growth (2+) Staphylococcus aureus      Heavy growth (4+) Corynebacterium species     Comment:   No definitive guidelines. All are susceptible to vancomycin. Resistance to penicillins & cephalosporins does occur.        Gram Stain Rare (1+) WBCs per low power field      Few (2+) Gram positive cocci in pairs  and clusters      Rare (1+) Gram positive bacilli    Susceptibility      Providencia stuartii     PINA     Amikacin Susceptible     Ampicillin Resistant     Ampicillin + Sulbactam Intermediate     Cefepime Susceptible     Ceftazidime Susceptible     Ceftriaxone Susceptible     Gentamicin Resistant     Levofloxacin Susceptible     Piperacillin + Tazobactam Susceptible     Tetracycline Resistant     Tobramycin Resistant     Trimethoprim + Sulfamethoxazole Susceptible                Susceptibility      Staphylococcus aureus     PINA     Clindamycin Susceptible     Erythromycin Susceptible     Inducible Clindamycin Resistance Negative     Oxacillin Susceptible     Penicillin G Resistant     Rifampin Susceptible     Tetracycline Susceptible     Trimethoprim + Sulfamethoxazole Susceptible     Vancomycin Susceptible                Susceptibility Comments     Providencia stuartii    Cefazolin sensitivity will not be reported for Enterobacteriaceae in non-urine isolates. If cefazolin is preferred, please call the microbiology lab to request an E-test.  With the exception of urinary-sourced infections, aminoglycosides should not be used as monotherapy.    Staphylococcus aureus    This isolate does not demonstrate inducible clindamycin resistance in vitro.    This isolate does not demonstrate inducible clindamycin resistance in vitro.               COVID-19 [facility] [employer] IN-HOUSE, Nasal Swab No Transport Media - Swab, Nasal Cavity [[id number]]  (Normal) Collected:  07/13/20 2331    Lab Status:  Final result Specimen:  Swab from Nasal Cavity Updated:  07/14/20 0016     COVID19 Not Detected    Narrative:       Fact sheet for providers: https://www.fda.gov/media/278593/download     Fact sheet for patients: https://www.fda.gov/media/529761/download    S. Pneumo Ag Urine or CSF - Urine, Urine, Clean Catch [[id number]]  (Abnormal) Collected:  07/13/20 2324    Lab Status:  Final result Specimen:  Urine, Clean Catch Updated:  07/14/20 0300      Strep Pneumo Ag Positive    Legionella Antigen, Urine - Urine, Urine, Clean Catch [[id number]]  (Normal) Collected:  07/13/20 2324    Lab Status:  Final result Specimen:  Urine, Clean Catch Updated:  07/14/20 0300     LEGIONELLA ANTIGEN, URINE Negative    Blood Culture - Blood, Arm, Left [[id number]] Collected:  07/13/20 2254    Lab Status:  Preliminary result Specimen:  Blood from Arm, Left Updated:  07/15/20 2300     Blood Culture No growth at 2 days    Blood Culture - Blood, Arm, Right [[id number]] Collected:  07/13/20 2231    Lab Status:  Preliminary result Specimen:  Blood from Arm, Right Updated:  07/15/20 2245     Blood Culture No growth at 2 days        ECG/EMG Results (most recent)     Procedure Component Value Units Date/Time    ECG 12 Lead [[id number]] Collected:  07/13/20 2225     Updated:  07/14/20 1614    Narrative:       HEART RATE= 107  bpm  RR Interval= 563  ms  DC Interval=   ms  P Horizontal Axis=   deg  P Front Axis=   deg  QRSD Interval= 95  ms  QT Interval= 347  ms  QRS Axis= 247  deg  T Wave Axis= 67  deg  - ABNORMAL ECG -  Atrial fibrillation  Ventricular premature complex  RVH with secondary repolarization abnrm  When compared with ECG of 08-Sep-2019 14:16:55,  Significant axis, voltage or hypertrophy change  Electronically Signed By: Leobardo Evans (Husam) 14-Jul-2020 16:10:40  Date and Time of Study: 2020-07-13 22:25:49    Adult Transthoracic Echo Complete W/ Cont if Necessary Per Protocol [[id number]] Collected:  07/14/20 1931     Updated:  07/15/20 1744     BSA 1.5 m^2      RVIDd 2.3 cm      IVSd 1.1 cm      IVSs 1.5 cm      LVIDd 3.6 cm      LVIDs 1.9 cm      LVPWd 1.1 cm      BH CV ECHO HONEY - LVPWS 1.3 cm      IVS/LVPW 0.98     FS 46.3 %      EDV(Teich) 53.1 ml      ESV(Teich) 11.4 ml      EF(Teich) 78.6 %      EDV(cubed) 45.2 ml      ESV(cubed) 7.0 ml      EF(cubed) 84.5 %      % IVS thick 38.6 %      % LVPW thick 18.0 %      LV mass(C)d 117.8 grams      LV mass(C)dI 79.2 grams/m^2      LV  mass(C)s 79.2 grams      LV mass(C)sI 53.2 grams/m^2      SV(Teich) 41.7 ml      SI(Teich) 28.0 ml/m^2      SV(cubed) 38.2 ml      SI(cubed) 25.7 ml/m^2      Ao root diam 2.9 cm      Ao root area 6.4 cm^2      ACS 1.9 cm      LVOT diam 1.8 cm      LVOT area 2.4 cm^2      EDV(MOD-sp4) 45.8 ml      ESV(MOD-sp4) 18.4 ml      EF(MOD-sp4) 59.8 %      SV(MOD-sp4) 27.4 ml      SI(MOD-sp4) 18.4 ml/m^2      Ao root area (BSA corrected) 1.9     LV Kim Vol (BSA corrected) 30.7 ml/m^2      LV Sys Vol (BSA corrected) 12.3 ml/m^2      Aortic R-R 0.15 sec      Aortic .6 BPM      MV E max ortega 63.9 cm/sec      MV A max ortega 118.8 cm/sec      MV E/A 0.54     MV V2 max 126.5 cm/sec      MV max PG 6.4 mmHg      MV V2 mean 74.1 cm/sec      MV mean PG 2.5 mmHg      MV V2 VTI 24.0 cm      MVA(VTI) 1.2 cm^2      MV dec slope 274.1 cm/sec^2      MV dec time 0.23 sec      Ao pk ortega 113.2 cm/sec      Ao max PG 5.1 mmHg      Ao max PG (full) 1.0 mmHg      Ao V2 mean 76.3 cm/sec      Ao mean PG 2.6 mmHg      Ao mean PG (full) 0.57 mmHg      Ao V2 VTI 12.0 cm      MARIA DE JESUS(I,A) 2.4 cm^2      MARIA DE JESUS(I,D) 2.4 cm^2      MARIA DE JESUS(V,A) 2.2 cm^2      MARIA DE JESUS(V,D) 2.2 cm^2      LV V1 max PG 4.1 mmHg      LV V1 mean PG 2.0 mmHg      LV V1 max 101.2 cm/sec      LV V1 mean 64.8 cm/sec      LV V1 VTI 11.6 cm      CO(Ao) 30.4 l/min      CI(Ao) 20.4 l/min/m^2      SV(Ao) 76.7 ml      SI(Ao) 51.5 ml/m^2      CO(LVOT) 11.3 l/min      CI(LVOT) 7.6 l/min/m^2      SV(LVOT) 28.5 ml      SI(LVOT) 19.1 ml/m^2      PA V2 max 83.2 cm/sec      PA max PG 2.8 mmHg      PA max PG (full) 0.31 mmHg      PA V2 mean 56.0 cm/sec      PA mean PG 1.5 mmHg      PA mean PG (full) -0.21 mmHg      PA V2 VTI 11.5 cm      PA acc time 0.04 sec      RV V1 max PG 2.5 mmHg      RV V1 mean PG 1.7 mmHg      RV V1 max 78.4 cm/sec      RV V1 mean 62.3 cm/sec      RV V1 VTI 10.4 cm      PA pr(Accel) 61.9 mmHg       CV ECHO HONEY - BZI_BMI 20.3 kilograms/m^2       CV ECHO HONEY - BSA(HAYCOCK) 1.5 m^2          CV ECHO HONEY - BZI_METRIC_WEIGHT 50.3 kg       CV ECHO HONEY - BZI_METRIC_HEIGHT 157.5 cm      EF(MOD-bp) 60.0 %      LA dimension(2D) 2.8 cm     Narrative:       · Left ventricular systolic function is normal.  · Estimated EF appears to be in the range of 61 - 65%.  · Left ventricular diastolic dysfunction (grade I) consistent with   impaired relaxation.              Results for orders placed during the hospital encounter of 07/13/20   Adult Transthoracic Echo Complete W/ Cont if Necessary Per Protocol    Narrative · Left ventricular systolic function is normal.  · Estimated EF appears to be in the range of 61 - 65%.  · Left ventricular diastolic dysfunction (grade I) consistent with   impaired relaxation.        Xr Chest 1 View    Result Date: 7/14/2020  Mildly limited study demonstrating mild linear subsegmental atelectasis and/or scarring in the left lung base.  Electronically Signed By-Yevgeniy Leary On:7/14/2020 7:18 AM This report was finalized on [id number] by  Yevgeniy Leary, .    Xrays, labs reviewed personally by physician.    Medication Review:   I have reviewed the patient's current medication list    Scheduled Meds    [START ON 7/17/2020] amLODIPine 2.5 mg Oral Daily   atorvastatin 20 mg Oral Daily   budesonide 0.5 mg Nebulization BID - RT   [START ON 7/17/2020] bumetanide 0.5 mg Oral Daily   cetirizine 10 mg Oral Daily   DULoxetine 30 mg Oral Daily   enoxaparin 40 mg Subcutaneous Q24H   ipratropium-albuterol 3 mL Nebulization Q4H - RT   Morphine 30 mg Oral Q12H   nicotine 1 patch Transdermal Daily   permethrin  Topical Once   piperacillin-tazobactam 3.375 g Intravenous Q8H   potassium chloride 20 mEq Oral Daily   predniSONE 40 mg Oral Daily With Breakfast   senna 1 tablet Oral BID   sodium chloride 10 mL Intravenous Q12H     Meds Infusions     Meds PRN  •  acetaminophen **OR** acetaminophen **OR** acetaminophen  •  Calcium Gluconate-NaCl **AND** calcium gluconate **AND** Calcium, Ionized  •   docusate sodium  •  LORazepam  •  magnesium sulfate **OR** magnesium sulfate **OR** magnesium sulfate  •  melatonin  •  Morphine  •  nitroglycerin  •  ondansetron **OR** ondansetron  •  potassium & sodium phosphates **OR** potassium & sodium phosphates  •  potassium chloride  •  potassium chloride  •  prochlorperazine  •  sodium chloride  •  sodium chloride    Assessment/Plan   Assessment/Plan     Active Hospital Problems    Diagnosis  POA   • Acute exacerbation of chronic obstructive pulmonary disease (COPD) (CMS/[facility]) [J44.1]  Yes   • Essential hypertension [I10]  Yes   • Hyperlipidemia [E78.5]  Yes   • Anxiety disorder [F41.9]  Yes   • Congestive heart failure (CHF) (CMS/[facility]) [I50.9]  Yes   • Depression [F32.9]  Yes   • Seasonal allergies [J30.2]  Yes   • Decubitus ulcer [L89.90]  Yes   • Chest pain [R07.9]  Yes   • Tobacco abuse [Z72.0]  Yes      Resolved Hospital Problems   No resolved problems to display.   MEDICAL DECISION MAKING COMPLEXITY BY PROBLEM:     1.  End stage COPD  -Continue home medications  -Patient had been on hospice prior to admission  -Currently not dyspneic on home oxygen settings    2.  Lice infestation  -Patient has been placed on isolation  -Patient has been treated per protocol  -Nursing is working with the patient's family to ensure that they clean their environment as they now want to take her back home with them.  -Nursing is providing education for the family as well and how to prevent a recurrence of this problem.    3.  Chest pain  -Patient had some mild elevations in troponin  -Patient's been evaluated by cardiology       -The patient was hospice and really does not qualify for any additional work-up at this time  -The elevations in troponin may be due to diaphragmatic spill    4.  Decubitus ulcer stage II present on admission  -Wound care has been consulted  -The patient also has leg wounds that have been cultured and are also being cared for by the wound care team.    5.   Hyperlipidemia  -Continue statin    6.  Anxiety  -Continue home doses of Ativan as needed  -Stable at present    7.  Depression  -Continue Cymbalta    8.  Seasonal allergies  -Continue Zyrtec      VTE Prophylaxis -   Mechanical Order History:     None      Pharmalogical Order History:     Ordered     Dose Route Frequency Stop    07/14/20 0217  enoxaparin (LOVENOX) syringe 40 mg      40 mg SC Every 24 Hours Scheduled --    07/14/20 0023  enoxaparin (LOVENOX) syringe 40 mg      40 mg SC Once 07/14/20 0035      Code Status -   Code Status and Medical Interventions:   Ordered at: 07/14/20 0130     Code Status:    CPR     Medical Interventions (Level of Support Prior to Arrest):    Full     Discharge Planning  SLP OP Goals     Row Name 07/16/20 0914          Time Calculation    PT Goal Re-Cert Due Date  07/30/20  -CR       User Key  (r) = Recorded By, (t) = Taken By, (c) = Cosigned By    Initials Name Provider Type    CR Reyes, Carmela, PT Physical Therapist        Destination - Selection Complete      Service Provider Request Status Selected Services Address Phone Number Fax Number    [facility] Selected Inpatient Hospice [address] [address] [phone]      Durable Medical Equipment      Service Provider Request Status Selected Services Address Phone Number Fax Number    [facility] Accepted N/A [address] [id number] [phone]      Dialysis/Infusion      Coordination has not been started for this encounter.      Home Medical Care      Coordination has not been started for this encounter.      Therapy      Coordination has not been started for this encounter.      Community Resources      Coordination has not been started for this encounter.        Electronically signed by Ying Louie MD, 07/16/20, 19:04.  [facility] Hospitalist Team

## 2020-07-16 NOTE — CONSULTS
Nutrition Services    Patient Name:  Daphne Mccurdy  YOB: 1960  MRN: [id number]  Admit Date:  7/13/2020    Progress note:    Nursing Admission Screen Consult received. Noted pt is dcing hospice care and is an expected nutritional decline. PO intakes are at 75%. Working remotely given concerns around COVID-19. Attempted to call pt this afternoon to see if there was anything we could offer to make her more comfortable, however she did not  the phone.     Will see prn.     Electronically signed by:  Jennifer Edouard RD  07/16/20 15:04

## 2020-07-16 NOTE — PLAN OF CARE
Pt refused therapy services this date.  States she is not feeling well and just got comfortable.  Will check back tomorrow.  PPE: mask, shield, gloves.    Winnie Hanna, OTR/L

## 2020-07-16 NOTE — THERAPY EVALUATION
Patient Name: Daphne Mccurdy  : 1960    MRN: [id number]                              Today's Date: 2020       Admit Date: 2020    Visit Dx:     ICD-10-CM ICD-9-CM   1. Acute exacerbation of chronic obstructive pulmonary disease (COPD) (CMS/[facility]) J44.1 491.21   2. Acute bronchitis, unspecified organism J20.9 466.0   3. Elevated troponin I level R79.89 790.6   4. Pleuritic chest pain R07.81 786.52     Patient Active Problem List   Diagnosis   • Acute exacerbation of chronic obstructive pulmonary disease (COPD) (CMS/[facility])   • Essential hypertension   • Hyperlipidemia   • Anxiety disorder   • Congestive heart failure (CHF) (CMS/[facility])   • Depression   • Seasonal allergies   • Decubitus ulcer   • Chest pain   • Tobacco abuse     Past Medical History:   Diagnosis Date   • Asthma    • Cellulitis    • COPD (chronic obstructive pulmonary disease) (CMS/[facility])    • GERD (gastroesophageal reflux disease)    • Hepatitis C    • Hyperlipidemia    • Hypertension    • Pneumonia      Past Surgical History:   Procedure Laterality Date   • MANDIBLE SURGERY     • TUBAL ABDOMINAL LIGATION       General Information     Row Name 20 0857          PT Evaluation Time/Intention    Document Type  evaluation  -CR     Mode of Treatment  physical therapy  -CR     Row Name 20 0857          General Information    Patient Profile Reviewed?  yes  -CR     Prior Level of Function  transfer;min assist: per pt report she primarily stays in recliner  -CR     Existing Precautions/Restrictions  oxygen therapy device and L/min  -CR     Barriers to Rehab  medically complex  -CR     Row Name 20 0857          Home Main Entrance    Number of Stairs, Main Entrance  none  -CR     Row Name 20 0857          Stairs Within Home, Primary    Number of Stairs, Within Home, Primary  none  -CR     Row Name 20 0857          Cognitive Assessment/Intervention- PT/OT    Orientation Status (Cognition)  person;oriented to  -CR     Row  Name 07/16/20 0857          Safety Issues, Functional Mobility    Impairments Affecting Function (Mobility)  endurance/activity tolerance;balance  -CR       User Key  (r) = Recorded By, (t) = Taken By, (c) = Cosigned By    Initials Name Provider Type    CR Reyes, Carmela, PT Physical Therapist        Mobility     Row Name 07/16/20 0859          Bed Mobility Assessment/Treatment    Bed Mobility Assessment/Treatment  supine-sit;sit-supine  -CR     Supine-Sit [place] (Bed Mobility)  minimum assist (75% patient effort)  -CR     Sit-Supine [place] (Bed Mobility)  supervision  -CR     Assistive Device (Bed Mobility)  bed rails  -CR     Row Name 07/16/20 0859          Bed-Chair Transfer    Bed-Chair [place] (Transfers)  contact guard;verbal cues  -CR     Assistive Device (Bed-Chair Transfers)  walker, front-wheeled  -CR     Row Name 07/16/20 0859          Sit-Stand Transfer    Sit-Stand [place] (Transfers)  minimum assist (75% patient effort)  -CR     Assistive Device (Sit-Stand Transfers)  walker, front-wheeled  -CR     Row Name 07/16/20 0859          Gait/Stairs Assessment/Training    Gait/Stairs Assessment/Training  gait/ambulation assistive device  -CR     [place] Level (Gait)  contact guard  -CR     Assistive Device (Gait)  walker, front-wheeled  -CR     Distance in Feet (Gait)  4 ft  - side stepping along side of bed   -CR     Deviations/Abnormal Patterns (Gait)  gait speed decreased;festinating/shuffling  -CR       User Key  (r) = Recorded By, (t) = Taken By, (c) = Cosigned By    Initials Name Provider Type    CR Reyes, Carmela, PT Physical Therapist        Obj/Interventions     Row Name 07/16/20 0901          General ROM    GENERAL ROM COMMENTS  AROM BLE wfl   -CR     Row Name 07/16/20 0901          MMT (Manual Muscle Testing)    General MMT Comments  grossly 3+/5  -CR     Row Name 07/16/20 0901          Static Sitting Balance    Level of [place] (Unsupported Sitting, Static Balance)   conditional independence  -CR     Sitting Position (Unsupported Sitting, Static Balance)  sitting on edge of bed on BSC  -CR     Time Able to Maintain Position (Unsupported Sitting, Static Balance)  1 to 2 minutes  -CR     Row Name 07/16/20 0901          Dynamic Sitting Balance    Level of [place], Reaches Outside Midline (Sitting, Dynamic Balance)  supervision  -CR     Sitting Position, Reaches Outside Midline (Sitting, Dynamic Balance)  sitting on edge of bed  -CR     Row Name 07/16/20 0901          Static Standing Balance    Level of [place] (Supported Standing, Static Balance)  supervision  -CR     Time Able to Maintain Position (Supported Standing, Static Balance)  30 to 45 seconds  -CR     Assistive Device Utilized (Supported Standing, Static Balance)  walker, rolling  -CR     Row Name 07/16/20 0901          Dynamic Standing Balance    Level of [place], Reaches Outside Midline (Standing, Dynamic Balance)  contact guard assist  -CR     Time Able to Maintain Position, Reaches Outside Midline (Standing, Dynamic Balance)  45 to 60 seconds  -CR     Assistive Device Utilized (Supported Standing, Dynamic Balance)  walker, rolling  -CR     Row Name 07/16/20 0901          Sensory Assessment/Intervention    Sensory General Assessment  no sensation deficits identified  -CR       User Key  (r) = Recorded By, (t) = Taken By, (c) = Cosigned By    Initials Name Provider Type    CR Reyes, Carmela, PT Physical Therapist        Goals/Plan     Row Name 07/16/20 0910          Gait Training Goal 1 (PT)    Activity/Assistive Device (Gait Training Goal 1, PT)  gait (walking locomotion);assistive device use;increase endurance/gait distance;increase energy conservation;walker, rolling  -CR     [place] Level (Gait Training Goal 1, PT)  contact guard assist  -CR     Distance (Gait Goal 1, PT)  20 ft  -CR     Time Frame (Gait Training Goal 1, PT)  2 weeks  -CR     Row Name 07/16/20 0910          ROM Goal 1 (PT)    ROM  Goal 1 (PT)  tolerate LE ex with spO2 >90% and RPE 5/10  -CR     Time Frame (ROM Goal 1, PT)  2 weeks  -CR       User Key  (r) = Recorded By, (t) = Taken By, (c) = Cosigned By    Initials Name Provider Type    CR Reyes, Carmela, PT Physical Therapist        Clinical Impression     Row Name 07/16/20 0901          Pain Assessment    Additional Documentation  Pain Scale: Numbers Pre/Post-Treatment (Group)  -CR     Row Name 07/16/20 0901          Pain Scale: Numbers Pre/Post-Treatment    Pain Scale: Numbers, Pretreatment  0/10 - no pain  -CR     Pain Scale: Numbers, Post-Treatment  0/10 - no pain  -CR     Row Name 07/16/20 0901          Plan of Care Review    Plan of Care Reviewed With  patient  -CR     Outcome Summary  58 y/o female with known COPD who has under Hospice care at home admitted on 7/13 due to worsening SOA and chest pain. Found to have lice and had undergone treatment. PMH includes hep C. At time of eval, pt required min A for supine to sit transfer but CGA for SPS bed <>BSC. Pt able to side step along side of bed using rw for support. Pt tremulous and has poor activity tolerance; could not tolerate conversing while using bathroom due to SOA. Seated spO2 87% on 2l/nc but could not get accurate reading in standing.; RPE 7/10. Patient appears to be near her baseline level of function, unsure if she will tolerate standing or gait activities. Will trial Physical Therapy while in hospital . PPE used gloves,mask, face shield, gown, shoe cover,head cover.   -CR     Row Name 07/16/20 0901          Physical Therapy Clinical Impression    Criteria for Skilled Interventions Met (PT Clinical Impression)  treatment indicated  -CR     Rehab Potential (PT Clinical Summary)  fair, will monitor progress closely  -CR     Predicted Duration of Therapy (PT)  dc  -CR     Row Name 07/16/20 0901          Vital Signs    Pre SpO2 (%)  95  -CR     O2 Delivery Pre Treatment  supplemental O2  -CR     Intra SpO2 (%)  87  -CR     O2  Delivery Intra Treatment  supplemental O2 2l/nc  -CR     Row Name 07/16/20 0901          Positioning and Restraints    Pre-Treatment Position  in bed  -CR     Post Treatment Position  bed  -CR     In Bed  notified nsg;fowlers;call light within reach;exit alarm on  -CR       User Key  (r) = Recorded By, (t) = Taken By, (c) = Cosigned By    Initials Name Provider Type    CR Reyes, Carmela, PT Physical Therapist        Outcome Measures     Row Name 07/16/20 0912          How much help from another person do you currently need...    Turning from your back to your side while in flat bed without using bedrails?  3  -CR     Moving from lying on back to sitting on the side of a flat bed without bedrails?  3  -CR     Moving to and from a bed to a chair (including a wheelchair)?  3  -CR     Standing up from a chair using your arms (e.g., wheelchair, bedside chair)?  3  -CR     Climbing 3-5 steps with a railing?  2  -CR     To walk in hospital room?  2  -CR     AM-PAC 6 Clicks Score (PT)  16  -CR     Row Name 07/16/20 0912          Functional Assessment    Outcome Measure Options  AM-PAC 6 Clicks Basic Mobility (PT)  -CR       User Key  (r) = Recorded By, (t) = Taken By, (c) = Cosigned By    Initials Name Provider Type    CR Reyes, Carmela, PT Physical Therapist        Physical Therapy Education                 Title: PT OT SLP Therapies (In Progress)     Topic: Physical Therapy (In Progress)     Point: Mobility training (Done)     Description:   Instruct learner(s) on safety and technique for assisting patient out of bed, chair or wheelchair.  Instruct in the proper use of assistive devices, such as walker, crutches, cane or brace.              Patient Friendly Description:   It's important to get you on your feet again, but we need to do so in a way that is safe for you. Falling has serious consequences, and your personal safety is the most important thing of all.        When it's time to get out of bed, one of us or a  family member will sit next to you on the bed to give you support.     If your doctor or nurse tells you to use a walker, crutches, a cane, or a brace, be sure you use it every time you get out of bed, even if you think you don't need it.    Learning Progress Summary           Patient Acceptance, SHARRON, VU by CR at 7/16/2020 0913                   Point: Home exercise program (Not Started)     Description:   Instruct learner(s) on appropriate technique for monitoring, assisting and/or progressing patient with therapeutic exercises and activities.              Learner Progress:   Not documented in this visit.          Point: Body mechanics (Not Started)     Description:   Instruct learner(s) on proper positioning and spine alignment for patient and/or caregiver during mobility tasks and/or exercises.              Learner Progress:   Not documented in this visit.          Point: Precautions (Not Started)     Description:   Instruct learner(s) on prescribed precautions during mobility and gait tasks              Learner Progress:   Not documented in this visit.                      User Key     Initials Effective Dates Name Provider Type Discipline    CR 03/01/19 -  Reyes, Carmela, PT Physical Therapist PT              PT Recommendation and Plan  Planned Therapy Interventions (PT Eval): gait training, home exercise program, patient/family education, strengthening, bed mobility training, balance training, transfer training  Plan of Care Reviewed With: patient  Outcome Summary: 58 y/o female with known COPD who has under Hospice care at home admitted on 7/13 due to worsening SOA and chest pain. Found to have lice and had undergone treatment. PMH includes hep C. At time of eval, pt required min A for supine to sit transfer but CGA for SPS bed <>BSC. Pt able to side step along side of bed using rw for support. Pt tremulous and has poor activity tolerance; could not tolerate conversing while using bathroom due to SOA. Seated  spO2 87% on 2l/nc but could not get accurate reading in standing.; RPE 7/10. Patient appears to be near her baseline level of function, unsure if she will tolerate standing or gait activities. Will trial Physical Therapy while in hospital . PPE used gloves,mask, face shield, gown, shoe cover,head cover.      Time Calculation:   PT Charges     Row Name 07/16/20 0914             Time Calculation    Start Time  0810  -CR      Stop Time  0845  -CR      Time Calculation (min)  35 min  -CR      PT Received On  07/16/20  -CR      PT - Next Appointment  07/17/20  -CR      PT Goal Re-Cert Due Date  07/30/20  -CR         Time Calculation- PT    Total Timed Code Minutes- PT  0 minute(s)  -CR        User Key  (r) = Recorded By, (t) = Taken By, (c) = Cosigned By    Initials Name Provider Type    CR Reyes, Carmela, PT Physical Therapist        Therapy Charges for Today     Code Description Service Date Service Provider Modifiers Qty    [id number] HC PT EVAL HIGH COMPLEXITY 3 7/16/2020 Reyes, Carmela, PT GP 1          PT G-Codes  Outcome Measure Options: AM-PAC 6 Clicks Basic Mobility (PT)  AM-PAC 6 Clicks Score (PT): 16    Carmela Reyes, PT  7/16/2020

## 2020-07-17 NOTE — PROGRESS NOTES
Pulmonary Disease Educator Note:     All pulmonary notes and respiratory medications have been reviewed.  7/14/20 Pt was willing to PLB with me but was not interested in other DE exercises. She states that she is in too much pain, Pt is currently under Hospice care for End-Stage COPD but is currently a full code.     Per CM Note: DC Plan: Home with daughter, Nichole, with 24/7 care & [facility] hosp[facility].

## 2020-07-17 NOTE — DISCHARGE SUMMARY
[facility] ER Medicine Services  DISCHARGE SUMMARY      Prepared For PCP:  Leobardo Sullivan MD    Patient Name: Daphne Mccurdy  : 1960  MRN: [id number]  Date of Admission:   2020    Date of Discharge:  2020    Length of stay:  LOS: 2 days     Hospital Course     Presenting Problem:   Pleuritic chest pain [R07.81]  Acute exacerbation of chronic obstructive pulmonary disease (COPD) (CMS/Formerly [facility]) [J44.1]  Elevated troponin I level [R79.89]  Acute bronchitis, unspecified organism [J20.9]  Acute exacerbation of chronic obstructive pulmonary disease (COPD) (CMS/Formerly [facility]) [J44.1]    Active Hospital Problems    Diagnosis  POA   • Acute exacerbation of chronic obstructive pulmonary disease (COPD) (CMS/Formerly [facility]) [J44.1]  Yes     Priority: High   • Tobacco abuse [Z72.0]  Yes     Priority: High   • Essential hypertension [I10]  Yes     Priority: Medium   • Hyperlipidemia [E78.5]  Yes     Priority: Medium   • Anxiety disorder [F41.9]  Yes     Priority: Medium   • Congestive heart failure (CHF) (CMS/Formerly [facility]) [I50.9]  Yes     Priority: Medium   • Depression [F32.9]  Yes     Priority: Medium   • Chest pain [R07.9]  Yes     Priority: Medium   • Seasonal allergies [J30.2]  Yes     Priority: Low   • Decubitus ulcer [L89.90]  Yes     Priority: Low      Resolved Hospital Problems   No resolved problems to display.     Hospital Course:  Daphne Mccurdy is a 59 y.o. female with end-stage chronic obstructive pulmonary disease on home hospice who presented with increasing shortness of breath.  The patient had a positive strep pneumo antigen.  The patient also had a wound culture which grew out Providencia stuartii, staph aureus and corynebacterium species.  Both the patient's wound and her sputum are susceptible to Bactrim.  The patient was continued on prednisone 20 mg twice daily which is what she was on prior to admission.  The patient was kept on her home regimen of anxiety and pain medications as the patient  has chronic pain syndrome in addition to her end-stage chronic obstructive pulmonary disease.  The patient was found to have a lice infestation all over her personal effects on her body.  The patient was treated per protocol.  The family was notified and nursing did an excellent job of getting the family about how to eradicate this problem.  Case management was also involved in the patient's discharge planning and has made arrangements for the things that the patient needs in order to transition to her home environment well.    The patient was a full code at the hospital throughout her stay.  She is discharged as a full code.  The patient had some cultures that had not completely finalized prior to discharge.  The patient was on a regular diet.  The patient already had home oxygen and DME.  The patient's medications are as listed in that section of this report.  The patient should follow-up with her primary care provider in 3 to 5 days and with her pulmonologist in 1 to 2 weeks.  \[facility] [place] [facility]\"" is aware that the patient will be coming home and they will be providing her hospice care.  The patient is discharged in stable condition.    Recommendation for Outpatient Providers:     Reasons For Change In Medications and Indications for New Medications:    Day of Discharge     HPI:   The patient was resting comfortably upon my arrival.  She is anxious to go back home.    Vital Signs:   Temp:  [97.5 °F (36.4 °C)-98 °F (36.7 °C)] 98 °F (36.7 °C)  Heart Rate:  [] 110  Resp:  [16-20] 18  BP: (120-133)/(78-87) 120/87     Physical Exam:  Physical Exam   Constitutional: She is oriented to person, place, and time. She appears well-developed. No distress.   The patient has rather cushingoid in her appearance.  She has a lot of bruising and skin tears.  The patient's findings are likely due to her use of steroids and severity of her overall illness.   HENT:   Head: Normocephalic and atraumatic.   Right Ear: External ear normal.      Left Ear: External ear normal.   Nose: Nose normal.   Mouth/Throat: Oropharynx is clear and moist. No oropharyngeal exudate.   Eyes: Pupils are equal, round, and reactive to light. Conjunctivae and EOM are normal. Right eye exhibits no discharge. Left eye exhibits no discharge. No scleral icterus.   Neck: Normal range of motion. No JVD present. No tracheal deviation present. No thyromegaly present.   Cardiovascular: Normal rate, regular rhythm, normal heart sounds and intact distal pulses. Exam reveals no gallop and no friction rub.   No murmur heard.  Pulmonary/Chest: Effort normal. No stridor. No respiratory distress. She has no wheezes. She has no rales. She exhibits no tenderness.   Decreased breath sounds throughout without any active wheezing.   Abdominal: Soft. Bowel sounds are normal. She exhibits no distension and no mass. There is no tenderness. There is no rebound and no guarding. No hernia.   Musculoskeletal: Normal range of motion. She exhibits no edema, tenderness or deformity.   Lymphadenopathy:     She has no cervical adenopathy.   Neurological: She is alert and oriented to person, place, and time. No cranial nerve deficit or sensory deficit. She exhibits normal muscle tone. Coordination normal.   Skin: Skin is warm and dry. No rash noted. She is not diaphoretic. No erythema.   Psychiatric: She has a normal mood and affect. Her behavior is normal.   Nursing note and vitals reviewed.      Pertinent  and/or Most Recent Results     Results from last 7 days   Lab Units 07/17/20  1012 07/16/20  0750 07/15/20  0355 07/14/20  1415 07/14/20  0432 07/13/20  2231   WBC 10*3/mm3 14.70* 12.40* 10.70  --   --  10.60   HEMOGLOBIN g/dL 10.2* 9.0* 8.2*  --   --  11.3*   HEMATOCRIT % 32.1* 28.2* 25.2*  --   --  34.3   PLATELETS 10*3/mm3 291 290 253  --   --  299   SODIUM mmol/L 141 140 138  --   --  137   POTASSIUM mmol/L 3.3* 3.9 4.0 4.6 3.3* 2.4*   CHLORIDE mmol/L 91* 92* 90*  --   --  81*   CO2 mmol/L 34.0*  38.0* 36.0*  --   --  42.0*   BUN   --  9 19  --   --  17   CREATININE mg/dL 1.06* 0.75 0.92  --   --  1.04*   GLUCOSE mg/dL 108* 73 140*  --   --  177*   CALCIUM mg/dL 9.5 9.5 9.2  --   --  9.2     Results from last 7 days   Lab Units 07/13/20  2231   BILIRUBIN mg/dL 0.5   ALK PHOS U/L 103   ALT (SGPT) U/L 32   AST (SGOT) U/L 27           Invalid input(s): TG, LDLCALC, LDLREALC  Results from last 7 days   Lab Units 07/14/20  1415 07/14/20  1018 07/14/20  0432 07/13/20  2237 07/13/20 2231   TSH uIU/mL  --   --  0.411  --   --    PROBNP pg/mL  --   --   --   --  453.3   TROPONIN T ng/mL 0.021 0.026 0.033*  --  0.042*   PROCALCITONIN ng/mL  --   --   --   --  0.09   LACTATE mmol/L  --   --   --  2.0  --        Brief Urine Lab Results  (Last result in the past 365 days)      Color   Clarity   Blood   Leuk Est   Nitrite   Protein   CREAT   Urine HCG        07/13/20 2324 Yellow Clear Negative Negative Negative Negative               Microbiology Results Abnormal     Procedure Component Value - Date/Time    Blood Culture - Blood, Arm, Left [[id number]] Collected:  07/13/20 2254    Lab Status:  Preliminary result Specimen:  Blood from Arm, Left Updated:  07/16/20 2301     Blood Culture No growth at 3 days    Blood Culture - Blood, Arm, Right [[id number]] Collected:  07/13/20 2231    Lab Status:  Preliminary result Specimen:  Blood from Arm, Right Updated:  07/16/20 2245     Blood Culture No growth at 3 days    Wound Culture - Wound, Leg, Left [[id number]]  (Abnormal)  (Susceptibility) Collected:  07/14/20 0242    Lab Status:  Final result Specimen:  Wound from Leg, Left Updated:  07/16/20 0818     Wound Culture Heavy growth (4+) Providencia stuartii      Light growth (2+) Staphylococcus aureus      Heavy growth (4+) Corynebacterium species     Comment:   No definitive guidelines. All are susceptible to vancomycin. Resistance to penicillins & cephalosporins does occur.        Gram Stain Rare (1+) WBCs per low power field       Few (2+) Gram positive cocci in pairs and clusters      Rare (1+) Gram positive bacilli    Susceptibility      Providencia stuartii     PINA     Amikacin Susceptible     Ampicillin Resistant     Ampicillin + Sulbactam Intermediate     Cefepime Susceptible     Ceftazidime Susceptible     Ceftriaxone Susceptible     Gentamicin Resistant     Levofloxacin Susceptible     Piperacillin + Tazobactam Susceptible     Tetracycline Resistant     Tobramycin Resistant     Trimethoprim + Sulfamethoxazole Susceptible                Susceptibility      Staphylococcus aureus     PINA     Clindamycin Susceptible     Erythromycin Susceptible     Inducible Clindamycin Resistance Negative     Oxacillin Susceptible     Penicillin G Resistant     Rifampin Susceptible     Tetracycline Susceptible     Trimethoprim + Sulfamethoxazole Susceptible     Vancomycin Susceptible                Susceptibility Comments     Providencia stuartii    Cefazolin sensitivity will not be reported for Enterobacteriaceae in non-urine isolates. If cefazolin is preferred, please call the microbiology lab to request an E-test.  With the exception of urinary-sourced infections, aminoglycosides should not be used as monotherapy.    Staphylococcus aureus    This isolate does not demonstrate inducible clindamycin resistance in vitro.    This isolate does not demonstrate inducible clindamycin resistance in vitro.               Respiratory Panel, PCR - Swab, Nasopharynx [[id number]]  (Normal) Collected:  07/14/20 0439    Lab Status:  Final result Specimen:  Swab from Nasopharynx Updated:  07/14/20 0812     ADENOVIRUS, PCR Not Detected     Coronavirus 229E Not Detected     Coronavirus HKU1 Not Detected     Coronavirus NL63 Not Detected     Coronavirus OC43 Not Detected     Human Metapneumovirus Not Detected     Human Rhinovirus/Enterovirus Not Detected     Influenza B PCR Not Detected     Parainfluenza Virus 1 Not Detected     Parainfluenza Virus 2 Not Detected      Parainfluenza Virus 3 Not Detected     Parainfluenza Virus 4 Not Detected     Bordetella pertussis pcr Not Detected     Influenza A H1 2009 PCR Not Detected     Chlamydophila pneumoniae PCR Not Detected     Mycoplasma pneumo by PCR Not Detected     Influenza A PCR Not Detected     Influenza A H3 Not Detected     Influenza A H1 Not Detected     RSV, PCR Not Detected    Narrative:       The coronavirus on the RVP is NOT COVID-19 and is NOT indicative of infection with COVID-19.     S. Pneumo Ag Urine or CSF - Urine, Urine, Clean Catch [[id number]]  (Abnormal) Collected:  07/13/20 2324    Lab Status:  Final result Specimen:  Urine, Clean Catch Updated:  07/14/20 0300     Strep Pneumo Ag Positive    Legionella Antigen, Urine - Urine, Urine, Clean Catch [[id number]]  (Normal) Collected:  07/13/20 2324    Lab Status:  Final result Specimen:  Urine, Clean Catch Updated:  07/14/20 0300     LEGIONELLA ANTIGEN, URINE Negative    COVID-19 [facility] [employer] IN-HOUSE, Nasal Swab No Transport Media - Swab, Nasal Cavity [[id number]]  (Normal) Collected:  07/13/20 2331    Lab Status:  Final result Specimen:  Swab from Nasal Cavity Updated:  07/14/20 0016     COVID19 Not Detected    Narrative:       Fact sheet for providers: https://www.fda.gov/media/042445/download     Fact sheet for patients: https://www.fda.gov/media/044710/download        Xr Chest 1 View    Result Date: 7/14/2020  Impression: Mildly limited study demonstrating mild linear subsegmental atelectasis and/or scarring in the left lung base.  Electronically Signed By-Yevgeniy Leary On:7/14/2020 7:18 AM This report was finalized on [id number] by  Yevgeniy Leary, .          Results for orders placed during the hospital encounter of 07/13/20   Adult Transthoracic Echo Complete W/ Cont if Necessary Per Protocol    Narrative · Left ventricular systolic function is normal.  · Estimated EF appears to be in the range of 61 - 65%.  · Left ventricular diastolic dysfunction (grade I)  consistent with   impaired relaxation.        Test Results Pending at Discharge   Order Current Status    BUN In process    Blood Culture - Blood, Arm, Left Preliminary result    Blood Culture - Blood, Arm, Right Preliminary result        Procedures Performed     Consults:   Consults     Date and Time Order Name Status Description    7/14/2020 1024 Inpatient Cardiology Consult Completed     7/14/2020 0023 Hospitalist (on-call MD unless specified) Completed         Discharge Details        Discharge Medications      New Medications      Instructions Start Date   sulfamethoxazole-trimethoprim 800-160 MG per tablet  Commonly known as:  Bactrim DS   160 mg, Oral, 2 Times Daily         Changes to Medications      Instructions Start Date   amLODIPine 2.5 MG tablet  Commonly known as:  NORVASC  What changed:    · medication strength  · how much to take   2.5 mg, Oral, Daily   Start Date:  July 18, 2020     bumetanide 0.5 MG tablet  Commonly known as:  BUMEX  What changed:    · medication strength  · how much to take   0.5 mg, Oral, Daily   Start Date:  July 18, 2020        Continue These Medications      Instructions Start Date   atorvastatin 20 MG tablet  Commonly known as:  LIPITOR   20 mg, Oral, Daily      docusate sodium 100 MG capsule  Commonly known as:  COLACE   100 mg, Oral, 2 Times Daily      DULoxetine 30 MG capsule  Commonly known as:  CYMBALTA   30 mg, Oral, Daily      loratadine 10 MG tablet  Commonly known as:  CLARITIN   10 mg, Oral, Daily      LORazepam 1 MG tablet  Commonly known as:  ATIVAN   1 mg, Oral, Every 6 Hours PRN      melatonin 1 MG tablet   6 mg, Oral, Nightly      Morphine 15 MG tablet  Commonly known as:  MSIR   15 mg, Oral, Every 6 Hours PRN      Morphine 30 MG 12 hr tablet  Commonly known as:  MS CONTIN   30 mg, Oral, 2 Times Daily      potassium chloride 20 MEQ packet  Commonly known as:  KLOR-CON   20 mEq, Oral, Daily      predniSONE 10 MG tablet  Commonly known as:  DELTASONE   20 mg,  Oral, 2 Times Daily      prochlorperazine 10 MG tablet  Commonly known as:  COMPAZINE   10 mg, Oral, Every 6 Hours PRN      senna 8.6 MG tablet  Commonly known as:  SENOKOT   1 tablet, Oral, 2 Times Daily           No Known Allergies    Discharge Disposition:  Home-Health Care [facility]    Diet:  Hospital:  Diet Order   Procedures   • Diet Cardiac, Texture; Healthy Heart; Soft to Chew     Discharge Activity:     CODE STATUS:    Code Status and Medical Interventions:   Ordered at: 07/14/20 0130     Code Status:    CPR     Medical Interventions (Level of Support Prior to Arrest):    Full     Follow-up Appointments  No future appointments.    Condition on Discharge:      Stable    This patient has been examined wearing appropriate Personal Protective Equipment and discussed with hospital infection control department. 07/17/20    Electronically signed by Ying Louie MD, 07/17/20, 1:24 PM.    Time: I spent  40 minutes on this discharge activity which included face-to-face encounter with the patient/reviewing the data in the system/coordination of the care with the nursing staff as well as consultants/documentation/entering orders.

## 2020-07-17 NOTE — PROGRESS NOTES
Continued Stay Note   Tevin     Patient Name: Daphne Mccurdy  MRN: [id number]  Today's Date: 7/17/2020    Admit Date: 7/13/2020    Discharge Plan     Row Name 07/17/20 1058       Plan    Plan Comments  GRADY spoke with [facility] SW & RN (Swati) via phone between 7/16 & 7/17. Per report from daughter, Nichole, lice has been treated in the home. Daughter is aware of concern & will now be monitoring. Spoke to [facility] RN who reports that Nichole is an excellent caregiver in the home (assists with ADLs, manages medications, primary support, etc.). [facility] recently initiated CNA services which will begin in the home upon return. GRADY learned more about family dynamic & was informed by hospice agency that Nichole is the primary caregiver for pt (with some assistance from her spouse & pt's significant other), while other daughter (Jill) only becomes involved when pt is in hospital, otherwise not involved. [facility] RN reports since pt admitted, family has moved to a new home down the street as they renters and reports that she doesn't believe the lice was a recurrent situation. Discharge plan remains home with hospice services & family who provided 24/7 care. Hospice agency to assist with process for switching Medicaid from [place] to [place] to allow for LTC placement in [facility] if the need arises. Possible d/c today vs. weekend, hospice agency informed. MD updated on family situation via secure chat.      NATHAN Sparrow    Phone: [phone]  Cell: [phone]  Fax: [phone]  [email or web address]

## 2020-07-17 NOTE — PROGRESS NOTES
Continued Stay Note  [facility]     Patient Name: Daphne Mccurdy  MRN: [id number]  Today's Date: 7/17/2020    Admit Date: 7/13/2020    Discharge Plan     Row Name 07/17/20 1140       Plan    Plan  Home with daughter (Nichole), and [facility] (per SW note from [facility] liason).     Plan Comments  Anticipate discharge today.        Expected Discharge Date and Time     Expected Discharge Date Expected Discharge Time    Jul 17, 2020               Anna Naegele RN Case Manager  [facility]  [address] [place]  [address]   [phone]  office  [phone]  fax  [email or web address]

## 2020-07-17 NOTE — PROGRESS NOTES
LOS: 2 days   Admiting Physician- Ying Louie MD    Reason For Followup:    Shortness of breath  Elevated troponin  Chest pain  COPD exacerbation    Subjective     Patient is feeling better. Denies worsening chest pain or dyspnea    Objective     No leg edema noted    Review of Systems:   Review of Systems   Constitution: Negative for chills and fever.   HENT: Negative for ear discharge and nosebleeds.    Eyes: Negative for discharge and redness.   Cardiovascular: Negative for chest pain, orthopnea, palpitations, paroxysmal nocturnal dyspnea and syncope.   Respiratory: Positive for shortness of breath. Negative for cough and wheezing.    Endocrine: Negative for heat intolerance.   Skin: Positive for color change. Negative for rash.   Musculoskeletal: Positive for arthritis and joint pain. Negative for myalgias.   Gastrointestinal: Negative for abdominal pain, melena, nausea and vomiting.   Genitourinary: Negative for dysuria and hematuria.   Neurological: Negative for dizziness, light-headedness, numbness and tremors.   Psychiatric/Behavioral: Negative for depression. The patient is not nervous/anxious.          Vital Signs  Vitals:    07/17/20 0736 07/17/20 1111 07/17/20 1142 07/17/20 1147   BP:  120/87     BP Location:  Left arm     Patient Position:  Lying     Pulse: 90 118 105 110   Resp: 18 16 18 18   Temp:  98 °F (36.7 °C)     TempSrc:  Oral     SpO2:  95% 97%    Weight:       Height:         Wt Readings from Last 1 Encounters:   07/17/20 53.4 kg (117 lb 11.6 oz)     No intake or output data in the 24 hours ending 07/17/20 1424  Physical Exam:  Physical Exam   Constitutional: She is oriented to person, place, and time.   Patient is frail and weak   HENT:   Head: Normocephalic and atraumatic.   Eyes: Right eye exhibits no discharge. No scleral icterus.   Neck: No thyromegaly present.   Cardiovascular: Normal rate, regular rhythm and normal heart sounds. Exam reveals no gallop and no friction rub.   No  murmur heard.  Pulmonary/Chest: Effort normal and breath sounds normal. No respiratory distress. She has no wheezes. She has no rales.   Abdominal: There is no tenderness.   Musculoskeletal: She exhibits no edema.   Lymphadenopathy:     She has no cervical adenopathy.   Neurological: She is alert and oriented to person, place, and time.   Skin: No rash noted. No erythema.   Bruising of skin is noted   Psychiatric: She has a normal mood and affect.       Results Review:   Lab Results (last 24 hours)     Procedure Component Value Units Date/Time    Scan Slide [[id number]] Collected:  07/17/20 1012    Specimen:  Blood Updated:  07/17/20 1211     Scan Slide --     Comment: See Manual Differential Results       Manual Differential [[id number]]  (Abnormal) Collected:  07/17/20 1012    Specimen:  Blood Updated:  07/17/20 1211     Neutrophil % 73.0 %      Lymphocyte % 8.0 %      Monocyte % 2.0 %      Bands %  16.0 %      Metamyelocyte % 1.0 %      Neutrophils Absolute 13.08 10*3/mm3      Lymphocytes Absolute 1.18 10*3/mm3      Monocytes Absolute 0.29 10*3/mm3      Anisocytosis Mod/2+     Vacuolated Neutrophils Slight/1+     Platelet Estimate Adequate    CBC & Differential [[id number]] Collected:  07/17/20 1012    Specimen:  Blood Updated:  07/17/20 1211    Narrative:       The following orders were created for panel order CBC & Differential.  Procedure                               Abnormality         Status                     ---------                               -----------         ------                     CBC Auto Differential[[id number]]        Abnormal            Final result                 Please view results for these tests on the individual orders.    CBC Auto Differential [[id number]]  (Abnormal) Collected:  07/17/20 1012    Specimen:  Blood Updated:  07/17/20 1211     WBC 14.70 10*3/mm3      RBC 3.70 10*6/mm3      Hemoglobin 10.2 g/dL      Hematocrit 32.1 %      MCV 86.7 fL      MCH 27.5 pg      MCHC 31.7 g/dL         RDW 19.8 %      RDW-SD 59.9 fl      MPV 6.7 fL      Platelets 291 10*3/mm3     Basic Metabolic Panel [[id number]]  (Abnormal) Collected:  07/17/20 1012    Specimen:  Blood Updated:  07/17/20 1152     Glucose 108 mg/dL      BUN --     Comment: Testing performed by alternate method        Creatinine 1.06 mg/dL      Sodium 141 mmol/L      Potassium 3.3 mmol/L      Chloride 91 mmol/L      CO2 34.0 mmol/L      Calcium 9.5 mg/dL      eGFR Non African Amer 53 mL/min/1.73      BUN/Creatinine Ratio --     Comment: Testing not performed        Anion Gap 16.0 mmol/L     Narrative:       GFR Normal >60  Chronic Kidney Disease <60  Kidney Failure <15      Magnesium [[id number]]  (Normal) Collected:  07/17/20 1012    Specimen:  Blood Updated:  07/17/20 1152     Magnesium 2.0 mg/dL     Phosphorus [[id number]]  (Abnormal) Collected:  07/17/20 1012    Specimen:  Blood Updated:  07/17/20 1152     Phosphorus 2.1 mg/dL     BUN [[id number]] Collected:  07/17/20 1012    Specimen:  Blood Updated:  07/17/20 1152    Blood Culture - Blood, Arm, Left [[id number]] Collected:  07/13/20 2254    Specimen:  Blood from Arm, Left Updated:  07/16/20 2301     Blood Culture No growth at 3 days    Blood Culture - Blood, Arm, Right [[id number]] Collected:  07/13/20 2231    Specimen:  Blood from Arm, Right Updated:  07/16/20 2245     Blood Culture No growth at 3 days    BUN [[id number]]  (Normal) Collected:  07/16/20 0750    Specimen:  Blood Updated:  07/16/20 1713     BUN 9 mg/dL         Imaging Results (Last 72 Hours)     ** No results found for the last 72 hours. **        ECG/EMG Results (most recent)     Procedure Component Value Units Date/Time    ECG 12 Lead [[id number]] Collected:  07/13/20 2225     Updated:  07/14/20 1614    Narrative:       HEART RATE= 107  bpm  RR Interval= 563  ms  FL Interval=   ms  P Horizontal Axis=   deg  P Front Axis=   deg  QRSD Interval= 95  ms  QT Interval= 347  ms  QRS Axis= 247  deg  T Wave Axis= 67  deg  - ABNORMAL ECG  -  Atrial fibrillation  Ventricular premature complex  RVH with secondary repolarization abnrm  When compared with ECG of 08-Sep-2019 14:16:55,  Significant axis, voltage or hypertrophy change  Electronically Signed By: Leobardo Evans (Husam) 14-Jul-2020 16:10:40  Date and Time of Study: 2020-07-13 22:25:49    Adult Transthoracic Echo Complete W/ Cont if Necessary Per Protocol [[id number]] Collected:  07/14/20 1931     Updated:  07/15/20 1744     BSA 1.5 m^2      RVIDd 2.3 cm      IVSd 1.1 cm      IVSs 1.5 cm      LVIDd 3.6 cm      LVIDs 1.9 cm      LVPWd 1.1 cm      BH CV ECHO HONEY - LVPWS 1.3 cm      IVS/LVPW 0.98     FS 46.3 %      EDV(Teich) 53.1 ml      ESV(Teich) 11.4 ml      EF(Teich) 78.6 %      EDV(cubed) 45.2 ml      ESV(cubed) 7.0 ml      EF(cubed) 84.5 %      % IVS thick 38.6 %      % LVPW thick 18.0 %      LV mass(C)d 117.8 grams      LV mass(C)dI 79.2 grams/m^2      LV mass(C)s 79.2 grams      LV mass(C)sI 53.2 grams/m^2      SV(Teich) 41.7 ml      SI(Teich) 28.0 ml/m^2      SV(cubed) 38.2 ml      SI(cubed) 25.7 ml/m^2      Ao root diam 2.9 cm      Ao root area 6.4 cm^2      ACS 1.9 cm      LVOT diam 1.8 cm      LVOT area 2.4 cm^2      EDV(MOD-sp4) 45.8 ml      ESV(MOD-sp4) 18.4 ml      EF(MOD-sp4) 59.8 %      SV(MOD-sp4) 27.4 ml      SI(MOD-sp4) 18.4 ml/m^2      Ao root area (BSA corrected) 1.9     LV Kim Vol (BSA corrected) 30.7 ml/m^2      LV Sys Vol (BSA corrected) 12.3 ml/m^2      Aortic R-R 0.15 sec      Aortic .6 BPM      MV E max ortega 63.9 cm/sec      MV A max ortega 118.8 cm/sec      MV E/A 0.54     MV V2 max 126.5 cm/sec      MV max PG 6.4 mmHg      MV V2 mean 74.1 cm/sec      MV mean PG 2.5 mmHg      MV V2 VTI 24.0 cm      MVA(VTI) 1.2 cm^2      MV dec slope 274.1 cm/sec^2      MV dec time 0.23 sec      Ao pk ortega 113.2 cm/sec      Ao max PG 5.1 mmHg      Ao max PG (full) 1.0 mmHg      Ao V2 mean 76.3 cm/sec      Ao mean PG 2.6 mmHg      Ao mean PG (full) 0.57 mmHg      Ao V2 VTI 12.0 cm       MARIA DE JESUS(I,A) 2.4 cm^2      MARIA DE JESUS(I,D) 2.4 cm^2      MARIA DE JESUS(V,A) 2.2 cm^2      MARIA DE JESUS(V,D) 2.2 cm^2      LV V1 max PG 4.1 mmHg      LV V1 mean PG 2.0 mmHg      LV V1 max 101.2 cm/sec      LV V1 mean 64.8 cm/sec      LV V1 VTI 11.6 cm      CO(Ao) 30.4 l/min      CI(Ao) 20.4 l/min/m^2      SV(Ao) 76.7 ml      SI(Ao) 51.5 ml/m^2      CO(LVOT) 11.3 l/min      CI(LVOT) 7.6 l/min/m^2      SV(LVOT) 28.5 ml      SI(LVOT) 19.1 ml/m^2      PA V2 max 83.2 cm/sec      PA max PG 2.8 mmHg      PA max PG (full) 0.31 mmHg      PA V2 mean 56.0 cm/sec      PA mean PG 1.5 mmHg      PA mean PG (full) -0.21 mmHg      PA V2 VTI 11.5 cm      PA acc time 0.04 sec      RV V1 max PG 2.5 mmHg      RV V1 mean PG 1.7 mmHg      RV V1 max 78.4 cm/sec      RV V1 mean 62.3 cm/sec      RV V1 VTI 10.4 cm      PA pr(Accel) 61.9 mmHg       CV ECHO HONEY - BZI_BMI 20.3 kilograms/m^2       CV ECHO HONEY - BSA(HAYCOCK) 1.5 m^2       CV ECHO HONEY - BZI_METRIC_WEIGHT 50.3 kg       CV ECHO HONEY - BZI_METRIC_HEIGHT 157.5 cm      EF(MOD-bp) 60.0 %      LA dimension(2D) 2.8 cm     Narrative:       · Left ventricular systolic function is normal.  · Estimated EF appears to be in the range of 61 - 65%.  · Left ventricular diastolic dysfunction (grade I) consistent with   impaired relaxation.           CBC    Results from last 7 days   Lab Units 07/17/20  1012 07/16/20  0750 07/15/20  0355 07/13/20  2231   WBC 10*3/mm3 14.70* 12.40* 10.70 10.60   HEMOGLOBIN g/dL 10.2* 9.0* 8.2* 11.3*   PLATELETS 10*3/mm3 291 290 253 299     BMP   Results from last 7 days   Lab Units 07/17/20  1012 07/16/20  0750 07/15/20  0355 07/14/20  1415 07/14/20  0432 07/13/20  2231   SODIUM mmol/L 141 140 138  --   --  137   POTASSIUM mmol/L 3.3* 3.9 4.0 4.6 3.3* 2.4*   CHLORIDE mmol/L 91* 92* 90*  --   --  81*   CO2 mmol/L 34.0* 38.0* 36.0*  --   --  42.0*   BUN   --  9 19  --   --  17   CREATININE mg/dL 1.06* 0.75 0.92  --   --  1.04*   GLUCOSE mg/dL 108* 73 140*  --   --  177*   MAGNESIUM mg/dL  2.0 1.8 1.8  --  1.8 2.0   PHOSPHORUS mg/dL 2.1* 2.5 1.6*  --  2.6 2.6     CMP   Results from last 7 days   Lab Units 07/17/20  1012 07/16/20  0750 07/15/20  0355 07/14/20  1415 07/14/20  0432 07/13/20  2231   SODIUM mmol/L 141 140 138  --   --  137   POTASSIUM mmol/L 3.3* 3.9 4.0 4.6 3.3* 2.4*   CHLORIDE mmol/L 91* 92* 90*  --   --  81*   CO2 mmol/L 34.0* 38.0* 36.0*  --   --  42.0*   BUN   --  9 19  --   --  17   CREATININE mg/dL 1.06* 0.75 0.92  --   --  1.04*   GLUCOSE mg/dL 108* 73 140*  --   --  177*   ALBUMIN g/dL  --   --   --   --   --  4.10   BILIRUBIN mg/dL  --   --   --   --   --  0.5   ALK PHOS U/L  --   --   --   --   --  103   AST (SGOT) U/L  --   --   --   --   --  27   ALT (SGPT) U/L  --   --   --   --   --  32     Cardiac Studies:  Echo-   Results for orders placed during the hospital encounter of 07/13/20   Adult Transthoracic Echo Complete W/ Cont if Necessary Per Protocol    Narrative · Left ventricular systolic function is normal.  · Estimated EF appears to be in the range of 61 - 65%.  · Left ventricular diastolic dysfunction (grade I) consistent with   impaired relaxation.        Stress Myoview-  Cath-      Medication Review:   Scheduled Meds:    amLODIPine 2.5 mg Oral Daily   atorvastatin 20 mg Oral Daily   budesonide 0.5 mg Nebulization BID - RT   bumetanide 0.5 mg Oral Daily   cetirizine 10 mg Oral Daily   DULoxetine 30 mg Oral Daily   enoxaparin 40 mg Subcutaneous Q24H   ipratropium-albuterol 3 mL Nebulization Q4H - RT   Morphine 30 mg Oral Q12H   nicotine 1 patch Transdermal Daily   permethrin  Topical Once   piperacillin-tazobactam 3.375 g Intravenous Q8H   potassium chloride 20 mEq Oral Daily   predniSONE 40 mg Oral Daily With Breakfast   senna 1 tablet Oral BID   sodium chloride 10 mL Intravenous Q12H     Continuous Infusions:   PRN Meds:.•  acetaminophen **OR** acetaminophen **OR** acetaminophen  •  Calcium Gluconate-NaCl **AND** calcium gluconate **AND** Calcium, Ionized  •   docusate sodium  •  LORazepam  •  magnesium sulfate **OR** magnesium sulfate **OR** magnesium sulfate  •  melatonin  •  Morphine  •  nitroglycerin  •  ondansetron **OR** ondansetron  •  potassium & sodium phosphates **OR** potassium & sodium phosphates  •  potassium chloride  •  potassium chloride  •  prochlorperazine  •  sodium chloride  •  sodium chloride      Assessment/Plan   Patient Active Problem List   Diagnosis   • Acute exacerbation of chronic obstructive pulmonary disease (COPD) (CMS/[facility])   • Essential hypertension   • Hyperlipidemia   • Anxiety disorder   • Congestive heart failure (CHF) (CMS/[facility])   • Depression   • Seasonal allergies   • Decubitus ulcer   • Chest pain   • Tobacco abuse       Assessment:  Atypical Chest Pain  Insignificant troponin elevation  Dyspnea: Likely related to AECOPD  Advanced home oxygen dependent COPD under hospice care at home  Hypertension  Dyslipidemia    Plan:    echocardiogram showed preserved left ventricle function.  LVEF was 60 to 65%.    Continue current Rx and supportive care.   OP f/u with cardiology YOSELIN George  07/17/20  14:24

## 2020-07-17 NOTE — PLAN OF CARE
Problem: Patient Care Overview  Goal: Plan of Care Review  Outcome: Ongoing (interventions implemented as appropriate)  Flowsheets  Taken 7/17/2020 0341  Progress: no change  Taken 7/16/2020 1901  Plan of Care Reviewed With: patient  Note:   Patient rested comfortably in bed throughout the night.  Pain treated per MAR.  Lice cannot be retreated for 9-10 days per pharmacy.  No other concerns at this time, will continue to monitor.

## 2020-07-18 NOTE — OUTREACH NOTE
Prep Survey      Responses   [facility] facility patient discharged from?  [facility]   Is LACE score < 7 ?  No   Eligibility  Not Eligible   What are the reasons patient is not eligible?  Hospice/Pallative Care   Does the patient have one of the following disease processes/diagnoses(primary or secondary)?  COPD/Pneumonia   Prep survey completed?  Yes          Jenna Alcazar RN

## 2020-07-20 NOTE — PROGRESS NOTES
2/28 need to r/o c diff.  Check cultures and then decide on course of treatment     Case Management Discharge Note        Destination - Selection Complete      Service Provider Request Status Selected Services Address Phone Number Fax Number    [facility] Selected Inpatient Hospice [address] [facility] [place] [address] [phone]        Final Discharge Disposition Code: 50 - home with hospice

## 2020-07-31 PROBLEM — A41.9 SEVERE SEPSIS (HCC): Status: ACTIVE | Noted: 2020-01-01

## 2020-07-31 PROBLEM — R65.20 SEVERE SEPSIS (HCC): Status: ACTIVE | Noted: 2020-01-01

## 2020-07-31 PROBLEM — J44.9 COPD (CHRONIC OBSTRUCTIVE PULMONARY DISEASE) (HCC): Status: ACTIVE | Noted: 2018-09-11

## 2020-07-31 PROBLEM — K21.9 GERD (GASTROESOPHAGEAL REFLUX DISEASE): Status: ACTIVE | Noted: 2018-09-11

## 2020-07-31 PROBLEM — L03.115 CELLULITIS OF RIGHT LEG: Status: ACTIVE | Noted: 2018-09-11

## 2020-08-01 NOTE — PLAN OF CARE
Problem: Patient Care Overview  Goal: Plan of Care Review  Outcome: Ongoing (interventions implemented as appropriate)  Flowsheets (Taken 8/1/2020 0253)  Progress: no change  Plan of Care Reviewed With: patient  Note:   Pt rested through the night, arouse to voice, pt had multiple bruises all over the body, no distress noted, pt is on 4LO2, pt takes her pill in apple sauce, will cont to monitor.

## 2020-08-01 NOTE — PROGRESS NOTES
[facility] Daily Progress Note      Hospitalist Team  LOS 0 days      Patient Care Team:  Naomi Quispe APRN as PCP - General (Family Medicine)    Patient Location: 221/1      Subjective   Subjective   Patient still feels short of breath and wheezing.  She is requiring of 4 L of oxygen.  She usually uses 2 L of oxygen at home.  Chief Complaint / Subjective  Chief Complaint   Patient presents with   • Shortness of Breath         Brief Synopsis of Hospital Course/HPI    59-year-old female with history of end-stage COPD on home hospice presenting with increasing shortness of breath.  She is on 2 L per nasal cannula at home.  She reports she had a trouble breathing the day after being discharged and it has progressively worsened until tonight she decided she should come to the emergency room for further evaluation.  The patient reports excessive thirst.  She has been too weak to cough up anything but she can hear it rattling when she breathes.  She states it wears her out to sit up and cough.  The patient denies any chest pain, subjective fever or chills.     In the emergency room the patient had difficulty maintaining an IV secondary to poor vasculature.  Multiple attempts were made for central line before 1 was successfully placed in the groin.     Review of records with summary: Hospital admission 7/13/2020 through 7/17/2020 for acute exacerbation of chronic COPD.  Patient had strep pneumonia antigen positive.  The patient also had wound culture positive for Providencia stuartii, staph aureus and corynebacterium species with susceptibility to Bactrim.  The patient is on chronic prednisone which was continued.  The patient had a lice infestation.  She was treated for protocol.  She was a full code.     Date::          ROS      Objective   Objective      Vital Signs  Temp:  [97.8 °F (36.6 °C)-98.2 °F (36.8 °C)] 97.8 °F (36.6 °C)  Heart Rate:  [80-98] 92  Resp:  [15-20]  "20  BP: (121-142)/(78-87) 121/78  Oxygen Therapy  SpO2: 95 %  Pulse Oximetry Type: Intermittent  Device (Oxygen Therapy): nasal cannula  Device (Oxygen Therapy): nasal cannula  Flow (L/min): 4  Oxygen Concentration (%): 36  Flowsheet Rows      First Filed Value   Admission Height  157.5 cm (62\") Documented at 07/30/2020 2220   Admission Weight  68.9 kg (152 lb) Documented at 07/30/2020 2220        Intake & Output (last 3 days)       07/29 0701 - 07/30 0700 07/30 0701 - 07/31 0700 07/31 0701 - 08/01 0700 08/01 0701 - 08/02 0700    IV Piggyback  500 450     Total Intake(mL/kg)  500 (9.5) 450 (8.6)     Net  +500 +450             Urine Unmeasured Occurrence   2 x 1 x        Lines, Drains & Airways    Active LDAs     Name:   Placement date:   Placement time:   Site:   Days:    CVC Triple Lumen 07/31/20 Right Femoral   07/31/20    0332    Femoral   1                  Physical Exam:    Physical Exam   Constitutional: She is oriented to person, place, and time. She appears well-developed and well-nourished. No distress.   HENT:   Head: Normocephalic and atraumatic.   Right Ear: External ear normal.   Left Ear: External ear normal.   Nose: Nose normal.   Mouth/Throat: Oropharynx is clear and moist. No oropharyngeal exudate.   Eyes: Pupils are equal, round, and reactive to light. Conjunctivae and EOM are normal. Right eye exhibits no discharge. Left eye exhibits no discharge. No scleral icterus.   Neck: Normal range of motion. No JVD present. No tracheal deviation present. No thyromegaly present.   Cardiovascular: Normal rate, regular rhythm, normal heart sounds and intact distal pulses. Exam reveals no gallop and no friction rub.   No murmur heard.  Pulmonary/Chest: Effort normal. No stridor. No respiratory distress. She has wheezes. She has rales. She exhibits no tenderness.   Abdominal: Soft. Bowel sounds are normal. She exhibits no distension and no mass. There is no tenderness. There is no rebound and no guarding. No " hernia.   Musculoskeletal: Normal range of motion. She exhibits no edema, tenderness or deformity.   Lymphadenopathy:     She has no cervical adenopathy.   Neurological: She is alert and oriented to person, place, and time. No cranial nerve deficit or sensory deficit. She exhibits normal muscle tone. Coordination normal.   Skin: Skin is warm and dry. No rash noted. She is not diaphoretic. No erythema.   Psychiatric: She has a normal mood and affect. Her behavior is normal.   Nursing note and vitals reviewed.            Procedures:              Results Review:     I reviewed the patient's new clinical results.      Lab Results (last 24 hours)     Procedure Component Value Units Date/Time    Creatinine, Serum [[id number]]  (Abnormal) Collected:  08/01/20 0353    Specimen:  Blood Updated:  08/01/20 0450     Creatinine 1.12 mg/dL      eGFR Non African Amer 50 mL/min/1.73     Narrative:       GFR Normal >60  Chronic Kidney Disease <60  Kidney Failure <15      Blood Culture - Blood, Arm, Right [[id number]] Collected:  07/30/20 2252    Specimen:  Blood from Arm, Right Updated:  07/31/20 2300     Blood Culture No growth at 24 hours    Blood Culture - Blood, Arm, Left [[id number]] Collected:  07/30/20 2252    Specimen:  Blood from Arm, Left Updated:  07/31/20 2300     Blood Culture No growth at 24 hours    Potassium [[id number]]  (Normal) Collected:  07/31/20 1746    Specimen:  Blood Updated:  07/31/20 2106     Potassium 3.6 mmol/L      Comment: Result checked       Troponin [[id number]]  (Abnormal) Collected:  07/31/20 1746    Specimen:  Blood Updated:  07/31/20 1923     Troponin T 0.096 ng/mL     Narrative:       Troponin T Reference Range:  <= 0.03 ng/mL-   Negative for AMI  >0.03 ng/mL-     Abnormal for myocardial necrosis.  Clinicians would have to utilize clinical acumen, EKG, Troponin and serial changes to determine if it is an Acute Myocardial Infarction or myocardial injury due to an underlying chronic condition.              Results may be falsely decreased if patient taking Biotin.      MRSA Screen, PCR (Inpatient) - Swab, Nares [[id number]]  (Normal) Collected:  07/31/20 1025    Specimen:  Swab from Nares Updated:  07/31/20 1153     MRSA PCR No MRSA Detected    Troponin [[id number]]  (Abnormal) Collected:  07/31/20 1051    Specimen:  Blood Updated:  07/31/20 1149     Troponin T 0.104 ng/mL      Comment: Specimen hemolyzed.  Results may be affected.       Narrative:       Troponin T Reference Range:  <= 0.03 ng/mL-   Negative for AMI  >0.03 ng/mL-     Abnormal for myocardial necrosis.  Clinicians would have to utilize clinical acumen, EKG, Troponin and serial changes to determine if it is an Acute Myocardial Infarction or myocardial injury due to an underlying chronic condition.       Results may be falsely decreased if patient taking Biotin.          No results found for: HGBA1C  Results from last 7 days   Lab Units 07/30/20 2252   INR  0.96       Results from last 7 days   Lab Units 07/30/20 2302   PH, ARTERIAL pH units 7.534*   PO2 ART mm Hg 90.8   PCO2, ARTERIAL mm Hg 54.0*   HCO3 ART mmol/L 45.5*     No results found for: LIPASE  No results found for: CHOL, CHLPL, TRIG, HDL, LDL, LDLDIRECT    No results found for: INTRAOP, PREDX, FINALDX, COMDX    Microbiology Results (last 10 days)     Procedure Component Value - Date/Time    MRSA Screen, PCR (Inpatient) - Swab, Nares [[id number]]  (Normal) Collected:  07/31/20 1025    Lab Status:  Final result Specimen:  Swab from Nares Updated:  07/31/20 1153     MRSA PCR No MRSA Detected    Blood Culture - Blood, Arm, Right [[id number]] Collected:  07/30/20 2252    Lab Status:  Preliminary result Specimen:  Blood from Arm, Right Updated:  07/31/20 2300     Blood Culture No growth at 24 hours    Blood Culture - Blood, Arm, Left [[id number]] Collected:  07/30/20 2252    Lab Status:  Preliminary result Specimen:  Blood from Arm, Left Updated:  07/31/20 2300     Blood Culture No growth at 24  hours    Respiratory Panel PCR w/COVID-19(SARS-CoV-2) MACHELLE/CODEY/FIORELLA In-House, NP Swab in UTM/VTP, 8-12 Hr TAT - Swab, Nasopharynx [[id number]]  (Normal) Collected:  07/30/20 2257    Lab Status:  Final result Specimen:  Swab from Nasopharynx Updated:  07/31/20 0054     ADENOVIRUS, PCR Not Detected     Coronavirus 229E Not Detected     Coronavirus HKU1 Not Detected     Coronavirus NL63 Not Detected     Coronavirus OC43 Not Detected     COVID19 Not Detected     Human Metapneumovirus Not Detected     Human Rhinovirus/Enterovirus Not Detected     Influenza A PCR Not Detected     Influenza A H1 Not Detected     Influenza A H1 2009 PCR Not Detected     Influenza A H3 Not Detected     Influenza B PCR Not Detected     Parainfluenza Virus 1 Not Detected     Parainfluenza Virus 2 Not Detected     Parainfluenza Virus 3 Not Detected     Parainfluenza Virus 4 Not Detected     RSV, PCR Not Detected     Bordetella pertussis pcr Not Detected     Bordetella parapertussis PCR Not Detected     Chlamydophila pneumoniae PCR Not Detected     Mycoplasma pneumo by PCR Not Detected    Narrative:       Fact sheet for providers: https://docs.[employer]/wp-content/uploads/IJF5182-0310-AJ1.1-EUA-Provider-Fact-Sheet-3.pdf    Fact sheet for patients: https://docs.[employer]/wp-content/uploads/LBT6151-3705-LZ6.1-EUA-Patient-Fact-Sheet-1.pdf          ECG/EMG Results (most recent)     None               Results for orders placed during the hospital encounter of 07/13/20   Adult Transthoracic Echo Complete W/ Cont if Necessary Per Protocol    Narrative · Left ventricular systolic function is normal.  · Estimated EF appears to be in the range of 61 - 65%.  · Left ventricular diastolic dysfunction (grade I) consistent with   impaired relaxation.          Xr Chest 1 View    Result Date: 7/31/2020  Mild linear atelectasis within left midlung, but otherwise no acute cardiopulmonary process identified.  Electronically Signed By-DR. Chintan Stern MD  On:7/31/2020 7:19 AM This report was finalized on [id number] by DR. Chintan Stern MD.    Ct Chest Pulmonary Embolism    Result Date: 7/31/2020  1. No pulmonary embolus. 2. Chronic obstructive airways disease with some patchy atelectasis of the lungs. No focal consolidation. 3. Basilar bronchial wall thickening that is likely a bronchitis although some of it could be chronic. 4. Partially visualized contrast extravasation in the soft tissues of the right shoulder. Electronically signed by:  Melvin Tay M.D.  7/31/2020 2:06 AM          Xrays, labs reviewed personally by physician.    Medication Review:   I have reviewed the patient's current medication list      Scheduled Meds    [START ON 8/3/2020] !Vancomycin Level Draw Needed  Does not apply Once   [START ON 8/3/2020] !Vancomycin Level Draw Needed  Does not apply Once   bacitracin  Topical Q8H   cefepime 2 g Intravenous Q12H   cetirizine 10 mg Oral Daily   docusate sodium 100 mg Oral BID   DULoxetine 30 mg Oral BID   enoxaparin 40 mg Subcutaneous Daily   ipratropium-albuterol 3 mL Nebulization 4x Daily - RT   Morphine 30 mg Oral Q12H   nicotine 1 patch Transdermal Daily   nystatin  Topical BID   potassium chloride 20 mEq Oral Daily   predniSONE 20 mg Oral Daily With Breakfast & Dinner   senna 2 tablet Oral Nightly   sodium chloride 10 mL Intravenous Q12H   vancomycin 15 mg/kg Intravenous Q24H       Meds Infusions    Pharmacy to Dose Cefepime    Pharmacy to dose vancomycin        Meds PRN  acetaminophen **OR** acetaminophen **OR** acetaminophen  •  albuterol  •  bismuth subsalicylate  •  calcium carbonate  •  guaiFENesin  •  LORazepam  •  melatonin  •  Morphine  •  nitroglycerin  •  ondansetron **OR** ondansetron  •  Pharmacy to Dose Cefepime  •  Pharmacy to dose vancomycin  •  potassium chloride  •  potassium chloride  •  prochlorperazine  •  sodium chloride  •  sodium chloride        Assessment/Plan   Assessment/Plan     Active Hospital Problems     Diagnosis  POA   • **COPD (chronic obstructive pulmonary disease) (CMS/[facility]) [J44.9]  Yes   • Severe sepsis (CMS/[facility]) [A41.9, R65.20]  Yes   • Essential hypertension [I10]  Yes   • Hyperlipidemia [E78.5]  Yes   • Anxiety disorder [F41.9]  Yes   • Congestive heart failure (CHF) (CMS/[facility]) [I50.9]  Yes      Resolved Hospital Problems   No resolved problems to display.       MEDICAL DECISION MAKING COMPLEXITY BY PROBLEM:     Simple sepsis secondary to acute bronchitis, will change IV vancomycin to oral doxycycline, continue IV cefepime for today, sputum culture if possible, monitor clinical progress.  Will admit units.    Acute on chronic hypoxic respiratory failure, requiring now 4 L of oxygen, patient on 2 L of home oxygen at home.  Taper of oxygen as tolerated.     Acute exacerbation of COPD, IV Solu-Medrol, DuoNeb, Mucinex, monitor clinical progress.     Anemia, Hemoglobin 10.7: Repeat CBC     Hypokalemia, marked, potassium 2.7, uncertain etiology: Potassium replacement protocol     Hyponatremia, mild sodium 133: IV fluids normal saline; repeat BMP.     Hospice care secondary to COPD: Continue Ativan; continue MS Contin     Anxiety, chronic: Continue Ativan, Cymbalta     VTE Prophylaxis -     VTE Prophylaxis -   Mechanical Order History:     None      Pharmalogical Order History:     Ordered     Dose Route Frequency Stop    07/31/20 0457  enoxaparin (LOVENOX) syringe 40 mg      40 mg SC Daily --            Code Status -   Code Status and Medical Interventions:   Ordered at: 07/31/20 0457     Code Status:    CPR     Medical Interventions (Level of Support Prior to Arrest):    Full       This patient has been examined wearing appropriate Personal Protective Equipment and       Discharge Planning            Electronically signed by Maxine Adler MD, 08/01/20, 09:07.  [facility] Hospitalist Team

## 2020-08-01 NOTE — NURSING NOTE
Pt daughter called  with an attitude, said no one called her to update on her mother, when nurse told her the pt status, someone in background said , we already know that, the daughter repeat the same thing, and said she will call back in the morning.

## 2020-08-02 NOTE — PROGRESS NOTES
[facility] Medicine Services Daily Progress Note      Hospitalist Team  LOS 1 days      Patient Care Team:  Naomi uQispe APRN as PCP - General (Family Medicine)    Patient Location: 221/1      Subjective   Subjective   She said she is feeling better but still wheezy and short of breath.   Chief Complaint / Subjective  Chief Complaint   Patient presents with   • Shortness of Breath         Brief Synopsis of Hospital Course/HPI    59-year-old female with history of end-stage COPD on home hospice presenting with increasing shortness of breath.  She is on 2 L per nasal cannula at home.  She reports she had a trouble breathing the day after being discharged and it has progressively worsened until tonight she decided she should come to the emergency room for further evaluation.  The patient reports excessive thirst.  She has been too weak to cough up anything but she can hear it rattling when she breathes.  She states it wears her out to sit up and cough.  The patient denies any chest pain, subjective fever or chills.     In the emergency room the patient had difficulty maintaining an IV secondary to poor vasculature.  Multiple attempts were made for central line before 1 was successfully placed in the groin.     Review of records with summary: Hospital admission 7/13/2020 through 7/17/2020 for acute exacerbation of chronic COPD.  Patient had strep pneumonia antigen positive.  The patient also had wound culture positive for Providencia stuartii, staph aureus and corynebacterium species with susceptibility to Bactrim.  The patient is on chronic prednisone which was continued.  The patient had a lice infestation.  She was treated for protocol.  She was a full code.     Date::          ROS      Objective   Objective      Vital Signs  Temp:  [97.9 °F (36.6 °C)-98 °F (36.7 °C)] 98 °F (36.7 °C)  Heart Rate:  [] 96  Resp:  [17-28] 22  BP: (127-130)/(83-85) 127/84  Oxygen Therapy  SpO2: (S)  "(!) 82 %(room air, placed back on pt at 4 liters.)  Pulse Oximetry Type: Intermittent  Device (Oxygen Therapy): nasal cannula  Device (Oxygen Therapy): (S) nasal cannula(Pt states she wears 02 @ home on 2 liters.)  Flow (L/min): (S) 4(Sat increased to 94% on 4 liters.)  Oxygen Concentration (%): 36  Flowsheet Rows      First Filed Value   Admission Height  157.5 cm (62\") Documented at 07/30/2020 2220   Admission Weight  68.9 kg (152 lb) Documented at 07/30/2020 2220        Intake & Output (last 3 days)       07/30 0701 - 07/31 0700 07/31 0701 - 08/01 0700 08/01 0701 - 08/02 0700 08/02 0701 - 08/03 0700    P.O.   420     IV Piggyback 500 450 100     Total Intake(mL/kg) 500 (9.5) 450 (8.6) 520 (9.8)     Net +500 +450 +520             Urine Unmeasured Occurrence  2 x 3 x 1 x        Lines, Drains & Airways    Active LDAs     Name:   Placement date:   Placement time:   Site:   Days:    CVC Triple Lumen 07/31/20 Right Femoral   07/31/20    0332    Femoral   1                  Physical Exam:    Physical Exam   Constitutional: She is oriented to person, place, and time. She appears well-developed and well-nourished. No distress.   HENT:   Head: Normocephalic and atraumatic.   Right Ear: External ear normal.   Left Ear: External ear normal.   Nose: Nose normal.   Mouth/Throat: Oropharynx is clear and moist. No oropharyngeal exudate.   Eyes: Pupils are equal, round, and reactive to light. Conjunctivae and EOM are normal. Right eye exhibits no discharge. Left eye exhibits no discharge. No scleral icterus.   Neck: Normal range of motion. No JVD present. No tracheal deviation present. No thyromegaly present.   Cardiovascular: Normal rate, regular rhythm, normal heart sounds and intact distal pulses. Exam reveals no gallop and no friction rub.   No murmur heard.  Pulmonary/Chest: Effort normal. No stridor. No respiratory distress. She has wheezes. She has rales. She exhibits no tenderness.   Abdominal: Soft. Bowel sounds are " normal. She exhibits no distension and no mass. There is no tenderness. There is no rebound and no guarding. No hernia.   Musculoskeletal: Normal range of motion. She exhibits no edema, tenderness or deformity.   Lymphadenopathy:     She has no cervical adenopathy.   Neurological: She is alert and oriented to person, place, and time. No cranial nerve deficit or sensory deficit. She exhibits normal muscle tone. Coordination normal.   Skin: Skin is warm and dry. No rash noted. She is not diaphoretic. No erythema.   Psychiatric: She has a normal mood and affect. Her behavior is normal.   Nursing note and vitals reviewed.            Procedures:              Results Review:     I reviewed the patient's new clinical results.      Lab Results (last 24 hours)     Procedure Component Value Units Date/Time    POC Glucose Once [[id number]]  (Abnormal) Collected:  08/02/20 0826    Specimen:  Blood Updated:  08/02/20 0839     Glucose 133 mg/dL      Comment: Serial Number: [id number]:  [id number]       Creatinine, Serum [[id number]]  (Normal) Collected:  08/02/20 0220    Specimen:  Blood Updated:  08/02/20 0251     Creatinine 0.85 mg/dL      eGFR Non African Amer 68 mL/min/1.73     Narrative:       GFR Normal >60  Chronic Kidney Disease <60  Kidney Failure <15      Hemoglobin A1c [[id number]] Collected:  08/02/20 0220    Specimen:  Blood Updated:  08/02/20 0231    POC Glucose Once [[id number]]  (Abnormal) Collected:  08/02/20 0156    Specimen:  Blood Updated:  08/02/20 0157     Glucose 132 mg/dL      Comment: Serial Number: [id number]:  [id number]       Blood Culture - Blood, Arm, Right [[id number]] Collected:  07/30/20 2252    Specimen:  Blood from Arm, Right Updated:  08/01/20 2300     Blood Culture No growth at 2 days    Blood Culture - Blood, Arm, Left [[id number]] Collected:  07/30/20 2252    Specimen:  Blood from Arm, Left Updated:  08/01/20 2300     Blood Culture No growth at 2 days    POC Glucose Once [[id number]]   (Abnormal) Collected:  08/01/20 2156    Specimen:  Blood Updated:  08/01/20 2157     Glucose 206 mg/dL      Comment: Serial Number: [id number]:  [id number]           No results found for: HGBA1C  Results from last 7 days   Lab Units 07/30/20 2252   INR  0.96       Results from last 7 days   Lab Units 07/30/20 2302   PH, ARTERIAL pH units 7.534*   PO2 ART mm Hg 90.8   PCO2, ARTERIAL mm Hg 54.0*   HCO3 ART mmol/L 45.5*     No results found for: LIPASE  No results found for: CHOL, CHLPL, TRIG, HDL, LDL, LDLDIRECT    No results found for: INTRAOP, PREDX, FINALDX, COMDX    Microbiology Results (last 10 days)     Procedure Component Value - Date/Time    MRSA Screen, PCR (Inpatient) - Swab, Nares [[id number]]  (Normal) Collected:  07/31/20 1025    Lab Status:  Final result Specimen:  Swab from Nares Updated:  07/31/20 1153     MRSA PCR No MRSA Detected    Blood Culture - Blood, Arm, Right [[id number]] Collected:  07/30/20 2252    Lab Status:  Preliminary result Specimen:  Blood from Arm, Right Updated:  08/01/20 2300     Blood Culture No growth at 2 days    Blood Culture - Blood, Arm, Left [[id number]] Collected:  07/30/20 2252    Lab Status:  Preliminary result Specimen:  Blood from Arm, Left Updated:  08/01/20 2300     Blood Culture No growth at 2 days    Respiratory Panel PCR w/COVID-19(SARS-CoV-2) MACHELLE/CODEY/FIORELLA In-House, NP Swab in [facility], 8-12 Hr TAT - Swab, Nasopharynx [[id number]]  (Normal) Collected:  07/30/20 2252    Lab Status:  Final result Specimen:  Swab from Nasopharynx Updated:  07/31/20 0054     ADENOVIRUS, PCR Not Detected     Coronavirus 229E Not Detected     Coronavirus HKU1 Not Detected     Coronavirus NL63 Not Detected     Coronavirus OC43 Not Detected     COVID19 Not Detected     Human Metapneumovirus Not Detected     Human Rhinovirus/Enterovirus Not Detected     Influenza A PCR Not Detected     Influenza A H1 Not Detected     Influenza A H1 2009 PCR Not Detected     Influenza A H3 Not Detected      Influenza B PCR Not Detected     Parainfluenza Virus 1 Not Detected     Parainfluenza Virus 2 Not Detected     Parainfluenza Virus 3 Not Detected     Parainfluenza Virus 4 Not Detected     RSV, PCR Not Detected     Bordetella pertussis pcr Not Detected     Bordetella parapertussis PCR Not Detected     Chlamydophila pneumoniae PCR Not Detected     Mycoplasma pneumo by PCR Not Detected    Narrative:       Fact sheet for providers: https://docs.Chirpme/wp-content/uploads/WRC3402-4402-YT2.1-EUA-Provider-Fact-Sheet-3.pdf    Fact sheet for patients: https://docs.Chirpme/wp-content/uploads/FTP5600-1575-SN3.1-EUA-Patient-Fact-Sheet-1.pdf          ECG/EMG Results (most recent)     Procedure Component Value Units Date/Time    ECG 12 Lead [[id number]] Collected:  07/30/20 2231     Updated:  08/01/20 1503    Narrative:       HEART RATE= 104  bpm  RR Interval= 592  ms  OR Interval= 152  ms  P Horizontal Axis=   deg  P Front Axis= 87  deg  QRSD Interval= 81  ms  QT Interval= 367  ms  QRS Axis= -81  deg  T Wave Axis= 74  deg  - ABNORMAL ECG -  Sinus tachycardia  Atrial premature complexes  Right atrial enlargement  LAD, consider left anterior fascicular block  Right ventricular hypertrophy  Repol abnrm suggests ischemia, diffuse leads  Electronically Signed By:   Date and Time of Study: 2020-07-30 22:31:32               Results for orders placed during the hospital encounter of 07/13/20   Adult Transthoracic Echo Complete W/ Cont if Necessary Per Protocol    Narrative · Left ventricular systolic function is normal.  · Estimated EF appears to be in the range of 61 - 65%.  · Left ventricular diastolic dysfunction (grade I) consistent with   impaired relaxation.          Xr Chest 1 View    Result Date: 7/31/2020  Mild linear atelectasis within left midlung, but otherwise no acute cardiopulmonary process identified.  Electronically Signed By-DR. Chintan Stern MD On:7/31/2020 7:19 AM This report was finalized on  [id number] by DR. Chintan Stern MD.    Ct Chest Pulmonary Embolism    Result Date: 7/31/2020  1. No pulmonary embolus. 2. Chronic obstructive airways disease with some patchy atelectasis of the lungs. No focal consolidation. 3. Basilar bronchial wall thickening that is likely a bronchitis although some of it could be chronic. 4. Partially visualized contrast extravasation in the soft tissues of the right shoulder. Electronically signed by:  Melvin Tay M.D.  7/31/2020 2:06 AM          Xrays, labs reviewed personally by physician.    Medication Review:   I have reviewed the patient's current medication list      Scheduled Meds    bacitracin  Topical Q8H   cefepime 2 g Intravenous Q12H   cetirizine 10 mg Oral Daily   docusate sodium 100 mg Oral BID   doxycycline 100 mg Oral Q12H   DULoxetine 30 mg Oral BID   enoxaparin 40 mg Subcutaneous Daily   guaifenesin-dextromethorphan 1 tablet Oral BID   insulin lispro 0-7 Units Subcutaneous TID AC   ipratropium-albuterol 3 mL Nebulization 4x Daily - RT   methylPREDNISolone sodium succinate 40 mg Intravenous Q8H   Morphine 30 mg Oral Q12H   nicotine 1 patch Transdermal Daily   nystatin  Topical BID   potassium chloride 20 mEq Oral Daily   senna 2 tablet Oral Nightly   sodium chloride 10 mL Intravenous Q12H       Meds Infusions    Pharmacy to Dose Cefepime        Meds PRN  •  acetaminophen **OR** acetaminophen **OR** acetaminophen  •  albuterol  •  bismuth subsalicylate  •  calcium carbonate  •  dextrose  •  dextrose  •  glucagon (human recombinant)  •  guaiFENesin  •  insulin lispro **AND** insulin lispro  •  LORazepam  •  melatonin  •  Morphine  •  nitroglycerin  •  ondansetron **OR** ondansetron  •  Pharmacy to Dose Cefepime  •  potassium chloride  •  potassium chloride  •  prochlorperazine  •  sodium chloride  •  sodium chloride        Assessment/Plan   Assessment/Plan     Active Hospital Problems    Diagnosis  POA   • **COPD (chronic obstructive pulmonary  disease) (CMS/[facility]) [J44.9]  Yes   • Severe sepsis (CMS/[facility]) [A41.9, R65.20]  Yes   • Essential hypertension [I10]  Yes   • Hyperlipidemia [E78.5]  Yes   • Anxiety disorder [F41.9]  Yes   • Congestive heart failure (CHF) (CMS/[facility]) [I50.9]  Yes      Resolved Hospital Problems   No resolved problems to display.       MEDICAL DECISION MAKING COMPLEXITY BY PROBLEM:     Simple sepsis secondary to acute bronchitis, Continue oral doxycyline, will change iv cefepime to oral omnicef, sputum culture if possible, can be discharge in next 24 to 48 hours if coming along well on oral antibiotic in total of seven day course.    Acute on chronic hypoxic respiratory failure, requiring now 4 L of oxygen ... Taper off as tolerated, patient on 2 L of home oxygen at home.  Taper of oxygen as tolerated.     Acute exacerbation of COPD improving slowly, IV Solu-Medrol .... Can be changed to oral am, DuoNeb, Mucinex, monitor clinical progress.     Anemia, Hemoglobin 10.7: Repeat CBC     Hypokalemia, marked, potassium 2.7, uncertain etiology: Potassium replacement protocol     Hyponatremia, mild sodium 133: IV fluids normal saline; repeat BMP.     Hospice care secondary to COPD: Continue Ativan; continue MS Contin     Anxiety, chronic: Continue Ativan, Cymbalta     VTE Prophylaxis -     VTE Prophylaxis -   Mechanical Order History:     None      Pharmalogical Order History:     Ordered     Dose Route Frequency Stop    07/31/20 0457  enoxaparin (LOVENOX) syringe 40 mg      40 mg SC Daily --            Code Status -   Code Status and Medical Interventions:   Ordered at: 07/31/20 0457     Code Status:    CPR     Medical Interventions (Level of Support Prior to Arrest):    Full       This patient has been examined wearing appropriate Personal Protective Equipment and       Discharge Planning            Electronically signed by Maxine Adler MD, 08/02/20, 11:15.  [facility], [place], operated by [facility] Hospitalist Team

## 2020-08-02 NOTE — PLAN OF CARE
Problem: Patient Care Overview  Goal: Plan of Care Review  Outcome: Ongoing (interventions implemented as appropriate)  Flowsheets (Taken 8/2/2020 0815)  Progress: no change  Note:   Pt anxious asking for daughter, call  to daughter was facilitated at bed time. Midline dressing changed, no distress noted, pt is receiving breathing tx and on a 4L O2, will cont to monitor.

## 2020-08-02 NOTE — PLAN OF CARE
Patient still confused at times an will pull off heart monitor and pull at IV. Her sats drop in the 80's when she takes off her oxygen will continue to monitor  Problem: Patient Care Overview  Goal: Plan of Care Review  Outcome: Ongoing (interventions implemented as appropriate)

## 2020-08-03 NOTE — PLAN OF CARE
Problem: Patient Care Overview  Goal: Plan of Care Review  Outcome: Ongoing (interventions implemented as appropriate)  Flowsheets  Taken 8/3/2020 1532  Progress: improving  Outcome Summary: Patient states that she is feeling better at this time, she is resting well. She did complain of pain earlier in the shift (see MAR). Patient is hospice at home, will likely discharge back home tomorrow. Will continue to monitor.  Taken 8/3/2020 0701  Plan of Care Reviewed With: patient

## 2020-08-03 NOTE — PLAN OF CARE
Problem: Patient Care Overview  Goal: Plan of Care Review  Outcome: Ongoing (interventions implemented as appropriate)  Flowsheets (Taken 8/3/2020 7942)  Outcome Summary: pt has had no complaints overnight - appears to have rested well. Pt should be able to d/c in next 24-48 hours per MD if continues to improve. Pt will return home with daughter. Will continue to monitor pt

## 2020-08-03 NOTE — PROGRESS NOTES
Tobacco Treatment Specialist Note:    Pt was seen today by TTS for tobacco cessation. Pt seemed somewhat confused but was able to tell me she has not smoked in 3 days. Pt states she is trying to quit smoking. Spoke briefly to pt about smoking cessation options.  Will continue to monitor.

## 2020-08-04 NOTE — NURSING NOTE
Patient blood sugar 60. Patient has orange juice at bedside and receiving breakfast tray now. Will recheck blood sugar.

## 2020-08-04 NOTE — PROGRESS NOTES
[facility] Medicine Services Daily Progress Note      Hospitalist Team  LOS 3 days      Patient Care Team:  Naomi Quispe APRN as PCP - General (Family Medicine)    Patient Location: 221/1      Subjective   Subjective   She said she is feeling better but still wheezy and short of breath.   Chief Complaint / Subjective  Chief Complaint   Patient presents with   • Shortness of Breath         Brief Synopsis of Hospital Course/HPI    59-year-old female with history of end-stage COPD on home hospice presenting with increasing shortness of breath.  She is on 2 L per nasal cannula at home.  She reports she had a trouble breathing the day after being discharged and it has progressively worsened until tonight she decided she should come to the emergency room for further evaluation.  The patient reports excessive thirst.  She has been too weak to cough up anything but she can hear it rattling when she breathes.  She states it wears her out to sit up and cough.  The patient denies any chest pain, subjective fever or chills.     In the emergency room the patient had difficulty maintaining an IV secondary to poor vasculature.  Multiple attempts were made for central line before 1 was successfully placed in the groin.     Review of records with summary: Hospital admission 7/13/2020 through 7/17/2020 for acute exacerbation of chronic COPD.  Patient had strep pneumonia antigen positive.  The patient also had wound culture positive for Providencia stuartii, staph aureus and corynebacterium species with susceptibility to Bactrim.  The patient is on chronic prednisone which was continued.  The patient had a lice infestation.  She was treated for protocol.  She was a full code.     Date::    8/3/2020  Drowsy at bedside, seen after given sedating medications. Shortness of breath improved.       ROS  Could not be reliably obtained due to: AMS d/t sedation    Objective   Objective      Vital Signs  Temp:   "[97.5 °F (36.4 °C)-97.9 °F (36.6 °C)] 97.9 °F (36.6 °C)  Heart Rate:  [] 101  Resp:  [22-24] 22  BP: (129-146)/(83-89) 146/89  Oxygen Therapy  SpO2: 99 %  Pulse Oximetry Type: Intermittent  Device (Oxygen Therapy): nasal cannula  Device (Oxygen Therapy): nasal cannula  Flow (L/min): 4  Oxygen Concentration (%): 32  Flowsheet Rows      First Filed Value   Admission Height  157.5 cm (62\") Documented at 07/30/2020 2220   Admission Weight  68.9 kg (152 lb) Documented at 07/30/2020 2220        Intake & Output (last 3 days)       08/01 0701 - 08/02 0700 08/02 0701 - 08/03 0700 08/03 0701 - 08/04 0700 08/04 0701 - 08/05 0700    P.O. 420 460 500 120    I.V. (mL/kg)   24 (0.5)     IV Piggyback 100 100      Total Intake(mL/kg) 520 (9.8) 560 (10.6) 524 (9.8) 120 (2.3)    Net +520 +560 +524 +120            Urine Unmeasured Occurrence 3 x 6 x 3 x         Lines, Drains & Airways    Active LDAs     Name:   Placement date:   Placement time:   Site:   Days:    CVC Triple Lumen 07/31/20 Right Femoral   07/31/20    0332    Femoral   1                  Physical Exam:    Physical Exam   Constitutional: She appears well-developed and well-nourished. No distress.   HENT:   Head: Normocephalic and atraumatic.   Right Ear: External ear normal.   Left Ear: External ear normal.   Nose: Nose normal.   Mouth/Throat: Oropharynx is clear and moist. No oropharyngeal exudate.   Eyes: Pupils are equal, round, and reactive to light. Conjunctivae and EOM are normal. Right eye exhibits no discharge. Left eye exhibits no discharge. No scleral icterus.   Neck: Normal range of motion. No JVD present. No tracheal deviation present. No thyromegaly present.   Cardiovascular: Normal rate, regular rhythm, normal heart sounds and intact distal pulses. Exam reveals no gallop and no friction rub.   No murmur heard.  Pulmonary/Chest: Effort normal. No stridor. No respiratory distress. She has no wheezes. She has no rales. She exhibits no tenderness.   "   Abdominal: Soft. Bowel sounds are normal. She exhibits no distension and no mass. There is no tenderness. There is no rebound and no guarding. No hernia.   Musculoskeletal: Normal range of motion. She exhibits no edema, tenderness or deformity.   Lymphadenopathy:     She has no cervical adenopathy.   Neurological: She is alert. No cranial nerve deficit or sensory deficit. She exhibits normal muscle tone. Coordination normal.   Skin: Skin is warm and dry. No rash noted. She is not diaphoretic. No erythema.   Psychiatric: She has a normal mood and affect. Her behavior is normal.   Nursing note and vitals reviewed.           Wounds (last 24 hours)      LDA Wound     Row Name 08/03/20 1901             Wound 08/02/20 1901 Left anterior knee Skin Tear    Wound - Properties Group Date first assessed: 08/02/20  -KK Time first assessed: 1901  -KK Side: Left  -KK Orientation: anterior  -KK Location: knee  -KK Primary Wound Type: Skin tear  -KK    Dressing Appearance  dry;intact  -LM      Closure  MILDRED  -LM         Wound 07/14/20 0211 Left anterior;lower leg Other (comment)    Wound - Properties Group Date first assessed: 07/14/20  - Time first assessed: 0211  -SH Present on Hospital Admission: Y  -SH Side: Left  -SH Orientation: anterior;lower  -SH Location: leg  -SH Primary Wound Type: Other  -SH, ulcers     Dressing Appearance  dry;intact  -LM      Closure  MILDRED  -LM      Base  dressing in place, unable to visualize  -LM         Wound 07/14/20 0210 Right anterior;lower;proximal leg Other (comment)    Wound - Properties Group Date first assessed: 07/14/20  - Time first assessed: 0210  -SH Present on Hospital Admission: Y  -SH Side: Right  -SH Orientation: anterior;lower;proximal  -SH Location: leg  -SH Primary Wound Type: Other  -SH, ulcer     Dressing Appearance  dry;intact  -LM      Closure  MILDRED  -LM        User Key  (r) = Recorded By, (t) = Taken By, (c) = Cosigned By    Initials Name Provider Type    Silva Larios  ANTONI RN Registered Nurse    Nubia Yoder RN Registered Nurse    Yahaira Alejandro RN Registered Nurse          Procedures:              Results Review:     I reviewed the patient's new clinical results.      Lab Results (last 24 hours)     Procedure Component Value Units Date/Time    POC Glucose Once [[id number]]  (Normal) Collected:  08/04/20 0810    Specimen:  Blood Updated:  08/04/20 0811     Glucose 73 mg/dL      Comment: Serial Number: [id number]:  [id number]       POC Glucose Once [[id number]]  (Abnormal) Collected:  08/04/20 0740    Specimen:  Blood Updated:  08/04/20 0744     Glucose 60 mg/dL      Comment: Serial Number: [id number]:  [id number]       Creatinine, Serum [[id number]]  (Normal) Collected:  08/04/20 0433    Specimen:  Blood Updated:  08/04/20 0515     Creatinine 0.77 mg/dL      eGFR Non African Amer 77 mL/min/1.73     Narrative:       GFR Normal >60  Chronic Kidney Disease <60  Kidney Failure <15      Blood Culture - Blood, Arm, Left [[id number]] Collected:  07/30/20 2252    Specimen:  Blood from Arm, Left Updated:  08/03/20 2300     Blood Culture No growth at 4 days    Blood Culture - Blood, Arm, Right [[id number]] Collected:  07/30/20 2252    Specimen:  Blood from Arm, Right Updated:  08/03/20 2300     Blood Culture No growth at 4 days    POC Glucose Once [[id number]]  (Abnormal) Collected:  08/03/20 2046    Specimen:  Blood Updated:  08/03/20 2048     Glucose 116 mg/dL      Comment: Serial Number: [id number]:  [id number]       POC Glucose Once [[id number]]  (Normal) Collected:  08/03/20 1820    Specimen:  Blood Updated:  08/03/20 1821     Glucose 96 mg/dL      Comment: Serial Number: [id number]:  [id number]       POC Glucose Once [[id number]]  (Abnormal) Collected:  08/03/20 1133    Specimen:  Blood Updated:  08/03/20 1136     Glucose 171 mg/dL      Comment: Serial Number: [id number]:  [id number]       Hemoglobin A1c [[id number]]  (Abnormal) Collected:  08/02/20 0220     Specimen:  Blood Updated:  08/03/20 1058     Hemoglobin A1C 5.9 %     Narrative:       Hemoglobin A1C Reference Range:    <5.7 %        Normal  5.7-6.4 %     Increased risk for diabetes  > 6.4 %        Diabetes       These guidelines have been recommended by the American Diabetic Association for Hgb A1c.      The following 2010 guidelines have been recommended by the American Diabetes Association for Hemoglobin A1c.    HBA1c 5.7-6.4% Increased risk for future diabetes (pre-diabetes)  HBA1c     >6.4% Diabetes          Hemoglobin A1C   Date Value Ref Range Status   08/02/2020 5.9 (H) 3.5 - 5.6 % Final     Results from last 7 days   Lab Units 07/30/20 2252   INR  0.96       Results from last 7 days   Lab Units 07/30/20 2302   PH, ARTERIAL pH units 7.534*   PO2 ART mm Hg 90.8   PCO2, ARTERIAL mm Hg 54.0*   HCO3 ART mmol/L 45.5*     No results found for: LIPASE  No results found for: CHOL, CHLPL, TRIG, HDL, LDL, LDLDIRECT    No results found for: INTRAOP, PREDX, FINALDX, COMDX    Microbiology Results (last 10 days)     Procedure Component Value - Date/Time    MRSA Screen, PCR (Inpatient) - Swab, Nares [[id number]]  (Normal) Collected:  07/31/20 1025    Lab Status:  Final result Specimen:  Swab from Nares Updated:  07/31/20 1153     MRSA PCR No MRSA Detected    Blood Culture - Blood, Arm, Right [[id number]] Collected:  07/30/20 2252    Lab Status:  Preliminary result Specimen:  Blood from Arm, Right Updated:  08/03/20 2300     Blood Culture No growth at 4 days    Blood Culture - Blood, Arm, Left [[id number]] Collected:  07/30/20 2252    Lab Status:  Preliminary result Specimen:  Blood from Arm, Left Updated:  08/03/20 2300     Blood Culture No growth at 4 days    Respiratory Panel PCR w/COVID-19(SARS-CoV-2) MACHELLE/CODEY/FIORELLA In-House, NP Swab in [facility], 8-12 Hr TAT - Swab, Nasopharynx [[id number]]  (Normal) Collected:  07/30/20 2252    Lab Status:  Final result Specimen:  Swab from Nasopharynx Updated:  07/31/20 0054      ADENOVIRUS, PCR Not Detected     Coronavirus 229E Not Detected     Coronavirus HKU1 Not Detected     Coronavirus NL63 Not Detected     Coronavirus OC43 Not Detected     COVID19 Not Detected     Human Metapneumovirus Not Detected     Human Rhinovirus/Enterovirus Not Detected     Influenza A PCR Not Detected     Influenza A H1 Not Detected     Influenza A H1 2009 PCR Not Detected     Influenza A H3 Not Detected     Influenza B PCR Not Detected     Parainfluenza Virus 1 Not Detected     Parainfluenza Virus 2 Not Detected     Parainfluenza Virus 3 Not Detected     Parainfluenza Virus 4 Not Detected     RSV, PCR Not Detected     Bordetella pertussis pcr Not Detected     Bordetella parapertussis PCR Not Detected     Chlamydophila pneumoniae PCR Not Detected     Mycoplasma pneumo by PCR Not Detected    Narrative:       Fact sheet for providers: https://docs.Sosh/wp-content/uploads/JIT9165-5793-JL7.1-EUA-Provider-Fact-Sheet-3.pdf    Fact sheet for patients: https://docs.[employer]/wp-content/uploads/MYW2680-8766-IC4.1-EUA-Patient-Fact-Sheet-1.pdf          ECG/EMG Results (most recent)     Procedure Component Value Units Date/Time    ECG 12 Lead [[id number]] Collected:  07/30/20 2231     Updated:  08/01/20 1503    Narrative:       HEART RATE= 104  bpm  RR Interval= 592  ms  TX Interval= 152  ms  P Horizontal Axis=   deg  P Front Axis= 87  deg  QRSD Interval= 81  ms  QT Interval= 367  ms  QRS Axis= -81  deg  T Wave Axis= 74  deg  - ABNORMAL ECG -  Sinus tachycardia  Atrial premature complexes  Right atrial enlargement  LAD, consider left anterior fascicular block  Right ventricular hypertrophy  Repol abnrm suggests ischemia, diffuse leads  Electronically Signed By:   Date and Time of Study: 2020-07-30 22:31:32               Results for orders placed during the hospital encounter of 07/13/20   Adult Transthoracic Echo Complete W/ Cont if Necessary Per Protocol    Narrative · Left ventricular systolic function is  normal.  · Estimated EF appears to be in the range of 61 - 65%.  · Left ventricular diastolic dysfunction (grade I) consistent with   impaired relaxation.          Xr Chest 1 View    Result Date: 7/31/2020  Mild linear atelectasis within left midlung, but otherwise no acute cardiopulmonary process identified.  Electronically Signed By-DR. Chintan Stern MD On:7/31/2020 7:19 AM This report was finalized on [id number] by DR. Chintan Stern MD.    Ct Chest Pulmonary Embolism    Result Date: 7/31/2020  1. No pulmonary embolus. 2. Chronic obstructive airways disease with some patchy atelectasis of the lungs. No focal consolidation. 3. Basilar bronchial wall thickening that is likely a bronchitis although some of it could be chronic. 4. Partially visualized contrast extravasation in the soft tissues of the right shoulder. Electronically signed by:  Melvin Tay M.D.  7/31/2020 2:06 AM          Xrays, labs reviewed personally by physician.    Medication Review:   I have reviewed the patient's current medication list      Scheduled Meds    bacitracin  Topical Q8H   cefdinir 300 mg Oral Q12H   cetirizine 10 mg Oral Daily   docusate sodium 100 mg Oral BID   doxycycline 100 mg Oral Q12H   DULoxetine 30 mg Oral BID   enoxaparin 40 mg Subcutaneous Daily   guaifenesin-dextromethorphan 1 tablet Oral BID   insulin lispro 0-7 Units Subcutaneous TID AC   ipratropium-albuterol 3 mL Nebulization 4x Daily - RT   Morphine 30 mg Oral Q12H   nicotine 1 patch Transdermal Daily   nystatin  Topical BID   potassium chloride 20 mEq Oral Daily   predniSONE 40 mg Oral Daily With Breakfast   senna 2 tablet Oral Nightly   sodium chloride 10 mL Intravenous Q12H       Meds Infusions       Meds PRN  •  acetaminophen **OR** acetaminophen **OR** acetaminophen  •  albuterol  •  bismuth subsalicylate  •  calcium carbonate  •  dextrose  •  dextrose  •  glucagon (human recombinant)  •  guaiFENesin  •  insulin lispro **AND** insulin lispro  •   LORazepam  •  melatonin  •  Morphine  •  nitroglycerin  •  ondansetron **OR** ondansetron  •  potassium chloride  •  potassium chloride  •  prochlorperazine  •  sodium chloride  •  sodium chloride        Assessment/Plan   Assessment/Plan     Active Hospital Problems    Diagnosis  POA   • **COPD (chronic obstructive pulmonary disease) (CMS/[facility]) [J44.9]  Yes   • Severe sepsis (CMS/[facility]) [A41.9, R65.20]  Yes   • Essential hypertension [I10]  Yes   • Hyperlipidemia [E78.5]  Yes   • Anxiety disorder [F41.9]  Yes   • Congestive heart failure (CHF) (CMS/[facility]) [I50.9]  Yes      Resolved Hospital Problems   No resolved problems to display.       MEDICAL DECISION MAKING COMPLEXITY BY PROBLEM:     Simple sepsis secondary to acute bronchitis, Continue oral doxycyline, will change iv cefepime to oral omnicef, sputum culture if possible, can be discharge in next 24 to 48 hours if coming along well on oral antibiotic in total of seven day course.  -Antibiotics changed to oral   -Steroids changed to oral   -Will monitor for 24 hours    Acute on chronic hypoxic respiratory failure, requiring now 4 L of oxygen ... Taper off as tolerated, patient on 2 L of home oxygen at home.  Taper of oxygen as tolerated.     Acute exacerbation of COPD improving slowly, DuoNeb, Mucinex, monitor clinical progress.     Anemia, Hemoglobin 10.7: Repeat CBC     Hypokalemia, marked, potassium 2.7, uncertain etiology: Potassium replacement protocol     Hyponatremia, mild sodium 133: IV fluids normal saline; repeat BMP.     Hospice care secondary to COPD: Continue Ativan; continue MS Contin     Anxiety, chronic: Continue Ativan, Cymbalta     VTE Prophylaxis -     VTE Prophylaxis -   Mechanical Order History:     None      Pharmalogical Order History:     Ordered     Dose Route Frequency Stop    07/31/20 0457  enoxaparin (LOVENOX) syringe 40 mg      40 mg SC Daily --            Code Status -   Code Status and Medical Interventions:   Ordered at: 07/31/20 0457       Code Status:    CPR     Medical Interventions (Level of Support Prior to Arrest):    Full       This patient has been examined wearing appropriate Personal Protective Equipment and       Discharge Planning  Likely medically stable for discharge 8/4/2020      Electronically signed by Lizzie Miner MD, 08/04/20, 10:37.  [facility] Hospitalist Team

## 2020-08-05 NOTE — PROGRESS NOTES
Case Management Discharge Note      Final Note: [facility]                      Final Discharge Disposition Code: 50 - home with hospice

## 2020-08-05 NOTE — OUTREACH NOTE
Prep Survey      Responses   [facility] facility patient discharged from?  [facility]   Is LACE score < 7 ?  No   Eligibility  Not Eligible   What are the reasons patient is not eligible?  Hospice/Pallative Care [[facility]]   COVID-19 Test Status  Negative   Does the patient have one of the following disease processes/diagnoses(primary or secondary)?  Sepsis   Prep survey completed?  Yes          Odilia Joshi RN

## 2020-08-12 NOTE — DISCHARGE SUMMARY
[facility] Medicine Services  DISCHARGE SUMMARY        Prepared For PCP:  Naomi Quispe APRN    Patient Name: Daphne Mccurdy  : 1960  MRN: [id number]      Date of Admission:   2020    Date of Discharge:  2020    Length of stay:  LOS: 4 days     Hospital Course     Presenting Problem:   Hypokalemia [E87.6]  COPD exacerbation (CMS/[facility]) [J44.1]  Severe sepsis (CMS/[facility]) [A41.9, R65.20]  Severe sepsis (CMS/[facility]) [A41.9, R65.20]      Active Hospital Problems    Diagnosis  POA   • **COPD (chronic obstructive pulmonary disease) (CMS/[facility]) [J44.9]  Yes   • Severe sepsis (CMS/[facility]) [A41.9, R65.20]  Yes   • Essential hypertension [I10]  Yes   • Hyperlipidemia [E78.5]  Yes   • Anxiety disorder [F41.9]  Yes   • Congestive heart failure (CHF) (CMS/[facility]) [I50.9]  Yes      Resolved Hospital Problems   No resolved problems to display.       Hospital Course:  Daphne Mccurdy is a 59 y.o. female admitted with:     Simple sepsis secondary to acute bronchitis, Continue oral doxycyline, will change iv cefepime to oral omnicef, sputum culture if possible, can be discharge in next 24 to 48 hours if coming along well on oral antibiotic in total of seven day course.  -Antibiotics changed to oral   -Steroids changed to oral   -Will monitor for 24 hours     Acute on chronic hypoxic respiratory failure, requiring now 4 L of oxygen ... Taper off as tolerated, patient on 2 L of home oxygen at home.  Taper of oxygen as tolerated.     Acute exacerbation of COPD improving slowly, DuoNeb, Mucinex, monitor clinical progress.     Anemia, Hemoglobin 10.7: Repeat CBC     Hypokalemia, marked, potassium 2.7, uncertain etiology: Potassium replacement protocol     Hyponatremia, mild sodium 133: IV fluids normal saline; repeat BMP.     Hospice care secondary to COPD: Continue Ativan; continue MS Contin     Anxiety, chronic: Continue Ativan, Cymbalta    Patient discharged in stable condition with home hospice  services in place.    Recommendation for Outpatient Providers:             Reasons For Change In Medications and Indications for New Medications:        Day of Discharge     HPI:   No complaints     Vital Signs:   Vitals:    08/04/20 1943   BP:    Pulse: 92   Resp: 20   Temp:    SpO2: 96%     Stable    Physical Exam:  Physical Exam  Unchanged from previously documented physical exam by hospitalist provider     Pertinent  and/or Most Recent Results               Invalid input(s): PROT, LABALBU        Invalid input(s): TG, LDLCALC, LDLREALC        Brief Urine Lab Results  (Last result in the past 365 days)      Color   Clarity   Blood   Leuk Est   Nitrite   Protein   CREAT   Urine HCG        07/13/20 2324 Yellow Clear Negative Negative Negative Negative               Microbiology Results Abnormal     Procedure Component Value - Date/Time    Blood Culture - Blood, Arm, Right [[id number]] Collected:  07/30/20 2252    Lab Status:  Final result Specimen:  Blood from Arm, Right Updated:  08/04/20 2300     Blood Culture No growth at 5 days    Blood Culture - Blood, Arm, Left [[id number]] Collected:  07/30/20 2252    Lab Status:  Final result Specimen:  Blood from Arm, Left Updated:  08/04/20 2300     Blood Culture No growth at 5 days    MRSA Screen, PCR (Inpatient) - Swab, Nares [[id number]]  (Normal) Collected:  07/31/20 1025    Lab Status:  Final result Specimen:  Swab from Nares Updated:  07/31/20 1153     MRSA PCR No MRSA Detected    Respiratory Panel PCR w/COVID-19(SARS-CoV-2) MACHELLE/CODEY/FIORELLA In-House, NP Swab in UTM/VTP, 8-12 Hr TAT - Swab, Nasopharynx [[id number]]  (Normal) Collected:  07/30/20 2252    Lab Status:  Final result Specimen:  Swab from Nasopharynx Updated:  07/31/20 0054     ADENOVIRUS, PCR Not Detected     Coronavirus 229E Not Detected     Coronavirus HKU1 Not Detected     Coronavirus NL63 Not Detected     Coronavirus OC43 Not Detected     COVID19 Not Detected     Human Metapneumovirus Not Detected     Human  Rhinovirus/Enterovirus Not Detected     Influenza A PCR Not Detected     Influenza A H1 Not Detected     Influenza A H1 2009 PCR Not Detected     Influenza A H3 Not Detected     Influenza B PCR Not Detected     Parainfluenza Virus 1 Not Detected     Parainfluenza Virus 2 Not Detected     Parainfluenza Virus 3 Not Detected     Parainfluenza Virus 4 Not Detected     RSV, PCR Not Detected     Bordetella pertussis pcr Not Detected     Bordetella parapertussis PCR Not Detected     Chlamydophila pneumoniae PCR Not Detected     Mycoplasma pneumo by PCR Not Detected    Narrative:       Fact sheet for providers: https://docs.[employer]/wp-content/uploads/YUM5857-9312-KJ7.1-EUA-Provider-Fact-Sheet-3.pdf    Fact sheet for patients: https://docs.[employer]/wp-content/uploads/QMM0876-3359-RX7.1-EUA-Patient-Fact-Sheet-1.pdf          Xr Chest 1 View    Result Date: 7/31/2020  Impression: Mild linear atelectasis within left midlung, but otherwise no acute cardiopulmonary process identified.  Electronically Signed By-DR. Chintan Stern MD On:7/31/2020 7:19 AM This report was finalized on [id number] by DR. Chintan Stern MD.    Xr Chest 1 View    Result Date: 7/14/2020  Impression: Mildly limited study demonstrating mild linear subsegmental atelectasis and/or scarring in the left lung base.  Electronically Signed By-Yevgeniy eLary On:7/14/2020 7:18 AM This report was finalized on [id number] by  Yevgeniy Leary, .    Ct Chest Pulmonary Embolism    Result Date: 7/31/2020  Impression: 1. No pulmonary embolus. 2. Chronic obstructive airways disease with some patchy atelectasis of the lungs. No focal consolidation. 3. Basilar bronchial wall thickening that is likely a bronchitis although some of it could be chronic. 4. Partially visualized contrast extravasation in the soft tissues of the right shoulder. Electronically signed by:  Melvin Tay M.D.  7/31/2020 2:06 AM                Results for orders placed during the  hospital encounter of 07/13/20   Adult Transthoracic Echo Complete W/ Cont if Necessary Per Protocol    Narrative · Left ventricular systolic function is normal.  · Estimated EF appears to be in the range of 61 - 65%.  · Left ventricular diastolic dysfunction (grade I) consistent with   impaired relaxation.                  Test Results Pending at Discharge        Procedures Performed           Consults:   Consults     Date and Time Order Name Status Description    7/31/2020 0432 Inpatient Hospitalist Consult Completed     7/14/2020 1024 Inpatient Cardiology Consult Completed     7/14/2020 0023 Hospitalist (on-call MD unless specified) Completed             Discharge Details        Discharge Medications      Changes to Medications      Instructions Start Date   amLODIPine 2.5 MG tablet  Commonly known as:  NORVASC  What changed:    · how much to take  · when to take this   2.5 mg, Oral, Daily      bumetanide 0.5 MG tablet  Commonly known as:  BUMEX  What changed:  how much to take   0.5 mg, Oral, Daily         Continue These Medications      Instructions Start Date   albuterol (2.5 MG/3ML) 0.083% nebulizer solution  Commonly known as:  PROVENTIL   2.5 mg, Nebulization, Every 6 Hours PRN      albuterol sulfate  (90 Base) MCG/ACT inhaler  Commonly known as:  PROVENTIL HFA;VENTOLIN HFA;PROAIR HFA   2 puffs, Inhalation, Every 4 Hours PRN      bacitracin 500 UNIT/GM ointment   Topical, 4 Times Daily, Burn on face       bismuth subsalicylate 262 MG/15ML suspension  Commonly known as:  PEPTO BISMOL   30 mL, Oral, Every 3 Hours PRN      calcium carbonate 500 MG chewable tablet  Commonly known as:  TUMS   2 tablets, Oral, Every 4 Hours PRN      [employer] [place] Honey Wound gel   Apply externally, Wound with foul odor  Change every 3-5 days or PRN       docusate sodium 100 MG capsule  Commonly known as:  COLACE   100 mg, Oral, 2 Times Daily      DULoxetine 30 MG capsule  Commonly known as:  CYMBALTA   30 mg, Oral, 2 Times  Daily      guaiFENesin 100 MG/5ML solution oral solution  Commonly known as:  ROBITUSSIN   200 mg, Oral, Every 4 Hours PRN      ipratropium-albuterol 0.5-2.5 mg/3 ml nebulizer  Commonly known as:  DUO-NEB   3 mL, Nebulization, 4 Times Daily - RT      loratadine 10 MG tablet  Commonly known as:  CLARITIN   10 mg, Oral, Daily      LORazepam 1 MG tablet  Commonly known as:  ATIVAN   1 mg, Oral, Every 6 Hours PRN      melatonin 1 MG tablet   6 mg, Oral, Nightly      Morphine 15 MG tablet  Commonly known as:  MSIR   15 mg, Oral, Every 3 Hours PRN      Morphine 30 MG 12 hr tablet  Commonly known as:  MS CONTIN   30 mg, Oral, 2 Times Daily      nicotine 21 MG/24HR patch  Commonly known as:  NICODERM CQ   1 patch, Transdermal, Every 24 Hours      nitroglycerin 0.4 MG SL tablet  Commonly known as:  NITROSTAT   0.4 mg, Sublingual, Every 5 Minutes PRN, Take no more than 3 doses in 15 minutes.       nystatin 158451 UNIT/GM powder  Commonly known as:  MYCOSTATIN   Topical, 2 Times Daily      POTASSIUM CHLORIDE PO   20 mEq, Oral, Daily      predniSONE 10 MG tablet  Commonly known as:  DELTASONE   20 mg, Oral, 2 Times Daily      prochlorperazine 10 MG tablet  Commonly known as:  COMPAZINE   10 mg, Oral, Every 6 Hours PRN      senna 8.6 MG tablet  Commonly known as:  SENOKOT   2 tablets, Oral, Nightly         Stop These Medications    doxycycline 100 MG tablet  Commonly known as:  VIBRAMYICN        ASK your doctor about these medications      Instructions Start Date   cefdinir 300 MG capsule  Commonly known as:  OMNICEF  Ask about: Should I take this medication?   300 mg, Oral, Every 12 Hours Scheduled      doxycycline 100 MG tablet  Commonly known as:  ADOXA  Ask about: Should I take this medication?   100 mg, Oral, Every 12 Hours Scheduled      predniSONE 20 MG tablet  Commonly known as:  DELTASONE  Ask about: Should I take this medication?   40 mg, Oral, Daily With Breakfast             No Known Allergies      Discharge  Disposition:  Home-Health Care Svc    Diet:  Hospital:No active diet order        Discharge Activity:     Activity as tolerated     CODE STATUS:    Code Status and Medical Interventions:   Ordered at: 07/31/20 0457     Code Status:    CPR     Medical Interventions (Level of Support Prior to Arrest):    Full         Follow-up Appointments  No future appointments.          Condition on Discharge:      Stable          Electronically signed by Lizzie Miner MD, 08/12/20, 12:50 AM.    Time: I spent  25  minutes on this discharge activity which included face-to-face encounter with the patient/reviewing the data in the system/coordination of the care with the nursing staff as well as consultants/documentation/entering orders.

## 2020-08-23 PROBLEM — R06.03 ACUTE RESPIRATORY DISTRESS: Status: ACTIVE | Noted: 2020-01-01

## 2020-08-23 PROBLEM — J96.21 ACUTE ON CHRONIC RESPIRATORY FAILURE WITH HYPOXIA AND HYPERCAPNIA (HCC): Status: ACTIVE | Noted: 2020-01-01

## 2020-08-23 PROBLEM — J96.22 ACUTE ON CHRONIC RESPIRATORY FAILURE WITH HYPOXIA AND HYPERCAPNIA (HCC): Status: ACTIVE | Noted: 2020-01-01

## 2020-08-24 PROBLEM — L89.150 UNSTAGEABLE PRESSURE ULCER OF SACRAL REGION (HCC): Status: ACTIVE | Noted: 2020-01-01

## 2020-08-28 PROBLEM — Z51.5 HOSPICE CARE: Status: ACTIVE | Noted: 2020-01-01

## 2020-08-29 PROBLEM — L89.156 PRESSURE INJURY OF DEEP TISSUE OF SACRAL REGION: Status: ACTIVE | Noted: 2020-01-01

## 2020-08-29 PROBLEM — I51.9 LEFT VENTRICULAR DIASTOLIC DYSFUNCTION: Status: ACTIVE | Noted: 2020-01-01

## 2020-09-01 NOTE — PLAN OF CARE
Problem: Patient Care Overview  Goal: Plan of Care Review  Outcome: Ongoing (interventions implemented as appropriate)  Flowsheets (Taken 9/1/2020 0422)  Outcome Summary: Pt required premedicating prior to Q4 hr turns with 2mg Dilaudid, 2mg Ativan and 0.4mg Robinul. O2@3L via NC. Requires recovery position due to congestion. Family at bedside with visitors switching out.     Problem: Patient Care Overview  Goal: Individualization and Mutuality  Outcome: Ongoing (interventions implemented as appropriate)  Flowsheets (Taken 9/1/2020 0422)  Patient Specific Goals (Include Timeframe): comfort  Patient Specific Interventions: F/C and oral care provided.

## 2020-09-01 NOTE — PLAN OF CARE
Problem: Patient Care Overview  Goal: Plan of Care Review  Outcome: Ongoing (interventions implemented as appropriate)  Flowsheets (Taken 9/1/2020 1607)  Outcome Summary: Premed every 4 hours with Dilaudid 2mg, Ativan 2mg, and Robinul 0.4mg.  Increased congestion with plan to increase Robinul to 0.8mg at next turn.  O2 continues at 3LNC with SPO2% in mid 70s.  Family at bedside.

## 2020-09-01 NOTE — PROGRESS NOTES
Palliative Care/Hospice Follow Up Note       LOS: 4 days   Patient Care Team:  Naomi Quispe APRN as PCP - General (Family Medicine)  Brandt Bradford MD as Consulting Physician (Hospice and Palliative Medicine)    Chief Complaint:  End-stage chronic obstructive pulmonary disease    Interval History:     Patient Complaints: None  Patient Denies:  None  History taken from: Significant other and RN; hospice RN note from today reviewed    Review of Systems:  As above.    Palliative Performance Scale  Palliative Performance Scale Score: 10%  [place] Symptom Assessment System Revised  Pain Score: no pain   ESAS Tiredness Score: Worst possible tiredness  ESAS Nausea Score: No nausea  ESAS Depression Score: unable to assess  ESAS Anxiety Score: No anxiety  ESAS Drowsiness Score: Worst possible drowsiness  ESAS Lack of Appetite Score: Worst lack of appetite  ESAS Wellbeing Score: unable to assess  ESAS Dyspnea Score: No shortness of breath  ESAS Other Problem Score: Best possible response  ESAS Source of Information: healthcare professional caregiver  ESAS Intervention: medicated/see MAR  ESAS Intervention Response: tolerated    Vital Signs  Temp:  [97.4 °F (36.3 °C)-100.2 °F (37.9 °C)] 100.2 °F (37.9 °C)  Heart Rate:  [116-123] 116  Resp:  [14-16] 16  BP: ()/(67-75) 99/67  Device (Oxygen Therapy): nasal cannulaFlow (L/min):  [2-3] 2SpO2:  [76 %-86 %] 86 %    Physical Exam:  General Appearance:    Not awake and in no acute distress lying on her left side, cushingoid appearing   Throat:   No oral lesions, oral mucosa somewhat moist   Neck:   No adenopathy, supple, trachea midline   Lungs:     Clear to auscultation, minimal scattered rhonchi, respirations diminished and shallow    Heart:    Regular rhythm and tachycardia    Abdomen:     Occasional bowel sounds, soft and non-tender, non-distended   Extremities:   No edema, ecchymosis noted, mottling LEs and she appears to have cyanosis   Pulses:   Radial  pulses palpable and equal bilaterally          Results Review:     I reviewed the patient's new clinical results.    Medication Reviewed.    Assessment/Plan       End stage COPD (CMS/[facility])    Hospice care    Acute on chronic respiratory failure with hypoxia and hypercapnia (CMS/[facility])    Left ventricular diastolic dysfunction    Pressure injury of deep tissue of sacral region    Essential hypertension    I reviewed with the patient's significant other at bedside.  I reviewed with the patient's RN. The patient appears comfortable.  Respiratory status very diminished.  The patient has received glycopyrrolate for airway congestion. The patient has required 3 doses 2 mg Dilaudid, 6 doses yesterday, and 3 doses 2 mg Ativan, 6 doses yesterday, thus far today. Meds to be continued for symptom management for comfort.  Continued decline noted.     Plan for disposition:  ASIA Bradford MD  Hospice and Palliative Medicine  09/01/20  19:21

## 2020-09-01 NOTE — PROGRESS NOTES
[facility] Visit Report    Daphne Mccurdy  [id number]  9/1/2020    [facility]    Reason for [facility] Admission: COPD    Symptom  Management: pain, dyspnea, congestion and restlessness.     Nursing/Medication Recommendations: Continue to provide comfort measures as ordered. Contact [facility] with any questions or concerns and at TOD at [phone].         Psychosocial Issues and Recommendations:    Spiritual Concerns and Recommendations:    [facility] Plans:  None at this time    Review of Visit (Include All Collaboration- including names of hospital and family involved during admission/visit): Daily RN visit. Reviewed Pt notes, VS and MAR in Epic. PPS 10%. Bedbound with oral care only. No family present. Pt lying in bed in the recovery position with thin, yellowish secretions draining from mouth. Pt is completely unresponsive to voice or touch, other then congestion, appears to be resting comfortably. Pt remains flushed with a yellowish undertone with significant generalized bruising noted to arms, legs, trunk and back. Respirations remain even, shallow and unlabored with scattered crackles noted to upper lobes and diminished throughout - RR 16/76% on 3L NC. Joseph catheter in place with small dark urine. Updated Pt's significant other Faisal by phone, very appreciative with no questions or concerns voiced at this time. Pt continues to steadily decline, appears to be in the active dying stage and is requiring IV prn medications to achieve/maintain Pt comfort. Medications given in the last 24hrs: Dilaudid 2mg x 6 (1.5mg x 1), Ativan 2mg x 7 and Robinul 0.4mg x 7 with Scopolamine patch in place.  Will continue to monitor with daily RN visits.        Dulce Westbrook RN   Scatter Bed Team

## 2020-09-02 NOTE — CONSULTS
"Pt sleeping; significan other, Faisal at bedside.  Dialog re pt's life and family.  Faisal states:  \"I think its good for me to be here so she doesn't feel alone.'  Per Faisal's tearful request, prayer offered for p''st forgiveness and reunion with stillborn baby.  Tesrs shed freely, gratitude expressed.  Aware of Chaplainy's continued availability.    "

## 2020-09-02 NOTE — PLAN OF CARE
Problem: Patient Care Overview  Goal: Plan of Care Review  Outcome: Ongoing (interventions implemented as appropriate)  Flowsheets (Taken 9/2/2020 5052)  Plan of Care Reviewed With: significant other  Outcome Summary: PRN meds admin prior to care/repositioning to maintain comfort. Pt asymptomatic and appears to have rested comfortably through shift. S/O at bsd through shift. Continue to monitor and tx per POC and MD orders.

## 2020-09-02 NOTE — PROGRESS NOTES
[facility] Visit Report    Daphne Mccurdy  [id number]  9/2/2020    Admission R/T [facility] Dx: Yes     Reason for [facility] Admission: COPD    Symptom  Management: dyspnea, pain, restlessness and congestion    Nursing/Medication Recommendations: Continue to provide comfort measures as ordered. Contact [facility] with any questions or concerns and at TOD at [phone].       Psychosocial Issues and Recommendations:    Spiritual Concerns and Recommendations:    [facility] Discharge Plans:  None at this time    Review of Visit (Include All Collaboration- including names of hospital and family involved during admission/visit): Daily RN visit. Reviewed Pt notes, VS and MAR in Epic. Spoke to facility RN who reports Pt needed a new IV line this morning, unresponsive with guppy breathing and is being medicated prior to turns with IV prn Dilaudid 2mg x 7, Ativan 2mg x 7 and Robinul 0.8mg x 1 with a Scopolamine patch in place. PPs remains at 10% with oral care only. Pt's significant other Faisal was at bedside but has recently stepped off the unit. Mrs Serna is lying in bed on her left side, pale/jaundice/ashen with significant bruising to generalized body and redness under her chin. Scattered crackles noted to upper lobes and diminished throughout with short open mouth breathing - RR 16/89% on 2L. Joseph catheter remains in place with small dark urine. [facility] continues to adjust prn IV medication dosages to maintain Pt comfort, remains GIP appropriate and continues to require IV prn medications for breakthrough symptoms of pain, restlessness and congestion. Pt appears to in the the active dying stage with no [facility] discharge or transfer recommended at this time. Faisal is now at bedside, reviewed overall assessment and addressed all questions. Will continue to monitor with daily RN visits.     Dulce Westbrook RN   Scatter Bed Team

## 2020-09-02 NOTE — PLAN OF CARE
Problem: Patient Care Overview  Goal: Plan of Care Review  Outcome: Ongoing (interventions implemented as appropriate)  Flowsheets  Taken 9/2/2020 1721  Progress: declining  Outcome Summary: Actively dying. Premedicating prior to turns with 2mg ativan and 2mg dilaudid. New midline placed. Family visiting at bedside, will cont to monitor  Taken 9/2/2020 0812  Plan of Care Reviewed With: significant other

## 2020-09-02 NOTE — PROGRESS NOTES
Palliative Care/Hospice Follow Up Note       LOS: 5 days   Patient Care Team:  Naomi Quispe APRN as PCP - General (Family Medicine)  Brandt Bradford MD as Consulting Physician (Hospice and Palliative Medicine)    Chief Complaint:  End-stage chronic obstructive pulmonary disease    Interval History:     Patient Complaints: None  Patient Denies:  None  History taken from: Significant other and RN; hospice RN note from today reviewed    Review of Systems:  As above.    Palliative Performance Scale  Palliative Performance Scale Score: 10%  [place] Symptom Assessment System Revised  Pain Score: no pain   ESAS Tiredness Score: Worst possible tiredness  ESAS Nausea Score: No nausea  ESAS Depression Score: unable to assess  ESAS Anxiety Score: No anxiety  ESAS Drowsiness Score: Worst possible drowsiness  ESAS Lack of Appetite Score: Worst lack of appetite  ESAS Wellbeing Score: Best wellbeing  ESAS Dyspnea Score: No shortness of breath  ESAS Other Problem Score: unable to assess  ESAS Source of Information: healthcare professional caregiver  ESAS Intervention: medicated/see MAR  ESAS Intervention Response: tolerated    Vital Signs  Temp:  [99.4 °F (37.4 °C)-100.2 °F (37.9 °C)] 99.4 °F (37.4 °C)  Heart Rate:  [116-119] 119  Resp:  [14-16] 14  BP: (99)/(67) 99/67  Device (Oxygen Therapy): nasal cannulaFlow (L/min):  [2] 2SpO2:  [86 %-89 %] 89 %    Physical Exam:  General Appearance:    Not awake and in no acute distress lying on her right side, cushingoid appearing   Throat:   No oral lesions, oral mucosa somewhat moist   Neck:   No adenopathy, supple, trachea midline   Lungs:     Clear to auscultation, minimal scattered rhonchi, respirations diminished and agonal    Heart:    Regular rhythm and tachycardia    Abdomen:     Occasional bowel sounds, soft and non-tender, non-distended   Extremities:   No edema, ecchymosis noted, mottling LEs and she appears to have cyanosis   Pulses:   Radial pulses palpable and  equal bilaterally          Results Review:     I reviewed the patient's new clinical results.    Medication Reviewed.    Assessment/Plan       End stage COPD (CMS/[facility])    Hospice care    Acute on chronic respiratory failure with hypoxia and hypercapnia (CMS/[facility])    Left ventricular diastolic dysfunction    Pressure injury of deep tissue of sacral region    Essential hypertension    I reviewed with the patient's significant other at bedside.  I reviewed with the patient's RN. The patient appears comfortable.  Respiratory status has declined since yesterday.  The patient has received glycopyrrolate for airway congestion. The patient has required 4 doses 2 mg Dilaudid, 6 doses yesterday, and 4 doses 2 mg Ativan, 6 doses yesterday, thus far today. Meds to be continued for symptom management for comfort.  Continued decline in her condition noted.     Plan for disposition:  HELENA Bradford MD  Hospice and Palliative Medicine  09/02/20  16:45
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
## 2020-09-03 NOTE — DISCHARGE SUMMARY
Discharge As      Date of Admisssion:  2020  Date of Death:  9/3/2020  Time of Death:  5:04 AM    Patient Care Team:  Naomi Quispe APRN as PCP - General (Family Medicine)  Brandt Bradford MD as Consulting Physician (Hospice and Palliative Medicine)    Final Diagnosis:     End stage COPD (CMS/HCC)    Hospice care    Acute on chronic respiratory failure with hypoxia and hypercapnia (CMS/HCC)    Left ventricular diastolic dysfunction    Pressure injury of deep tissue of sacral region    Essential hypertension    Death in hospice      Hospital Course  Patient was a 59 y.o. female who has end-stage chronic obstructive pulmonary disease with acute on chronic hypercarbic and hypoxemic respiratory failure.  The patient has been on hospice program and has advanced to the point that she does not want additional treatment.  Her POA (daughter Nichole) agrees and they have asked for and placed patient inpatient hospice as the patient cannot be cared for by the family any longer.  The patient was transferred from [facility] to palliative care unit at [facility] as a hospice scatter bed patient. Symptom management provided for comfort. I talked with the patient's significant other almost daily. Gradual decline noted. Prior to seeing the patient today, I was called that the patient's respirations ceased and no pulse palpable. No heart sounds audible. I pronounced the patient at 0504 hours.      Brandt Bradford MD  Hospice and Palliative Medicine  20  07:03

## 2020-09-07 NOTE — PROGRESS NOTES
Case Management Discharge Note      Final Note: Patient  9/3/2020         Destination - Selection Complete      Service Provider Request Status Selected Services Address Phone Number Fax Number    [facility] Selected Inpatient [facility]ice [address], [place] [id number] [phone]                Final Discharge Disposition Code: 41 -  in medical facility